# Patient Record
Sex: MALE | Race: BLACK OR AFRICAN AMERICAN | Employment: UNEMPLOYED | URBAN - METROPOLITAN AREA
[De-identification: names, ages, dates, MRNs, and addresses within clinical notes are randomized per-mention and may not be internally consistent; named-entity substitution may affect disease eponyms.]

---

## 2017-02-06 ENCOUNTER — OFFICE VISIT (OUTPATIENT)
Dept: FAMILY MEDICINE CLINIC | Age: 32
End: 2017-02-06

## 2017-02-06 VITALS
BODY MASS INDEX: 30.78 KG/M2 | RESPIRATION RATE: 16 BRPM | SYSTOLIC BLOOD PRESSURE: 150 MMHG | HEART RATE: 60 BPM | DIASTOLIC BLOOD PRESSURE: 100 MMHG | HEIGHT: 70 IN | OXYGEN SATURATION: 99 % | WEIGHT: 215 LBS | TEMPERATURE: 97.9 F

## 2017-02-06 DIAGNOSIS — Z86.69 HISTORY OF SEIZURE DISORDER: ICD-10-CM

## 2017-02-06 DIAGNOSIS — I10 ESSENTIAL HYPERTENSION: Primary | ICD-10-CM

## 2017-02-06 RX ORDER — LOSARTAN POTASSIUM 50 MG/1
TABLET ORAL
Qty: 30 TAB | Refills: 11 | Status: SHIPPED | OUTPATIENT
Start: 2017-02-06 | End: 2017-06-19 | Stop reason: ALTCHOICE

## 2017-02-06 NOTE — MR AVS SNAPSHOT
Visit Information Date & Time Provider Department Dept. Phone Encounter #  
 2/6/2017  8:20 AM Levi Chawla  [facility] [address] [phone] [id number] Follow-up Instructions Return in about 3 months (around 5/6/2017) for recheck on bp if doing well, goal is less than 140/90. Upcoming Health Maintenance Date Due DTaP/Tdap/Td series (2 - Td) 1/10/2015 Allergies as of 2/6/2017  Review Complete On: 2/6/2017 By: Levi Chawla NP Severity Noted Reaction Type Reactions Amlodipine  06/07/2016    Swelling Current Immunizations  Never Reviewed Name Date Influenza Vaccine 9/16/2014 Tdap 1/10/2005 Not reviewed this visit You Were Diagnosed With   
  
 Codes Comments Essential hypertension    -  Primary ICD-10-CM: I10 
ICD-9-CM: 401.9 History of seizure disorder     ICD-10-CM: Z86.69 
ICD-9-CM: V12.49 Vitals BP Pulse Temp Resp Height(growth percentile) Weight(growth percentile) (!) 150/100 60 97.9 °F (36.6 °C) (Oral) 16 5' 9.5\" (1.765 m) 215 lb (97.5 kg) SpO2 BMI Smoking Status 99% 31.29 kg/m2 Never Smoker Vitals History BMI and BSA Data Body Mass Index Body Surface Area  
 31.29 kg/m 2 2.19 m 2 Preferred Pharmacy Pharmacy Name Phone [id number] [phone] Your Updated Medication List  
  
   
This list is accurate as of: 2/6/17  8:43 AM.  Always use your most recent med list.  
  
  
  
  
 levETIRAcetam 1,000 mg tablet Take 1 Tab by mouth two (2) times a day. losartan 50 mg tablet Commonly known as:  COZAAR  
TAKE 1 TAB BY MOUTH DAILY. Prescriptions Sent to Pharmacy Refills  
 losartan (COZAAR) 50 mg tablet 11 Sig: TAKE 1 TAB BY MOUTH DAILY. Class: Normal  
 Pharmacy: [address] Ph #: [phone] We Performed the Following HEMOGLOBIN A1C WITH EAG [85095 CPT(R)] LIPID PANEL [83762 CPT(R)] METABOLIC PANEL, COMPREHENSIVE [29843 CPT(R)] Follow-up Instructions Return in about 3 months (around 5/6/2017) for recheck on bp if doing well, goal is less than 140/90. Introducing [facility]! Dear Ismael Lynch: Thank you for requesting a etouches account. Our records indicate that you have previously registered for a etouches account but its currently inactive. Please call our etouches support line at [phone]. Additional Information If you have questions, please visit the Frequently Asked Questions section of the etouches website at [email or web address] Remember, etouches is NOT to be used for urgent needs. For medical emergencies, dial 911. Now available from your iPhone and Android! Please provide this summary of care documentation to your next provider. Your primary care clinician is listed as Fouzia Worrell. If you have any questions after today's visit, please call [phone].

## 2017-02-06 NOTE — LETTER
2/7/2017 8:24 AM 
 
Mr. Angeline Park [address] [id number] Dear Angeline Robison: 
 
Please find your most recent results below. Resulted Orders METABOLIC PANEL, COMPREHENSIVE Result Value Ref Range Glucose 86 65 - 99 mg/dL BUN 10 6 - 20 mg/dL Creatinine 0.90 0.76 - 1.27 mg/dL GFR est non- >59 mL/min/1.73 GFR est  >59 mL/min/1.73  
 BUN/Creatinine ratio 11 8 - 19 Sodium 139 134 - 144 mmol/L Potassium 5.1 3.5 - 5.2 mmol/L Chloride 100 96 - 106 mmol/L  
 CO2 22 18 - 29 mmol/L Calcium 9.2 8.7 - 10.2 mg/dL Protein, total 7.7 6.0 - 8.5 g/dL Albumin 4.5 3.5 - 5.5 g/dL GLOBULIN, TOTAL 3.2 1.5 - 4.5 g/dL A-G Ratio 1.4 1.1 - 2.5 Bilirubin, total <0.2 0.0 - 1.2 mg/dL Alk. phosphatase 97 39 - 117 IU/L  
 AST (SGOT) 20 0 - 40 IU/L  
 ALT (SGPT) 14 0 - 44 IU/L Narrative Performed at:  [address]  [id number] : Mukul Monge MD, Phone:  [phone] LIPID PANEL Result Value Ref Range Cholesterol, total 141 100 - 199 mg/dL Triglyceride 170 (H) 0 - 149 mg/dL HDL Cholesterol 40 >39 mg/dL VLDL, calculated 34 5 - 40 mg/dL LDL, calculated 67 0 - 99 mg/dL Narrative Performed at:  [address]  [id number] : Mukul Monge MD, Phone:  [phone] HEMOGLOBIN A1C WITH EAG Result Value Ref Range Hemoglobin A1c 5.6 4.8 - 5.6 % Comment:  
            Pre-diabetes: 5.7 - 6.4 Diabetes: >6.4 Glycemic control for adults with diabetes: <7.0 Estimated average glucose 114 mg/dL Narrative Performed at:  [address]  [id number] : Mukul Monge MD, Phone:  [phone] CVD REPORT Result Value Ref Range INTERPRETATION Note Comment:  
   Supplement report is available. Narrative Performed at:  [address] [place] [address]  [id number] : Jaimie Greco PhD, Phone:  [phone] RECOMMENDATIONS: 
 
GREAT NEWS!! All of your recent lab tests are normal or at goal. I would continue everything the same and schedule your next office visit in 6 months to continue to monitor the BP and your medication that you take for the BP. Please call me if you have any questions: [phone] Sincerely, Fouzia Worrell NP

## 2017-02-06 NOTE — PROGRESS NOTES
HISTORY OF PRESENT ILLNESS  Hawa Duarte is a 33 y.o. male. HPI  Chief Complaint   Patient presents with    Blood Pressure Check     Follow up. Hawa Duarte is a 33 y.o. male with a PHMx of htn and seizure disorder. He is here for bp recheck. His  bp medication is cozaar 50mg daily which he reports he is taking but he has been out which is why he is up today. Has been out for 3 weeks. His weight is up a few lbs from his last ov. [facility] he continues to work on eating healthy and exercising. [facility] he plans on buying a bp cuff for ath ome monitoring. [facility] he last had a seizure on 9/15th and has not yet seen his neuro since but plans to make an appt soon. [facility] he has the kepra 1000mg 2 x day. He has plenty of medication left at home to hold him over until he sees neuro    Current Outpatient Prescriptions   Medication Sig Dispense Refill    losartan (COZAAR) 50 mg tablet TAKE 1 TAB BY MOUTH DAILY. 30 Tab 11    levETIRAcetam 1,000 mg tablet Take 1 Tab by mouth two (2) times a day. 60 Tab 5     Allergies   Allergen Reactions    Amlodipine Swelling     Past Medical History   Diagnosis Date    Essential hypertension 4/1/2016    Hypertension     Pneumonia Tania 10, 2014    Seizures [facility])      History reviewed. No pertinent past surgical history. Family History   Problem Relation Age of Onset    Hypertension Mother     Heart Disease Father     Hypertension Father     Seizures Daughter      Social History   Substance Use Topics    Smoking status: Never Smoker    Smokeless tobacco: Never Used    Alcohol use No       Review of Systems   Constitutional: Negative for chills, diaphoresis, fever, malaise/fatigue and weight loss. HENT: Negative for congestion, ear discharge, ear pain, hearing loss, nosebleeds, sore throat and tinnitus. Eyes: Negative for blurred vision, photophobia, pain, discharge and redness.    Respiratory: Negative for cough, hemoptysis, sputum production, shortness of breath, wheezing and stridor. Cardiovascular: Negative for chest pain, palpitations, orthopnea and leg swelling. Gastrointestinal: Negative for abdominal pain, diarrhea, nausea and vomiting. Neurological: Positive for seizures. Negative for weakness and headaches. All other systems reviewed and are negative. Physical Exam   Constitutional: He appears well-developed and well-nourished. No distress. HENT:   Head: Normocephalic. Eyes: EOM are normal. Pupils are equal, round, and reactive to light. Neck: Normal range of motion. Neck supple. Cardiovascular: Normal rate, regular rhythm, normal heart sounds and intact distal pulses. Pulmonary/Chest: Effort normal and breath sounds normal. No respiratory distress. He has no wheezes. He has no rales. He exhibits no tenderness. Skin: Skin is warm and dry. Psychiatric: He has a normal mood and affect. His behavior is normal. Judgment and thought content normal.   At baseline   Nursing note and vitals reviewed. Results for orders placed or performed in visit on 04/01/16   CREATINE KINASE (CK), MB/TOTAL   Result Value Ref Range    Creatine Kinase,Total 245 (H) 24 - 204 U/L    Creatine Kinase (CK), MB <1.0 0.0 - 33.2 ng/mL   METABOLIC PANEL, COMPREHENSIVE   Result Value Ref Range    Glucose 77 65 - 99 mg/dL    BUN 12 6 - 20 mg/dL    Creatinine 1.01 0.76 - 1.27 mg/dL    GFR est non-AA 98 >59 mL/min/1.73    GFR est  >59 mL/min/1.73    BUN/Creatinine ratio 12 8 - 19    Sodium 143 134 - 144 mmol/L    Potassium 4.6 3.5 - 5.2 mmol/L    Chloride 102 97 - 108 mmol/L    CO2 23 18 - 29 mmol/L    Calcium 9.6 8.7 - 10.2 mg/dL    Protein, total 8.0 6.0 - 8.5 g/dL    Albumin 4.8 3.5 - 5.5 g/dL    GLOBULIN, TOTAL 3.2 1.5 - 4.5 g/dL    A-G Ratio 1.5 1.1 - 2.5    Bilirubin, total 0.3 0.0 - 1.2 mg/dL    Alk.  phosphatase 97 39 - 117 IU/L    AST (SGOT) 18 0 - 40 IU/L    ALT (SGPT) 18 0 - 44 IU/L   HEMOGLOBIN A1C   Result Value Ref Range    Hemoglobin A1c 5.5 4.8 - 5.6 % ASSESSMENT and PLAN    ICD-10-CM ICD-9-CM    1. Essential hypertension I05 340.7 METABOLIC PANEL, COMPREHENSIVE      LIPID PANEL      HEMOGLOBIN A1C WITH EAG      losartan (COZAAR) 50 mg tablet   2. History of seizure disorder Z80.75 V12.49      Homar was seen today for blood pressure check. Diagnoses and all orders for this visit:    Essential hypertension  -     METABOLIC PANEL, COMPREHENSIVE  -     LIPID PANEL  -     HEMOGLOBIN A1C WITH EAG  -     Restart losartan (COZAAR) 50 mg tablet; TAKE 1 TAB BY MOUTH DAILY. Reviewed with patient and educated regarding proper use of medication, side effects, potential adverse effects and when to follow up. Cont with exercise and diet control. rx was given for at home bp monitoring cuff  Recheck in 3 months if less th an 140/90 at home. Patient verbalizes understanding of plan of care. History of seizure disorder  Reminded him to make neuro fu appt which he states he will do promptly. Follow-up Disposition:  Return in about 3 months (around 5/6/2017) for recheck on bp if doing well, goal is less than 140/90.

## 2017-02-06 NOTE — PROGRESS NOTES
Pt presents to office today for a follow up on BP. Pt states that he has been out of losartan 50 mg for about 3 weeks now. Chief Complaint   Patient presents with    Blood Pressure Check     Follow up. 1. Have you been to the ER, urgent care clinic since your last visit? Hospitalized since your last visit? No    2. Have you seen or consulted any other health care providers outside of the [address] [place] [address] since your last visit? Include any pap smears or colon screening.  No

## 2017-02-07 LAB
ALBUMIN SERPL-MCNC: 4.5 G/DL (ref 3.5–5.5)
ALBUMIN/GLOB SERPL: 1.4 {RATIO} (ref 1.1–2.5)
ALP SERPL-CCNC: 97 IU/L (ref 39–117)
ALT SERPL-CCNC: 14 IU/L (ref 0–44)
AST SERPL-CCNC: 20 IU/L (ref 0–40)
BILIRUB SERPL-MCNC: <0.2 MG/DL (ref 0–1.2)
BUN SERPL-MCNC: 10 MG/DL (ref 6–20)
BUN/CREAT SERPL: 11 (ref 8–19)
CALCIUM SERPL-MCNC: 9.2 MG/DL (ref 8.7–10.2)
CHLORIDE SERPL-SCNC: 100 MMOL/L (ref 96–106)
CHOLEST SERPL-MCNC: 141 MG/DL (ref 100–199)
CO2 SERPL-SCNC: 22 MMOL/L (ref 18–29)
CREAT SERPL-MCNC: 0.9 MG/DL (ref 0.76–1.27)
EST. AVERAGE GLUCOSE BLD GHB EST-MCNC: 114 MG/DL
GLOBULIN SER CALC-MCNC: 3.2 G/DL (ref 1.5–4.5)
GLUCOSE SERPL-MCNC: 86 MG/DL (ref 65–99)
HBA1C MFR BLD: 5.6 % (ref 4.8–5.6)
HDLC SERPL-MCNC: 40 MG/DL
INTERPRETATION, 910389: NORMAL
LDLC SERPL CALC-MCNC: 67 MG/DL (ref 0–99)
POTASSIUM SERPL-SCNC: 5.1 MMOL/L (ref 3.5–5.2)
PROT SERPL-MCNC: 7.7 G/DL (ref 6–8.5)
SODIUM SERPL-SCNC: 139 MMOL/L (ref 134–144)
TRIGL SERPL-MCNC: 170 MG/DL (ref 0–149)
VLDLC SERPL CALC-MCNC: 34 MG/DL (ref 5–40)

## 2017-02-07 NOTE — PROGRESS NOTES
GREAT NEWS!! All of your recent lab tests are normal or at goal. I would continue everything the same and schedule your next office visit in 6 months to continue to monitor the BP and your medication that you take for the BP.

## 2017-03-26 ENCOUNTER — HOSPITAL ENCOUNTER (EMERGENCY)
Age: 32
Discharge: HOME | End: 2017-03-26
Attending: EMERGENCY MEDICINE
Payer: MEDICAID

## 2017-03-26 VITALS
TEMPERATURE: 97.8 F | BODY MASS INDEX: 30.36 KG/M2 | HEART RATE: 73 BPM | DIASTOLIC BLOOD PRESSURE: 77 MMHG | HEIGHT: 69 IN | SYSTOLIC BLOOD PRESSURE: 132 MMHG | RESPIRATION RATE: 18 BRPM | OXYGEN SATURATION: 100 % | WEIGHT: 205 LBS

## 2017-03-26 DIAGNOSIS — B34.9 VIRAL ILLNESS: Primary | ICD-10-CM

## 2017-03-26 DIAGNOSIS — K52.9 GASTROENTERITIS, ACUTE: ICD-10-CM

## 2017-03-26 LAB
ALBUMIN SERPL BCP-MCNC: 4 G/DL (ref 3.4–5)
ALBUMIN/GLOB SERPL: 0.9 {RATIO} (ref 0.8–1.7)
ALP SERPL-CCNC: 85 U/L (ref 45–117)
ALT SERPL-CCNC: 33 U/L (ref 16–61)
ANION GAP BLD CALC-SCNC: 12 MMOL/L (ref 3–18)
AST SERPL W P-5'-P-CCNC: 34 U/L (ref 15–37)
BASOPHILS # BLD AUTO: 0 K/UL (ref 0–0.06)
BASOPHILS # BLD: 0 % (ref 0–2)
BILIRUB SERPL-MCNC: 0.8 MG/DL (ref 0.2–1)
BUN SERPL-MCNC: 11 MG/DL (ref 7–18)
BUN/CREAT SERPL: 10 (ref 12–20)
CALCIUM SERPL-MCNC: 8.9 MG/DL (ref 8.5–10.1)
CHLORIDE SERPL-SCNC: 102 MMOL/L (ref 100–108)
CO2 SERPL-SCNC: 27 MMOL/L (ref 21–32)
CREAT SERPL-MCNC: 1.1 MG/DL (ref 0.6–1.3)
DIFFERENTIAL METHOD BLD: ABNORMAL
EOSINOPHIL # BLD: 0.1 K/UL (ref 0–0.4)
EOSINOPHIL NFR BLD: 1 % (ref 0–5)
ERYTHROCYTE [DISTWIDTH] IN BLOOD BY AUTOMATED COUNT: 13.4 % (ref 11.6–14.5)
GLOBULIN SER CALC-MCNC: 4.4 G/DL (ref 2–4)
GLUCOSE SERPL-MCNC: 92 MG/DL (ref 74–99)
HCT VFR BLD AUTO: 41.4 % (ref 36–48)
HGB BLD-MCNC: 14.2 G/DL (ref 13–16)
LIPASE SERPL-CCNC: 58 U/L (ref 73–393)
LYMPHOCYTES # BLD AUTO: 12 % (ref 21–52)
LYMPHOCYTES # BLD: 1.5 K/UL (ref 0.9–3.6)
MCH RBC QN AUTO: 29.8 PG (ref 24–34)
MCHC RBC AUTO-ENTMCNC: 34.3 G/DL (ref 31–37)
MCV RBC AUTO: 87 FL (ref 74–97)
MONOCYTES # BLD: 1.2 K/UL (ref 0.05–1.2)
MONOCYTES NFR BLD AUTO: 9 % (ref 3–10)
NEUTS SEG # BLD: 10.1 K/UL (ref 1.8–8)
NEUTS SEG NFR BLD AUTO: 78 % (ref 40–73)
PLATELET # BLD AUTO: 276 K/UL (ref 135–420)
PMV BLD AUTO: 10.7 FL (ref 9.2–11.8)
POTASSIUM SERPL-SCNC: 4.6 MMOL/L (ref 3.5–5.5)
PROT SERPL-MCNC: 8.4 G/DL (ref 6.4–8.2)
RBC # BLD AUTO: 4.76 M/UL (ref 4.7–5.5)
SODIUM SERPL-SCNC: 141 MMOL/L (ref 136–145)
WBC # BLD AUTO: 12.9 K/UL (ref 4.6–13.2)

## 2017-03-26 PROCEDURE — 96374 THER/PROPH/DIAG INJ IV PUSH: CPT

## 2017-03-26 PROCEDURE — 99284 EMERGENCY DEPT VISIT MOD MDM: CPT

## 2017-03-26 PROCEDURE — 80053 COMPREHEN METABOLIC PANEL: CPT | Performed by: PHYSICIAN ASSISTANT

## 2017-03-26 PROCEDURE — 85025 COMPLETE CBC W/AUTO DIFF WBC: CPT | Performed by: PHYSICIAN ASSISTANT

## 2017-03-26 PROCEDURE — 83690 ASSAY OF LIPASE: CPT | Performed by: PHYSICIAN ASSISTANT

## 2017-03-26 PROCEDURE — 74011250636 HC RX REV CODE- 250/636: Performed by: PHYSICIAN ASSISTANT

## 2017-03-26 RX ORDER — ONDANSETRON 2 MG/ML
4 INJECTION INTRAMUSCULAR; INTRAVENOUS
Status: COMPLETED | OUTPATIENT
Start: 2017-03-26 | End: 2017-03-26

## 2017-03-26 RX ADMIN — ONDANSETRON 4 MG: 2 INJECTION INTRAMUSCULAR; INTRAVENOUS at 15:00

## 2017-03-26 NOTE — ED PROVIDER NOTES
HPI Comments: 2:35 PM  Radha Green is a 33 y.o. male presenting to the ED via EMS C/O gradually worsening headache onset today. Not thunderclap. Not worst HA of life. Associated sxs include back pain, fever, and several episodes of vomiting onset 2 days ago. Pt hasn't eaten today. Pt was evaluated at [facility] this AM for cough and vomiting and was dx with a virus. He was Rx Robitussin and a nausea medication that he cannot remember the name of, but he has not yet filled them because he had no transportation. PMHx includes HTN for which he takes 50 mg Losartan. Last dose was over 1 week ago because he ran out. Pt denies diarrhea, abdominal pain, urinary sx, and any other symptoms or complaints at this time. Patient is a 33 y.o. male presenting with vomiting. The history is provided by the patient. No  was used. Vomiting    This is a new problem. Patient reports a subjective fever - was not measured. Associated symptoms include a fever, headaches, cough and headaches. Pertinent negatives include no abdominal pain and no diarrhea. The patient is not pregnant. Past Medical History:   Diagnosis Date    Essential hypertension 4/1/2016    Hypertension     Pneumonia Tania 10, 2014    Seizures [facility])        History reviewed. No pertinent surgical history. Family History:   Problem Relation Age of Onset    Hypertension Mother     Heart Disease Father     Hypertension Father     Seizures Daughter        Social History     Social History    Marital status: SINGLE     Spouse name: N/A    Number of children: N/A    Years of education: N/A     Occupational History    Not on file.      Social History Main Topics    Smoking status: Never Smoker    Smokeless tobacco: Never Used    Alcohol use No    Drug use: No    Sexual activity: Yes     Partners: Female     Birth control/ protection: Pill     Other Topics Concern    Not on file     Social History Narrative         ALLERGIES: Amlodipine    Review of Systems   Constitutional: Positive for fever. Respiratory: Positive for cough. Gastrointestinal: Positive for nausea and vomiting. Negative for abdominal pain and diarrhea. Genitourinary: Negative. Musculoskeletal: Positive for back pain. Neurological: Positive for headaches. All other systems reviewed and are negative. Vitals:    03/26/17 1444 03/26/17 1711   BP: (!) 138/91 132/77   Pulse: 75 73   Resp: 18 18   Temp: 97.8 °F (36.6 °C)    SpO2: 95% 100%   Weight: 93 kg (205 lb)    Height: 5' 9\" (1.753 m)             Physical Exam   Constitutional: He is oriented to person, place, and time. He appears well-developed and well-nourished. No distress. Lying on stretcher, NAD, non toxic    HENT:   Head: Normocephalic and atraumatic. Right Ear: Tympanic membrane, external ear and ear canal normal. No mastoid tenderness. Tympanic membrane is not perforated, not erythematous, not retracted and not bulging. No hemotympanum. Left Ear: Tympanic membrane, external ear and ear canal normal. No mastoid tenderness. Tympanic membrane is not perforated, not erythematous, not retracted and not bulging. No hemotympanum. Nose: No mucosal edema or rhinorrhea. Right sinus exhibits maxillary sinus tenderness and frontal sinus tenderness. Left sinus exhibits maxillary sinus tenderness and frontal sinus tenderness. Mouth/Throat: Uvula is midline, oropharynx is clear and moist and mucous membranes are normal. Mucous membranes are not dry. No trismus in the jaw. No uvula swelling. No oropharyngeal exudate, posterior oropharyngeal edema, posterior oropharyngeal erythema or tonsillar abscesses. Eyes: EOM are normal. Pupils are equal, round, and reactive to light. Neck: Normal range of motion. Neck supple. No spinous process tenderness and no muscular tenderness present. No rigidity. Normal range of motion present. No Brudzinski's sign and no Kernig's sign noted.    No meningismus   No lymphadenopathy    Cardiovascular: Normal rate, regular rhythm, normal heart sounds and intact distal pulses. No murmur heard. Pulmonary/Chest: Effort normal and breath sounds normal. No respiratory distress. He has no wheezes. He has no rales. No wheezing, good air movement    Abdominal: Soft. Bowel sounds are normal. He exhibits no distension. There is no tenderness. There is no rebound and no guarding. abd soft non tender    Musculoskeletal: Normal range of motion. Diffuse tenderness with light palpation to entire back      Lymphadenopathy:     He has no cervical adenopathy. Neurological: He is alert and oriented to person, place, and time. He has normal strength. He displays no atrophy and no tremor. No cranial nerve deficit or sensory deficit. He exhibits normal muscle tone. Coordination and gait normal. GCS eye subscore is 4. GCS verbal subscore is 5. GCS motor subscore is 6. No neuro deficit   N/V intact distally   Strength 5/5 and equal in all extremities   No slurred speech   No facial droop    Skin: Skin is warm and dry. No rash noted. No erythema. No pallor. Psychiatric: He has a normal mood and affect. Judgment normal.   Nursing note and vitals reviewed. RESULTS:    No orders to display        Labs Reviewed   CBC WITH AUTOMATED DIFF - Abnormal; Notable for the following:        Result Value    NEUTROPHILS 78 (*)     LYMPHOCYTES 12 (*)     ABS. NEUTROPHILS 10.1 (*)     All other components within normal limits   METABOLIC PANEL, COMPREHENSIVE - Abnormal; Notable for the following:     BUN/Creatinine ratio 10 (*)     Protein, total 8.4 (*)     Globulin 4.4 (*)     All other components within normal limits   LIPASE - Abnormal; Notable for the following:     Lipase 58 (*)     All other components within normal limits       No results found for this or any previous visit (from the past 12 hour(s)).     MDM  Number of Diagnoses or Management Options  Gastroenteritis, acute:   Viral illness:   Diagnosis management comments: Gastroenteritis, URI, sinusitis, medication non compliance        Amount and/or Complexity of Data Reviewed  Clinical lab tests: ordered and reviewed      ED Course     MEDICATIONS GIVEN:  Medications   ondansetron (ZOFRAN) injection 4 mg (4 mg IntraVENous Given 3/26/17 1500)       Procedures    PROGRESS NOTE:  2:34 PM  Initial assessment performed. Recorded by Surjit Caceres, ED Scribe, as dictated by Sonya De La Rosa PA-C.    4:05 PM   Pt sleeping comfortably. States he feels much better. Informed of results. Recommended he get his prescriptions filled and take them as previously advised by [facility]. Discharge Note:  4:06 PM   Homar Thorne's results have been reviewed with him and/or his family. He has been counseled regarding his diagnosis, treatment, and plan. He verbally conveys understanding and agreement of the signs, symptoms, diagnosis, treatment and prognosis and additionally agrees to follow up as discussed. He also agrees with the care-plan and conveys that all of his questions have been answered. I have also provided discharge instructions for him that include: educational information regarding the diagnosis and treatment, and a list of reasons why He would want to return to the ED prior to his follow-up appointment, should his condition change. Proper ED utilization discussed with the patient. CLINICAL IMPRESSION    1. Viral illness    2.  Gastroenteritis, acute        After visit plan:  D/c home    Discharge Medication List as of 3/26/2017  4:07 PM          Follow-up Information     Follow up With Details Comments [address] NP Call in 2 days For follow up with your PCP [address]   [address] [facility], [address] [place] [address] [address] [place] [address] [address]    [facility] EMERGENCY DE[facility]  As needed, If symptoms worsen [address] [id number] [phone] This note is prepared by Rich Agarwal, acting as Scribe for Sammy Castro PA-C. Sammy Castro PA-C: The scribe's documentation has been prepared under my direction and personally reviewed by me in its entirety. I confirm that the note above accurately reflects all work, treatment, procedures, and medical decision making performed by me.

## 2017-03-26 NOTE — DISCHARGE INSTRUCTIONS
Gastroenteritis: Care Instructions  Your Care Instructions  Gastroenteritis is an illness that may cause nausea, vomiting, and diarrhea. It is sometimes called \"stomach flu. \" It can be caused by bacteria or a virus. You will probably begin to feel better in 1 to 2 days. In the meantime, get plenty of rest and make sure you do not become dehydrated. Dehydration occurs when your body loses too much fluid. Follow-up care is a key part of your treatment and safety. Be sure to make and go to all appointments, and call your doctor if you are having problems. Its also a good idea to know your test results and keep a list of the medicines you take. How can you care for yourself at home? · If your doctor prescribed antibiotics, take them as directed. Do not stop taking them just because you feel better. You need to take the full course of antibiotics. · Drink plenty of fluids to prevent dehydration, enough so that your urine is light yellow or clear like water. Choose water and other caffeine-free clear liquids until you feel better. If you have kidney, heart, or liver disease and have to limit fluids, talk with your doctor before you increase your fluid intake. · Drink fluids slowly, in frequent, small amounts, because drinking too much too fast can cause vomiting. · Begin eating mild foods, such as dry toast, yogurt, applesauce, bananas, and rice. Avoid spicy, hot, or high-fat foods, and do not drink alcohol or caffeine for a day or two. Do not drink milk or eat ice cream until you are feeling better. How to prevent gastroenteritis  · Keep hot foods hot and cold foods cold. · Do not eat meats, dressings, salads, or other foods that have been kept at room temperature for more than 2 hours. · Use a thermometer to check your refrigerator. It should be between 34°F and 40°F.  · Defrost meats in the refrigerator or microwave, not on the kitchen counter. · Keep your hands and your kitchen clean.  Wash your hands, cutting boards, and countertops with hot soapy water frequently. · Cook meat until it is well done. · Do not eat raw eggs or uncooked sauces made with raw eggs. · Do not take chances. If food looks or tastes spoiled, throw it out. When should you call for help? Call 911 anytime you think you may need emergency care. For example, call if:  · You vomit blood or what looks like coffee grounds. · You passed out (lost consciousness). · You pass maroon or very bloody stools. Call your doctor now or seek immediate medical care if:  · You have severe belly pain. · You have signs of needing more fluids. You have sunken eyes, a dry mouth, and pass only a little dark urine. · You feel like you are going to faint. · You have increased belly pain that does not go away in 1 to 2 days. · You have new or increased nausea, or you are vomiting. · You have a new or higher fever. · Your stools are black and tarlike or have streaks of blood. Watch closely for changes in your health, and be sure to contact your doctor if:  · You are dizzy or lightheaded. · You urinate less than usual, or your urine is dark yellow or brown. · You do not feel better with each day that goes by. Where can you learn more? Go to http://samy-georges.info/. Enter N142 in the search box to learn more about \"Gastroenteritis: Care Instructions. \"  Current as of: May 24, 2016  Content Version: 11.1  © 9019-8080 [employer]. Care instructions adapted under license by [employer] (which disclaims liability or warranty for this information). If you have questions about a medical condition or this instruction, always ask your healthcare professional. [address] any warranty or liability for your use of this information. Viral Infections: Care Instructions  Your Care Instructions  You don't feel well, but it's not clear what's causing it. You may have a viral infection.  Viruses cause many illnesses, such as the common cold, influenza, fever, rashes, and the diarrhea, nausea, and vomiting that are often called \"stomach flu. \" You may wonder if antibiotic medicines could make you feel better. But antibiotics only treat infections caused by bacteria. They don't work on viruses. The good news is that viral infections usually aren't serious. Most will go away in a few days without medical treatment. In the meantime, there are a few things you can do to make yourself more comfortable. Follow-up care is a key part of your treatment and safety. Be sure to make and go to all appointments, and call your doctor if you are having problems. It's also a good idea to know your test results and keep a list of the medicines you take. How can you care for yourself at home? · Get plenty of rest if you feel tired. · Take an over-the-counter pain medicine if needed, such as acetaminophen (Tylenol), ibuprofen (Advil, Motrin), or naproxen (Aleve). Read and follow all instructions on the label. · Be careful when taking over-the-counter cold or flu medicines and Tylenol at the same time. Many of these medicines have acetaminophen, which is Tylenol. Read the labels to make sure that you are not taking more than the recommended dose. Too much acetaminophen (Tylenol) can be harmful. · Drink plenty of fluids, enough so that your urine is light yellow or clear like water. If you have kidney, heart, or liver disease and have to limit fluids, talk with your doctor before you increase the amount of fluids you drink. · Stay home from work, school, and other public places while you have a fever. When should you call for help? Call 911 anytime you think you may need emergency care. For example, call if:  · You have severe trouble breathing. · You passed out (lost consciousness). Call your doctor now or seek immediate medical care if:  · You seem to be getting much sicker. · You have a new or higher fever.   · You have blood in your stools. · You have new belly pain, or your pain gets worse. · You have a new rash. Watch closely for changes in your health, and be sure to contact your doctor if:  · You start to get better and then get worse. · You do not get better as expected. Where can you learn more? Go to http://samy-georges.info/. Enter V416 in the search box to learn more about \"Viral Infections: Care Instructions. \"  Current as of: May 24, 2016  Content Version: 11.1  © 8411-6229 [employer]. Care instructions adapted under license by [employer] (which disclaims liability or warranty for this information). If you have questions about a medical condition or this instruction, always ask your healthcare professional. [address] any warranty or liability for your use of this information.

## 2017-03-26 NOTE — ED TRIAGE NOTES
Pt reports being sick x 4 days, seen at [facility] yesterday and diagnosed w/ virus. Pt reports he has been vomiting multiple times today. Pt w/ hx of HTN and has not taken meds in one week. Pt reporting 10/10 to head and back (generalized). Per EMS pt stated that the IV placed helped his pain and on arrival to ED pt states, \"Can't ya'll just put something in this IV to stop the pain? \" Pt alert and oriented, VSS, on arrival.    Sepsis Screening completed    (  )Patient meets SIRS criteria. ( X )Patient does not meet SIRS criteria.       SIRS Criteria is achieved when two or more of the following are present   Temperature < 96.8°F (36°C) or > 100.9°F (38.3°C)   Heart Rate > 90 beats per minute   Respiratory Rate > 20 breaths per minute   WBC count > 12,000 or <4,000 or > 10% bands

## 2017-03-27 ENCOUNTER — PATIENT OUTREACH (OUTPATIENT)
Dept: FAMILY MEDICINE CLINIC | Age: 32
End: 2017-03-27

## 2017-03-27 NOTE — PROGRESS NOTES
Per [facility], [facility] report, patient went to [facility] ED 3/26 for c/o vomiting. Dx- viral illness and acute gastroenteritis. Given Zofran IV in hospital.   NN called patient today, unable to leave him a message. Will continue to try and reach- will also send letter.

## 2017-03-27 NOTE — LETTER
3/27/2017 11:44 AM 
 
Mr. Guerita Park [phone] Dear Mr. Kirk Mark, 
 
I am a Nurse Navigator working with your physician's office. Part of my job is to follow up with patients who have been in the emergency department or hospital to see how they are feeling, answer any questions they may have about their visit and also make sure they have a follow-up appointment to see their primary care doctor. I saw that you had gone to [facility] ED. I can also provide resources to patients that have other needs. Please call me if you need assistance with affording food, medications, or if you need transportation to physician appointments. Also, I have enclosed information about \"My Chart,\" which allows you to access your medical record from home. If you would be interested in this feature I can activate it for you. I can be reached Monday thru Friday at the telephone number listed above. Thank you for allowing us to participate in your care!  
 
 
 
Sincerely, 
 
 
Manju Damon RN

## 2017-03-27 NOTE — LETTER
March 27, 2017 Marcy Park [phone] Dear Evette Perez: Thank you for requesting access to [employer]. Please follow the instructions below to securely access and download your online medical record. [employer] allows you to send messages to your doctor, view your test results, renew your prescriptions, schedule appointments, and more. How Do I Sign Up? 1. In your internet browser, go to [email or web address] 2. Click on the First Time User? Click Here link in the Sign In box. You will see the New Member Sign Up page. 3. Enter your [employer] Access Code exactly as it appears below. You will not need to use this code after youve completed the sign-up process. If you do not sign up before the expiration date, you must request a new code. [employer] Access Code: [email or web address] Expires: 6/24/2017  2:55 PM  
 
4. Enter the last four digits of your Social Security Number (xxxx) and Date of Birth (mm/dd/yyyy) as indicated and click Submit. You will be taken to the next sign-up page. 5. Create a [employer] ID. This will be your [employer] login ID and cannot be changed, so think of one that is secure and easy to remember. 6. Create a [employer] password. You can change your password at any time. 7. Enter your Password Reset Question and Answer. This can be used at a later time if you forget your password. 8. Enter your e-mail address. You will receive e-mail notification when new information is available in [address]. 9. Click Sign Up. You can now view and download portions of your medical record. 10. Click the Download Summary menu link to download a portable copy of your medical information. Additional Information If you have questions, please visit the Frequently Asked Questions section of the [employer] website at [email or web address] Remember, [employer] is NOT to be used for urgent needs. For medical emergencies, dial 911. Now available from your iPhone and Android! Sincerely, [employer]

## 2017-04-12 ENCOUNTER — OFFICE VISIT (OUTPATIENT)
Dept: FAMILY MEDICINE CLINIC | Age: 32
End: 2017-04-12

## 2017-04-12 VITALS
BODY MASS INDEX: 32.29 KG/M2 | RESPIRATION RATE: 18 BRPM | DIASTOLIC BLOOD PRESSURE: 92 MMHG | OXYGEN SATURATION: 99 % | SYSTOLIC BLOOD PRESSURE: 138 MMHG | TEMPERATURE: 96.8 F | HEART RATE: 58 BPM | WEIGHT: 218 LBS | HEIGHT: 69 IN

## 2017-04-12 DIAGNOSIS — J40 BRONCHITIS: ICD-10-CM

## 2017-04-12 DIAGNOSIS — G40.909 SEIZURE DISORDER (HCC): ICD-10-CM

## 2017-04-12 DIAGNOSIS — I10 ESSENTIAL HYPERTENSION: Primary | ICD-10-CM

## 2017-04-12 RX ORDER — CODEINE PHOSPHATE AND GUAIFENESIN 10; 100 MG/5ML; MG/5ML
SOLUTION ORAL
Refills: 0 | COMMUNITY
Start: 2017-04-07 | End: 2017-05-17

## 2017-04-12 NOTE — MR AVS SNAPSHOT
Visit Information Date & Time Provider Department Dept. Phone Encounter #  
 4/12/2017  9:30 AM [facility]evon [facility]el [facility] [phone] [id number] Follow-up Instructions Return in about 4 months (around 8/12/2017) for blood pressure. Upcoming Health Maintenance Date Due DTaP/Tdap/Td series (2 - Td) 1/10/2015 Allergies as of 4/12/2017  Review Complete On: 4/12/2017 By: Kevon Nagel NP Severity Noted Reaction Type Reactions Amlodipine  06/07/2016    Swelling Current Immunizations  Never Reviewed Name Date Influenza Vaccine 9/16/2014 Tdap 1/10/2005 Not reviewed this visit You Were Diagnosed With   
  
 Codes Comments Essential hypertension    -  Primary ICD-10-CM: I10 
ICD-9-CM: 401.9 Bronchitis     ICD-10-CM: J40 ICD-9-CM: 123 Seizure disorder ([facility] [address]     ICD-10-CM: G40.909 ICD-9-CM: 345.90 Vitals BP Pulse Temp Resp Height(growth percentile) Weight(growth percentile) (!) 138/92 (BP 1 Location: Left arm, BP Patient Position: Sitting) (!) 58 96.8 °F (36 °C) (Oral) 18 5' 9\" (1.753 m) 218 lb (98.9 kg) SpO2 BMI Smoking Status 99% 32.19 kg/m2 Never Smoker Vitals History BMI and BSA Data Body Mass Index Body Surface Area  
 32.19 kg/m 2 2.19 m 2 Preferred Pharmacy Pharmacy Name Phone [id number] [phone] Your Updated Medication List  
  
   
This list is accurate as of: 4/12/17  9:34 AM.  Always use your most recent med list.  
  
  
  
  
 guaiFENesin-codeine 100-10 mg/5 mL solution Commonly known as:  ROBITUSSIN AC  
TAKE 10ML BY MOUTH 4 TIMES A DAY AS NEEDED  
  
 levETIRAcetam 1,000 mg tablet Take 1 Tab by mouth two (2) times a day. losartan 50 mg tablet Commonly known as:  COZAAR  
TAKE 1 TAB BY MOUTH DAILY. Follow-up Instructions Return in about 4 months (around 8/12/2017) for blood pressure. Patient Instructions Bronchitis: Care Instructions Your Care Instructions Bronchitis is inflammation of the bronchial tubes, which carry air to the lungs. The tubes swell and produce mucus, or phlegm. The mucus and inflamed bronchial tubes make you cough. You may have trouble breathing. Most cases of bronchitis are caused by viruses like those that cause colds. Antibiotics usually do not help and they may be harmful. Bronchitis usually develops rapidly and lasts about 2 to 3 weeks in otherwise healthy people. Follow-up care is a key part of your treatment and safety. Be sure to make and go to all appointments, and call your doctor if you are having problems. It's also a good idea to know your test results and keep a list of the medicines you take. How can you care for yourself at home? · Take all medicines exactly as prescribed. Call your doctor if you think you are having a problem with your medicine. · Get some extra rest. 
· Take an over-the-counter pain medicine, such as acetaminophen (Tylenol), ibuprofen (Advil, Motrin), or naproxen (Aleve) to reduce fever and relieve body aches. Read and follow all instructions on the label. · Do not take two or more pain medicines at the same time unless the doctor told you to. Many pain medicines have acetaminophen, which is Tylenol. Too much acetaminophen (Tylenol) can be harmful. · Take an over-the-counter cough medicine that contains dextromethorphan to help quiet a dry, hacking cough so that you can sleep. Avoid cough medicines that have more than one active ingredient. Read and follow all instructions on the label. · Breathe moist air from a humidifier, hot shower, or sink filled with hot water. The heat and moisture will thin mucus so you can cough it out. · Do not smoke. Smoking can make bronchitis worse.  If you need help quitting, talk to your doctor about stop-smoking programs and medicines. These can increase your chances of quitting for good. When should you call for help? Call 911 anytime you think you may need emergency care. For example, call if: 
· You have severe trouble breathing. Call your doctor now or seek immediate medical care if: 
· You have new or worse trouble breathing. · You cough up dark brown or bloody mucus (sputum). · You have a new or higher fever. · You have a new rash. Watch closely for changes in your health, and be sure to contact your doctor if: 
· You cough more deeply or more often, especially if you notice more mucus or a change in the color of your mucus. · You are not getting better as expected. Where can you learn more? Go to http://samy-georges.info/. Enter H333 in the search box to learn more about \"Bronchitis: Care Instructions. \" Current as of: May 23, 2016 Content Version: 11.2 © 0346-7748 [employer]. Care instructions adapted under license by [employer] (which disclaims liability or warranty for this information). If you have questions about a medical condition or this instruction, always ask your healthcare professional. [place] [address] any warranty or liability for your use of this information. Introducing [facility]! Vishal Crews introduces [employer] patient portal. Now you can access parts of your medical record, email your doctor's office, and request medication refills online. 1. In your internet browser, go to [email or web address] 2. Click on the First Time User? Click Here link in the Sign In box. You will see the New Member Sign Up page. 3. Enter your [employer] Access Code exactly as it appears below. You will not need to use this code after youve completed the sign-up process. If you do not sign up before the expiration date, you must request a new code. · [facility] Access Code: [email or web address] Expires: 6/24/2017  2:55 PM 
 
4. Enter the last four digits of your Social Security Number (xxxx) and Date of Birth (mm/dd/yyyy) as indicated and click Submit. You will be taken to the next sign-up page. 5. Create a [employer] ID. This will be your [employer] login ID and cannot be changed, so think of one that is secure and easy to remember. 6. Create a [employer] password. You can change your password at any time. 7. Enter your Password Reset Question and Answer. This can be used at a later time if you forget your password. 8. Enter your e-mail address. You will receive e-mail notification when new information is available in [address] 9. Click Sign Up. You can now view and download portions of your medical record. 10. Click the Download Summary menu link to download a portable copy of your medical information. If you have questions, please visit the Frequently Asked Questions section of the [employer] website. Remember, [employer] is NOT to be used for urgent needs. For medical emergencies, dial 911. Now available from your iPhone and Android! Please provide this summary of care documentation to your next provider. Your primary care clinician is listed as Liz Erickson. If you have any questions after today's visit, please call [phone].

## 2017-04-12 NOTE — PROGRESS NOTES
Vivienne Acevedo is a 33 y.o. male who was seen in clinic today (4/12/2017). Subjective:  Cardiovascular Review:  The patient has hypertension and obesity. Diet and Lifestyle: generally follows a low fat low cholesterol diet, generally follows a low sodium diet, exercises regularly, nonsmoker  Home BP Monitoring: is borderline controlled at home, ranging 140's/100's. Pertinent ROS: not taking medications regularly as instructed, no TIA's, no chest pain on exertion, no dyspnea on exertion, no swelling of ankles. Acute Bronchitis  Patient presents for follow up of productive cough. Symptoms began 3 weeks ago and are gradually improving since that time. Patient [facility] at [facility] in [place]. Treated with Robitussin AC. Past history is significant for occasional episodes of bronchitis. Patient denies smoking. Patient has a h/o seizure disorder. Last seizure 10/2016. Seen by neurology, Dr. Phyllis Corona routinely. Taking Keppra BID. Prior to Admission medications    Medication Sig Start Date End Date Taking? Authorizing Provider   guaiFENesin-codeine (ROBITUSSIN AC) 100-10 mg/5 mL solution TAKE 10ML BY MOUTH 4 TIMES A DAY AS NEEDED 4/7/17  Yes Historical Provider   losartan (COZAAR) 50 mg tablet TAKE 1 TAB BY MOUTH DAILY. 2/6/17  Yes Bridgette Kincaid NP   levETIRAcetam 1,000 mg tablet Take 1 Tab by mouth two (2) times a day. 9/14/16  Yes Rosibel Mack MD          Allergies   Allergen Reactions    Amlodipine Swelling           ROS  See HPI    Objective:   Physical Exam   Constitutional: He is oriented to person, place, and time. He appears well-developed and well-nourished. Neck: Normal range of motion. Neck supple. No JVD present. Carotid bruit is not present. No thyromegaly present. Cardiovascular: Normal rate, regular rhythm and intact distal pulses. Exam reveals no gallop and no friction rub. No murmur heard.   Pulmonary/Chest: Effort normal and breath sounds normal. No respiratory distress. Lymphadenopathy:     He has no cervical adenopathy. Neurological: He is alert and oriented to person, place, and time. Psychiatric: He has a normal mood and affect. His behavior is normal.   Nursing note and vitals reviewed. Visit Vitals    BP (!) 138/92 (BP 1 Location: Left arm, BP Patient Position: Sitting)    Pulse (!) 58    Temp 96.8 °F (36 °C) (Oral)    Resp 18    Ht 5' 9\" (1.753 m)    Wt 218 lb (98.9 kg)    SpO2 99%    BMI 32.19 kg/m2       Assessment & Plan:  Marguerite Perla was seen today for hypertension and cough. Diagnoses and all orders for this visit:    Essential hypertension  Borderline control. Discussed importance of medication compliance. Bronchitis  Resolving. Increase fluids and rest. Reviewed OTC products that patient could take. Seizure disorder ([facility] [address]  Stable, no changes to current therapy      I have discussed the diagnosis with the patient and the intended plan as seen in the above orders. The patient has received an after-visit summary along with patient information handout. I have discussed medication side effects and warnings with the patient as well. Follow-up Disposition:  Return in about 4 months (around 8/12/2017) for blood pressure.         Luis Thornton NP

## 2017-04-12 NOTE — PROGRESS NOTES
Chief Complaint   Patient presents with    Hypertension     states b/p has been elevated lately, was out of losartan for about 9 days    Cough     had bad cough with some blood but went to [facility], given Miguel. with codeine ,states cough is much better       Reviewed Record in preparation for visit and have obtained necessary documentation. Identified pt with two pt identifiers (Name @ )    Health Maintenance Due   Topic    DTaP/Tdap/Td series (2 - Td)         1. Have you been to the ER, urgent care clinic since your last visit? Hospitalized since your last visit? See todays encounter    2. Have you seen or consulted any other health care providers outside of the [address] [place] [address] since your last visit? Include any pap smears or colon screening.  no

## 2017-04-12 NOTE — PATIENT INSTRUCTIONS
Bronchitis: Care Instructions  Your Care Instructions    Bronchitis is inflammation of the bronchial tubes, which carry air to the lungs. The tubes swell and produce mucus, or phlegm. The mucus and inflamed bronchial tubes make you cough. You may have trouble breathing. Most cases of bronchitis are caused by viruses like those that cause colds. Antibiotics usually do not help and they may be harmful. Bronchitis usually develops rapidly and lasts about 2 to 3 weeks in otherwise healthy people. Follow-up care is a key part of your treatment and safety. Be sure to make and go to all appointments, and call your doctor if you are having problems. It's also a good idea to know your test results and keep a list of the medicines you take. How can you care for yourself at home? · Take all medicines exactly as prescribed. Call your doctor if you think you are having a problem with your medicine. · Get some extra rest.  · Take an over-the-counter pain medicine, such as acetaminophen (Tylenol), ibuprofen (Advil, Motrin), or naproxen (Aleve) to reduce fever and relieve body aches. Read and follow all instructions on the label. · Do not take two or more pain medicines at the same time unless the doctor told you to. Many pain medicines have acetaminophen, which is Tylenol. Too much acetaminophen (Tylenol) can be harmful. · Take an over-the-counter cough medicine that contains dextromethorphan to help quiet a dry, hacking cough so that you can sleep. Avoid cough medicines that have more than one active ingredient. Read and follow all instructions on the label. · Breathe moist air from a humidifier, hot shower, or sink filled with hot water. The heat and moisture will thin mucus so you can cough it out. · Do not smoke. Smoking can make bronchitis worse. If you need help quitting, talk to your doctor about stop-smoking programs and medicines. These can increase your chances of quitting for good.   When should you call for help? Call 911 anytime you think you may need emergency care. For example, call if:  · You have severe trouble breathing. Call your doctor now or seek immediate medical care if:  · You have new or worse trouble breathing. · You cough up dark brown or bloody mucus (sputum). · You have a new or higher fever. · You have a new rash. Watch closely for changes in your health, and be sure to contact your doctor if:  · You cough more deeply or more often, especially if you notice more mucus or a change in the color of your mucus. · You are not getting better as expected. Where can you learn more? Go to http://samy-georges.info/. Enter H333 in the search box to learn more about \"Bronchitis: Care Instructions. \"  Current as of: May 23, 2016  Content Version: 11.2  © 9720-6324 [employer]. Care instructions adapted under license by [employer] (which disclaims liability or warranty for this information). If you have questions about a medical condition or this instruction, always ask your healthcare professional. [address] any warranty or liability for your use of this information.

## 2017-05-03 ENCOUNTER — HOSPITAL ENCOUNTER (EMERGENCY)
Age: 32
Discharge: HOME | End: 2017-05-03
Attending: EMERGENCY MEDICINE
Payer: MEDICAID

## 2017-05-03 VITALS
HEIGHT: 70 IN | SYSTOLIC BLOOD PRESSURE: 114 MMHG | BODY MASS INDEX: 31.5 KG/M2 | DIASTOLIC BLOOD PRESSURE: 93 MMHG | WEIGHT: 220 LBS | TEMPERATURE: 98 F | HEART RATE: 85 BPM | RESPIRATION RATE: 20 BRPM | OXYGEN SATURATION: 100 %

## 2017-05-03 DIAGNOSIS — S00.512A ABRASION OF TONGUE, INITIAL ENCOUNTER: ICD-10-CM

## 2017-05-03 DIAGNOSIS — R56.9 SEIZURE (HCC): Primary | ICD-10-CM

## 2017-05-03 PROCEDURE — 36415 COLL VENOUS BLD VENIPUNCTURE: CPT | Performed by: EMERGENCY MEDICINE

## 2017-05-03 PROCEDURE — 80177 DRUG SCRN QUAN LEVETIRACETAM: CPT | Performed by: EMERGENCY MEDICINE

## 2017-05-03 PROCEDURE — 74011250636 HC RX REV CODE- 250/636: Performed by: EMERGENCY MEDICINE

## 2017-05-03 PROCEDURE — 96374 THER/PROPH/DIAG INJ IV PUSH: CPT

## 2017-05-03 PROCEDURE — 96375 TX/PRO/DX INJ NEW DRUG ADDON: CPT

## 2017-05-03 PROCEDURE — 99285 EMERGENCY DEPT VISIT HI MDM: CPT

## 2017-05-03 RX ORDER — ONDANSETRON 2 MG/ML
4 INJECTION INTRAMUSCULAR; INTRAVENOUS
Status: COMPLETED | OUTPATIENT
Start: 2017-05-03 | End: 2017-05-03

## 2017-05-03 RX ORDER — LORAZEPAM 2 MG/ML
1 INJECTION INTRAMUSCULAR ONCE
Status: COMPLETED | OUTPATIENT
Start: 2017-05-03 | End: 2017-05-03

## 2017-05-03 RX ADMIN — ONDANSETRON 4 MG: 2 INJECTION INTRAMUSCULAR; INTRAVENOUS at 03:29

## 2017-05-03 RX ADMIN — LORAZEPAM 1 MG: 2 INJECTION INTRAMUSCULAR; INTRAVENOUS at 03:29

## 2017-05-03 NOTE — ED PROVIDER NOTES
HPI Comments: 33 y.o. male with past medical history significant for Seizures, HTN, Pneumonia who presents from home with chief complaint of seizure. Pt EMS, pt c/o seizure shortly pta of which lasted for \"30 seconds\". Seizure was not witnessed. Pt states last seizure was in November. He reports taking his Keppra regularly. Pt also c/o nausea, abdominal pain and back pain of unknown chronicity. Pt denies chest pain, headache, fever, cough, congestion, vomiting, diarrhea, constipation or change in urination. There are no other acute medical concerns at this time. Old chart review: Pt evaluated in ED 3/26/17 for viral illness and gastroenteritis. Pt discharged home to take regular medications as prescribed and to f/u with PCP or ED for worsening symptoms. PCP: Devan Díaz NP  Neurology: Anjali Braxton MD    Note written by Katia Gifford, as dictated by Marco Escoto MD 3:49 AM    The history is provided by the patient. No  was used. Past Medical History:   Diagnosis Date    Essential hypertension 4/1/2016    Hypertension     Pneumonia Tania 10, 2014    Seizures [facility])        No past surgical history on file. Family History:   Problem Relation Age of Onset    Hypertension Mother     Heart Disease Father     Hypertension Father     Seizures Daughter        Social History     Social History    Marital status: SINGLE     Spouse name: N/A    Number of children: N/A    Years of education: N/A     Occupational History    Not on file. Social History Main Topics    Smoking status: Never Smoker    Smokeless tobacco: Never Used    Alcohol use No    Drug use: No    Sexual activity: Yes     Partners: Female     Birth control/ protection: Pill     Other Topics Concern    Not on file     Social History Narrative         ALLERGIES: Amlodipine    Review of Systems   Constitutional: Negative for fever. HENT: Negative for congestion and sore throat. Eyes: Negative for photophobia. Respiratory: Negative for cough and shortness of breath. Cardiovascular: Negative for chest pain and leg swelling. Gastrointestinal: Positive for abdominal pain and nausea. Negative for constipation, diarrhea and vomiting. Genitourinary: Negative for difficulty urinating, dysuria and hematuria. Musculoskeletal: Positive for back pain. Negative for neck pain. Skin: Negative for rash. Neurological: Positive for seizures. Negative for dizziness, weakness, numbness and headaches. All other systems reviewed and are negative. Vitals:    05/03/17 0324   Pulse: (!) 110   Resp: 22   SpO2: 100%   Height: 5' 9.5\" (1.765 m)            Physical Exam   Nursing note and vitals reviewed. CONSTITUTIONAL: Well-appearing; well-nourished; in no apparent distress  HEAD: Normocephalic; abrasions lower lip and tongue  EYES: PERRL; EOM intact; conjunctiva and sclera are clear bilaterally. ENT: No rhinorrhea; normal pharynx with no tonsillar hypertrophy; mucous membranes pink/moist, no erythema, no exudate. NECK: Supple; non-tender; no cervical lymphadenopathy  CARD: Normal S1, S2; no murmurs, rubs, or gallops. Regular rate and rhythm. RESP: Normal respiratory effort; breath sounds clear and equal bilaterally; no wheezes, rhonchi, or rales. ABD: Normal bowel sounds; non-distended; non-tender; no palpable organomegaly, no masses, no bruits. Back Exam: Normal inspection; no vertebral point tenderness, no CVA tenderness. Normal range of motion. EXT: Normal ROM in all four extremities; non-tender to palpation; no swelling or deformity; distal pulses are normal, no edema. SKIN: Warm; dry; no rash.   NEURO:Alert and oriented x 3, coherent, LEWIS-XII grossly intact, sensory and motor are non-focal.      MDM  Number of Diagnoses or Management Options  Abrasion of tongue, initial encounter:   Seizure [facility]):   Diagnosis management comments: Assessment: 1year-old male with history of seizure disorder, who presents to the ED after having a generalized tonic-clonic seizure. The patient is postictal, but follows commands appropriately. Plan: education and reassurance/ lab/ observation with serial reassessment/ Monitor and Reevaluate. Amount and/or Complexity of Data Reviewed  Clinical lab tests: ordered and reviewed  Tests in the radiology section of CPT®: ordered and reviewed  Tests in the medicine section of CPT®: ordered and reviewed  Discussion of test results with the performing providers: yes  Decide to obtain previous medical records or to obtain history from someone other than the patient: yes  Obtain history from someone other than the patient: yes  Review and summarize past medical records: yes  Discuss the patient with other providers: yes  Independent visualization of images, tracings, or specimens: yes    Risk of Complications, Morbidity, and/or Mortality  Presenting problems: moderate  Diagnostic procedures: moderate  Management options: moderate      ED Course       Procedures     Progress Note:   Pt has been reexamined by Swathi Fofana MD. Pt is feeling much better. Symptoms have improved. All available results have been reviewed with pt and any available family. Pt understands sx, dx, and tx in ED. Care plan has been outlined and questions have been answered. Pt is ready to go home. Will send home on seizure disorder/ abrasion of the tongue instruction. Outpatient referral with PCP/ neurology as needed. Written by Swathi Fofana MD,10:57 PM    .   .

## 2017-05-03 NOTE — ED TRIAGE NOTES
Pt arrives to ED by EMS alert and oriented x 3. Pt had reported seizure for approx 30 seconds (unwitnessed). BG in route 112. Pt received 4 mg Zofran in route. No seizure noted in route. VS stable.

## 2017-05-03 NOTE — ED NOTES
Pt resting comfortably with eyes closed @ this time. Respirations even and unlabored. Easily awakens to voice and light touch. No distress noted. Call light in reach. Will continue to monitor. Pt is alert and oriented x 4.

## 2017-05-03 NOTE — DISCHARGE INSTRUCTIONS
We hope that we have addressed all of your medical concerns. The examination and treatment you received in the Emergency Department were for an emergent problem and were not intended as complete care. It is important that you follow up with your healthcare provider(s) for ongoing care. If your symptoms worsen or do not improve as expected, and you are unable to reach your usual health care provider(s), you should return to the Emergency Department. Today's healthcare is undergoing tremendous change, and patient satisfaction surveys are one of the many tools to assess the quality of medical care. You may receive a survey from the [employer] regarding your experience in the Emergency Department. I hope that your experience has been completely positive, particularly the medical care that I provided. As such, please participate in the survey; anything less than excellent does not meet my expectations or intentions. [facility] and [employer] participate in nationally recognized quality of care measures. If your blood pressure is greater than 120/80, as reported below, we urge that you seek medical care to address the potential of high blood pressure, commonly known as hypertension. Hypertension can be hereditary or can be caused by certain medical conditions, pain, stress, or \"white coat syndrome. \"       Please make an appointment with your health care provider(s) for follow up of your Emergency Department visit. VITALS:   Patient Vitals for the past 8 hrs:   Temp Pulse Resp BP SpO2   05/03/17 0446 98.5 °F (36.9 °C) 91 20 140/71 99 %   05/03/17 0405 - - - - 99 %   05/03/17 0340 - - - - 99 %   05/03/17 0324 - (!) 110 22 - 100 %          Thank you for allowing us to provide you with medical care today. We realize that you have many choices for your emergency care needs.   Please choose us in the future for any continued health care needs.      MD MIESHA Serra [address]: [phone]            No results found for this or any previous visit (from the past 24 hour(s)). No results found. Seizure: Care Instructions  Your Care Instructions    Seizures are caused by abnormal patterns of electrical signals in the brain. They are different for each person. Seizures can affect movement, speech, vision, or awareness. Some people have only slight shaking of a hand and do not pass out. Other people may pass out and have violent shaking of the whole body. Some people appear to stare into space. They are awake, but they can't respond normally. Later, they may not remember what happened. You may need tests to identify the type and cause of the seizures. A seizure may occur only once, or you may have them more than one time. Taking medicines as directed and following up with your doctor may help keep you from having more seizures. The doctor has checked you carefully, but problems can develop later. If you notice any problems or new symptoms, get medical treatment right away. Follow-up care is a key part of your treatment and safety. Be sure to make and go to all appointments, and call your doctor if you are having problems. It's also a good idea to know your test results and keep a list of the medicines you take. How can you care for yourself at home? · Be safe with medicines. Take your medicines exactly as prescribed. Call your doctor if you think you are having a problem with your medicine. · Do not do any activity that could be dangerous to you or others until your doctor says it is safe to do so. For example, do not drive a car, operate machinery, swim, or climb ladders. · Be sure that anyone treating you for any health problem knows that you have had a seizure and what medicines you are taking for it.   · Identify and avoid things that may make you more likely to have a seizure. These may include lack of sleep, alcohol or drug use, stress, or not eating. · Make sure you go to your follow-up appointment. When should you call for help? Call 911 anytime you think you may need emergency care. For example, call if:  · You have another seizure. · You have more than one seizure in 24 hours. · You have new symptoms, such as trouble walking, speaking, or thinking clearly. Call your doctor now or seek immediate medical care if:  · You are not acting normally. Watch closely for changes in your health, and be sure to contact your doctor if you have any problems. Where can you learn more? Go to http://samy-georges.info/. Enter X725 in the search box to learn more about \"Seizure: Care Instructions. \"  Current as of: October 14, 2016  Content Version: 11.2  © 4280-1507 [employer]. Care instructions adapted under license by [employer] (which disclaims liability or warranty for this information). If you have questions about a medical condition or this instruction, always ask your healthcare professional. [place] [address] any warranty or liability for your use of this information. Scrapes (Abrasions) in Children: Care Instructions  Your Care Instructions  Scrapes (abrasions) are wounds where the skin has been rubbed or torn off. Most scrapes do not go deep into the skin, but some may remove several layers of skin. Scrapes usually don't bleed much, but they may ooze pinkish fluid. Scrapes on the head or face may appear worse than they are. They may bleed a lot because of the good blood supply to this area. Most scrapes heal well and may not need a bandage. They usually heal within 3 to 7 days. A large, deep scrape may take 1 to 2 weeks or longer to heal. A scab may form on some scrapes. Follow-up care is a key part of your child's treatment and safety.  Be sure to make and go to all appointments, and call your doctor if your child is having problems. It's also a good idea to know your child's test results and keep a list of the medicines your child takes. How can you care for your child at home? · If your doctor told you how to care for your child's wound, follow your doctor's instructions. If you did not get instructions, follow this general advice:  ¨ Wash the scrape with clean water 2 times a day. Don't use hydrogen peroxide or alcohol, which can slow healing. ¨ You may cover the scrape with a thin layer of petroleum jelly, such as Vaseline, and a nonstick bandage. ¨ Apply more petroleum jelly and replace the bandage as needed. · Prop up the injured area on a pillow anytime your child sits or lies down during the next 3 days. Try to keep it above the level of your child's heart. This will help reduce swelling. · Be safe with medicines. Give pain medicines exactly as directed. ¨ If the doctor gave your child a prescription medicine for pain, give it as prescribed. ¨ If your child is not taking a prescription pain medicine, ask your doctor if your child can take an over-the-counter medicine. When should you call for help? Call your doctor now or seek immediate medical care if:  · Your child has signs of infection, such as:  ¨ Increased pain, swelling, warmth, or redness around the scrape. ¨ Red streaks leading from the scrape. ¨ Pus draining from the scrape. ¨ A fever. · The scrape starts to bleed, and blood soaks through the bandage. Oozing small amounts of blood is normal.  Watch closely for changes in your child's health, and be sure to contact your doctor if the scrape is not getting better each day. Where can you learn more? Go to http://samy-georges.info/. Enter L258 in the search box to learn more about \"Scrapes (Abrasions) in Children: Care Instructions. \"  Current as of: May 27, 2016  Content Version: 11.2  © 7413-2385 [employer].  Care instructions adapted under license by [employer] (which disclaims liability or warranty for this information). If you have questions about a medical condition or this instruction, always ask your healthcare professional. [address] any warranty or liability for your use of this information.

## 2017-05-03 NOTE — ED NOTES
Discharge instructions given to pt. All questions answered and pt verbalized understanding. V/S stable @ time of discharge. Pt ambulatory out of unit. Pt has steady gait. Pt to be taken home by cab. No obvious signs of distress.

## 2017-05-03 NOTE — ED NOTES
[employer] transportation arranged for pt to home address. Confirmation number: [id number]  [facility] states pt will be picked up from ED waiting room no later than 0915 on 5/3/2017.

## 2017-05-04 LAB — LEVETIRACETAM SERPL-MCNC: 1.1 UG/ML (ref 10–40)

## 2017-05-05 ENCOUNTER — PATIENT OUTREACH (OUTPATIENT)
Dept: FAMILY MEDICINE CLINIC | Age: 32
End: 2017-05-05

## 2017-05-05 NOTE — PROGRESS NOTES
NNTOC-ED    Patient listed on discharge [facility]) report dated 5/3/17. Patient discharged from [facility] for seizure. Contacted patient to perform post ED/UC discharge assessment. Verified  and address with patient as identifiers. Provided introduction to self, and explanation of the NN role. Patient states, \"I'm doing okay. My tongue is still sore\". Performed medication reconciliation with patient, and patient verbalizes understanding of administration of home medications. Reviewed discharge instructions with patient. Patient verbalizes understand of discharge instructions and follow-up care. Patient reports that he has been unsuccessful with scheduling an appointment with Dr. Phyllis Corona. This writer contacted the office for the patient (with patient on the phone) and was able to get the patient scheduled for an appointment with one of the NPs within the practice, as Dr. Rolf Dubose schedule is full until July. Patient has been scheduled to follow-up with [facility] on 17@ 1:30 PM.  This writer emphasized the importance of keeping all appointments. Patient verbalized understanding. Future Appointments  Date Time Provider Denae Lugo   2017 2:00 PM Claudetta Cord, NP [facility]   Reviewed red flags with patient, and patient verbalizes understanding. No other clinical/social/functional needs noted. Patient given an opportunity to ask questions. Contact information provided to the patient for future reference or further questions. Red Flags:  Call 911 anytime you think you may need emergency care. For example, call if:  · You have another seizure. · You have more than one seizure in 24 hours. · You have new symptoms, such as trouble walking, speaking, or thinking clearly. Call your doctor now or seek immediate medical care if:  · You are not acting normally.

## 2017-05-10 ENCOUNTER — TELEPHONE (OUTPATIENT)
Dept: NEUROLOGY | Age: 32
End: 2017-05-10

## 2017-05-10 RX ORDER — LEVETIRACETAM 1000 MG/1
1000 TABLET ORAL 2 TIMES DAILY
Qty: 60 TAB | Refills: 0 | Status: SHIPPED | OUTPATIENT
Start: 2017-05-10 | End: 2017-05-31

## 2017-05-10 NOTE — TELEPHONE ENCOUNTER
----- Message from Magdi Castro sent at 5/10/2017 11:21 AM EDT -----  Regarding: Dr. Violette Browning([facility]) requested refill on pt's Rx(\"Levetiracetam\") called to [phone].

## 2017-05-17 ENCOUNTER — OFFICE VISIT (OUTPATIENT)
Dept: NEUROLOGY | Age: 32
End: 2017-05-17

## 2017-05-17 VITALS
WEIGHT: 220 LBS | BODY MASS INDEX: 32.58 KG/M2 | DIASTOLIC BLOOD PRESSURE: 70 MMHG | SYSTOLIC BLOOD PRESSURE: 128 MMHG | HEIGHT: 69 IN | RESPIRATION RATE: 20 BRPM

## 2017-05-17 DIAGNOSIS — G40.919 BREAKTHROUGH SEIZURE (HCC): ICD-10-CM

## 2017-05-17 DIAGNOSIS — R56.9 SEIZURE (HCC): Primary | ICD-10-CM

## 2017-05-17 DIAGNOSIS — R40.20 LOC (LOSS OF CONSCIOUSNESS) (HCC): Primary | ICD-10-CM

## 2017-05-17 DIAGNOSIS — R56.9 SEIZURE (HCC): ICD-10-CM

## 2017-05-17 RX ORDER — LEVETIRACETAM 500 MG/1
TABLET ORAL
Qty: 180 TAB | Refills: 5 | Status: SHIPPED | OUTPATIENT
Start: 2017-05-17 | End: 2018-08-16 | Stop reason: SINTOL

## 2017-05-17 NOTE — MR AVS SNAPSHOT
Visit Information Date & Time Provider Department Dept. Phone Encounter #  
 5/17/2017  2:00 PM Cynthia Willingham NP Neurology [facility] [phone] [id number] Follow-up Instructions Return after testing. Upcoming Health Maintenance Date Due DTaP/Tdap/Td series (2 - Td) 1/10/2015 INFLUENZA AGE 9 TO ADULT 8/1/2017 Allergies as of 5/17/2017  Review Complete On: 5/3/2017 By: Romana Arroyo Severity Noted Reaction Type Reactions Amlodipine  06/07/2016    Swelling Current Immunizations  Never Reviewed Name Date Influenza Vaccine 9/16/2014 Tdap 1/10/2005 Not reviewed this visit You Were Diagnosed With   
  
 Codes Comments Seizure [facility]    -  Primary ICD-10-CM: R56.9 ICD-9-CM: 780.39 Vitals BP Resp Height(growth percentile) Weight(growth percentile) BMI Smoking Status 128/70 20 5' 9\" (1.753 m) 220 lb (99.8 kg) 32.49 kg/m2 Never Smoker BMI and BSA Data Body Mass Index Body Surface Area  
 32.49 kg/m 2 2.2 m 2 Preferred Pharmacy [facility]acy Name Phone [id number] [phone] Your Updated Medication List  
  
   
This list is accurate as of: 5/17/17  2:24 PM.  Always use your most recent med list.  
  
  
  
  
 * levETIRAcetam 1,000 mg tablet Take 1 Tab by mouth two (2) times a day. * levETIRAcetam 500 mg tablet Commonly known as:  KEPPRA Take 3 tablets by mouth in the AM and 3 tablets by mouth in the PM  
  
 losartan 50 mg tablet Commonly known as:  COZAAR  
TAKE 1 TAB BY MOUTH DAILY. * Notice: This list has 2 medication(s) that are the same as other medications prescribed for you. Read the directions carefully, and ask your doctor or other care provider to review them with you. Prescriptions Sent to Pharmacy  Refills  
 levETIRAcetam (KEPPRA) 500 mg tablet 5  
 Sig: Take 3 tablets by mouth in the AM and 3 tablets by mouth in the PM  
 Class: Normal  
 Pharmacy: [address] Ph #: [phone] Follow-up Instructions Return after testing. To-Do List   
 05/17/2017 Imaging:  DUPLEX CAROTID BILATERAL AMB NEURO   
  
 05/17/2017 Neurology:  EEG AMB NEURO Patient Instructions PRESCRIPTION REFILL POLICY [facility] Statement to Patients April 1, 2014 In an effort to ensure the large volume of patient prescription refills is processed in the most efficient and expeditious manner, we are asking our patients to assist us by calling your Pharmacy for all prescription refills, this will include also your  Mail Order Pharmacy. The pharmacy will contact our office electronically to continue the refill process. Please do not wait until the last minute to call your pharmacy. We need at least 48 hours (2days) to fill prescriptions. We also encourage you to call your pharmacy before going to  your prescription to make sure it is ready. With regard to controlled substance prescription refill requests (narcotic refills) that need to be picked up at our office, we ask your cooperation by providing us with at least 72 hours (3days) notice that you will need a refill. We will not refill narcotic prescription refill requests after 4:00pm on any weekday, Monday through Thursday, or after 2:00pm on Fridays, or on the weekends. We encourage everyone to explore another way of getting your prescription refill request processed using [employer], our patient web portal through our electronic medical record system. [employer] is an efficient and effective way to communicate your medication request directly to the office and  downloadable as an joceline on your smart phone .  [employer] also features a review functionality that allows you to view your medication list as well as leave messages for your physician. Are you ready to get connected? If so please review the attatched instructions or speak to any of our staff to get you set up right away! Thank you so much for your cooperation. Should you have any questions please contact our Practice Administrator. The Physicians and Staff,  [facility] Souleymane Hernandez [address] What is a living will? A living will is a legal form you use to write down the kind of care you want at the end of your life. It is used by the health professionals who will treat you if you aren't able to decide for yourself. If you put your wishes in writing, your loved ones and others will know what kind of care you want. They won't need to guess. This can ease your mind and be helpful to others. A living will is not the same as an estate or property will. An estate will explains what you want to happen with your money and property after you die. Is a living will a legal document? A living will is a legal document. Each state has its own laws about living jimenez. If you move to another state, make sure that your living will is legal in the state where you now live. Or you might use a universal form that has been approved by many states. This kind of form can sometimes be completed and stored online. Your electronic copy will then be available wherever you have a connection to the Internet. In most cases, doctors will respect your wishes even if you have a form from a different state. · You don't need an  to complete a living will. But legal advice can be helpful if your state's laws are unclear, your health history is complicated, or your family can't agree on what should be in your living will. · You can change your living will at any time. Some people find that their wishes about end-of-life care change as their health changes.  
· In addition to making a living will, think about completing a medical power of  form. This form lets you name the person you want to make end-of-life treatment decisions for you (your \"health care agent\") if you're not able to. Many hospitals and nursing homes will give you the forms you need to complete a living will and a medical power of . · Your living will is used only if you can't make or communicate decisions for yourself anymore. If you become able to make decisions again, you can accept or refuse any treatment, no matter what you wrote in your living will. · Your state may offer an online registry. This is a place where you can store your living will online so the doctors and nurses who need to treat you can find it right away. What should you think about when creating a living will? Talk about your end-of-life wishes with your family members and your doctor. Let them know what you want. That way the people making decisions for you won't be surprised by your choices. Think about these questions as you make your living will: · Do you know enough about life support methods that might be used? If not, talk to your doctor so you know what might be done if you can't breathe on your own, your heart stops, or you're unable to swallow. · What things would you still want to be able to do after you receive life-support methods? Would you want to be able to walk? To speak? To eat on your own? To live without the help of machines? · If you have a choice, where do you want to be cared for? In your home? At a hospital or nursing home? · Do you want certain Advent practices performed if you become very ill? · If you have a choice at the end of your life, where would you prefer to die? At home? In a hospital or nursing home? Somewhere else? · Would you prefer to be buried or cremated? · Do you want your organs to be donated after you die? What should you do with your living will?  
· Make sure that your family members and your health care agent have copies of your living will. · Give your doctor a copy of your living will to keep in your medical record. If you have more than one doctor, make sure that each one has a copy. · You may want to put a copy of your living will where it can be easily found. Where can you learn more? Go to http://samy-georges.info/. Enter Q987 in the search box to learn more about \"Learning About Living Marcelo. \" Current as of: February 24, 2016 Content Version: 11.2 © 1248-4081 MagicEvent. Care instructions adapted under license by 2Checkout (which disclaims liability or warranty for this information). If you have questions about a medical condition or this instruction, always ask your healthcare professional. [address] any warranty or liability for your use of this information. Advance Directives: Care Instructions Your Care Instructions An advance directive is a legal way to state your wishes at the end of your life. It tells your family and your doctor what to do if you can no longer say what you want. There are two main types of advance directives. You can change them any time that your wishes change. · A living will tells your family and your doctor your wishes about life support and other treatment. · A durable power of  for health care lets you name a person to make treatment decisions for you when you can't speak for yourself. This person is called a health care agent. If you do not have an advance directive, decisions about your medical care may be made by a doctor or a  who doesn't know you. It may help to think of an advance directive as a gift to the people who care for you. If you have one, they won't have to make tough decisions by themselves. Follow-up care is a key part of your treatment and safety.  Be sure to make and go to all appointments, and call your doctor if you are having problems. It's also a good idea to know your test results and keep a list of the medicines you take. How can you care for yourself at home? · Discuss your wishes with your loved ones and your doctor. This way, there are no surprises. · Many states have a unique form. Or you might use a universal form that has been approved by many states. This kind of form can sometimes be completed and stored online. Your electronic copy will then be available wherever you have a connection to the Internet. In most cases, doctors will respect your wishes even if you have a form from a different state. · You don't need a  to do an advance directive. But you may want to get legal advice. · Think about these questions when you prepare an advance directive: ¨ Who do you want to make decisions about your medical care if you are not able to? Many people choose a family member or close friend. ¨ Do you know enough about life support methods that might be used? If not, talk to your doctor so you understand. ¨ What are you most afraid of that might happen? You might be afraid of having pain, losing your independence, or being kept alive by machines. ¨ Where would you prefer to die? Choices include your home, a hospital, or a nursing home. ¨ Would you like to have information about hospice care to support you and your family? ¨ Do you want to donate organs when you die? ¨ Do you want certain Zoroastrianism practices performed before you die? If so, put your wishes in the advance directive. · Read your advance directive every year, and make changes as needed. When should you call for help? Be sure to contact your doctor if you have any questions. Where can you learn more? Go to http://samy-georges.info/. Enter R264 in the search box to learn more about \"Advance Directives: Care Instructions. \" Current as of: November 17, 2016 Content Version: 11.2 © 7511-0249 Healthwise, Incorporated. Care instructions adapted under license by [employer] Mad Video (which disclaims liability or warranty for this information). If you have questions about a medical condition or this instruction, always ask your healthcare professional. [address] any warranty or liability for your use of this information. Introducing [facility] [place] [facility]! Jac Bro introduces [employer] patient portal. Now you can access parts of your medical record, email your doctor's office, and request medication refills online. 1. In your internet browser, go to [email or web address] 2. Click on the First Time User? Click Here link in the Sign In box. You will see the New Member Sign Up page. 3. Enter your [employer] Access Code exactly as it appears below. You will not need to use this code after youve completed the sign-up process. If you do not sign up before the expiration date, you must request a new code. · [employer] Access Code: [email or web address] Expires: 6/24/2017  2:55 PM 
 
4. Enter the last four digits of your Social Security Number (xxxx) and Date of Birth (mm/dd/yyyy) as indicated and click Submit. You will be taken to the next sign-up page. 5. Create a [employer]wl [employer] ID. This will be your [employer] login ID and cannot be changed, so think of one that is secure and easy to remember. 6. Create a [employer] password. You can change your password at any time. 7. Enter your Password Reset Question and Answer. This can be used at a later time if you forget your password. 8. Enter your e-mail address. You will receive e-mail notification when new information is available in [address] 9. Click Sign Up. You can now view and download portions of your medical record. 10. Click the Download Summary menu link to download a portable copy of your medical information.  
 
If you have questions, please visit the Frequently Asked Questions section of the [employer]. Remember, YellowHammert is NOT to be used for urgent needs. For medical emergencies, dial 911. Now available from your iPhone and Android! Please provide this summary of care documentation to your next provider. Your primary care clinician is listed as Franki Wright. If you have any questions after today's visit, please call [phone].

## 2017-05-17 NOTE — PATIENT INSTRUCTIONS
10 [facility]   Statement to Patients  April 1, 2014      In an effort to ensure the large volume of patient prescription refills is processed in the most efficient and expeditious manner, we are asking our patients to assist us by calling your Pharmacy for all prescription refills, this will include also your  Mail Order Pharmacy. The pharmacy will contact our office electronically to continue the refill process. Please do not wait until the last minute to call your pharmacy. We need at least 48 hours (2days) to fill prescriptions. We also encourage you to call your pharmacy before going to  your prescription to make sure it is ready. With regard to controlled substance prescription refill requests (narcotic refills) that need to be picked up at our office, we ask your cooperation by providing us with at least 72 hours (3days) notice that you will need a refill. We will not refill narcotic prescription refill requests after 4:00pm on any weekday, Monday through Thursday, or after 2:00pm on Fridays, or on the weekends. We encourage everyone to explore another way of getting your prescription refill request processed using [employer], our patient web portal through our electronic medical record system. [employer] is an efficient and effective way to communicate your medication request directly to the office and  downloadable as an joceline on your smart phone . [employer] also features a review functionality that allows you to view your medication list as well as leave messages for your physician. Are you ready to get connected? If so please review the attatched instructions or speak to any of our staff to get you set up right away! Thank you so much for your cooperation. Should you have any questions please contact our Practice Administrator. The Physicians and Staff,  [facility] [address]  What is a living will?   A living will is a legal form you use to write down the kind of care you want at the end of your life. It is used by the health professionals who will treat you if you aren't able to decide for yourself. If you put your wishes in writing, your loved ones and others will know what kind of care you want. They won't need to guess. This can ease your mind and be helpful to others. A living will is not the same as an estate or property will. An estate will explains what you want to happen with your money and property after you die. Is a living will a legal document? A living will is a legal document. Each state has its own laws about living jimenez. If you move to another state, make sure that your living will is legal in the state where you now live. Or you might use a universal form that has been approved by many states. This kind of form can sometimes be completed and stored online. Your electronic copy will then be available wherever you have a connection to the Internet. In most cases, doctors will respect your wishes even if you have a form from a different state. · You don't need an  to complete a living will. But legal advice can be helpful if your state's laws are unclear, your health history is complicated, or your family can't agree on what should be in your living will. · You can change your living will at any time. Some people find that their wishes about end-of-life care change as their health changes. · In addition to making a living will, think about completing a medical power of  form. This form lets you name the person you want to make end-of-life treatment decisions for you (your \"health care agent\") if you're not able to. Many hospitals and nursing homes will give you the forms you need to complete a living will and a medical power of . · Your living will is used only if you can't make or communicate decisions for yourself anymore.  If you become able to make decisions again, you can accept or refuse any treatment, no matter what you wrote in your living will. · Your state may offer an online registry. This is a place where you can store your living will online so the doctors and nurses who need to treat you can find it right away. What should you think about when creating a living will? Talk about your end-of-life wishes with your family members and your doctor. Let them know what you want. That way the people making decisions for you won't be surprised by your choices. Think about these questions as you make your living will:  · Do you know enough about life support methods that might be used? If not, talk to your doctor so you know what might be done if you can't breathe on your own, your heart stops, or you're unable to swallow. · What things would you still want to be able to do after you receive life-support methods? Would you want to be able to walk? To speak? To eat on your own? To live without the help of machines? · If you have a choice, where do you want to be cared for? In your home? At a hospital or nursing home? · Do you want certain Judaism practices performed if you become very ill? · If you have a choice at the end of your life, where would you prefer to die? At home? In a hospital or nursing home? Somewhere else? · Would you prefer to be buried or cremated? · Do you want your organs to be donated after you die? What should you do with your living will? · Make sure that your family members and your health care agent have copies of your living will. · Give your doctor a copy of your living will to keep in your medical record. If you have more than one doctor, make sure that each one has a copy. · You may want to put a copy of your living will where it can be easily found. Where can you learn more? Go to http://samy-georges.info/. Enter X249 in the search box to learn more about \"Learning About Living Perelmo. \"  Current as of: February 24, 2016  Content Version: 11.2  © 4678-2291 Lumi Mobile. Care instructions adapted under license by International Telematics (which disclaims liability or warranty for this information). If you have questions about a medical condition or this instruction, always ask your healthcare professional. [facility] any warranty or liability for your use of this information. Advance Directives: Care Instructions  Your Care Instructions  An advance directive is a legal way to state your wishes at the end of your life. It tells your family and your doctor what to do if you can no longer say what you want. There are two main types of advance directives. You can change them any time that your wishes change. · A living will tells your family and your doctor your wishes about life support and other treatment. · A durable power of  for health care lets you name a person to make treatment decisions for you when you can't speak for yourself. This person is called a health care agent. If you do not have an advance directive, decisions about your medical care may be made by a doctor or a  who doesn't know you. It may help to think of an advance directive as a gift to the people who care for you. If you have one, they won't have to make tough decisions by themselves. Follow-up care is a key part of your treatment and safety. Be sure to make and go to all appointments, and call your doctor if you are having problems. It's also a good idea to know your test results and keep a list of the medicines you take. How can you care for yourself at home? · Discuss your wishes with your loved ones and your doctor. This way, there are no surprises. · Many states have a unique form. Or you might use a universal form that has been approved by many states. This kind of form can sometimes be completed and stored online.  Your electronic copy will then be available wherever you have a connection to the Internet. In most cases, doctors will respect your wishes even if you have a form from a different state. · You don't need a  to do an advance directive. But you may want to get legal advice. · Think about these questions when you prepare an advance directive:  ¨ Who do you want to make decisions about your medical care if you are not able to? Many people choose a family member or close friend. ¨ Do you know enough about life support methods that might be used? If not, talk to your doctor so you understand. ¨ What are you most afraid of that might happen? You might be afraid of having pain, losing your independence, or being kept alive by machines. ¨ Where would you prefer to die? Choices include your home, a hospital, or a nursing home. ¨ Would you like to have information about hospice care to support you and your family? ¨ Do you want to donate organs when you die? ¨ Do you want certain Hinduism practices performed before you die? If so, put your wishes in the advance directive. · Read your advance directive every year, and make changes as needed. When should you call for help? Be sure to contact your doctor if you have any questions. Where can you learn more? Go to http://samy-georges.info/. Enter R264 in the search box to learn more about \"Advance Directives: Care Instructions. \"  Current as of: November 17, 2016  Content Version: 11.2  © 6730-3492 Spredfashion. Care instructions adapted under license by NeedFeed (which disclaims liability or warranty for this information). If you have questions about a medical condition or this instruction, always ask your healthcare professional. [address] any warranty or liability for your use of this information.

## 2017-05-17 NOTE — PROGRESS NOTES
uLis Cuba is a 33 y.o. male who presents with the following  Chief Complaint   Patient presents with    Seizure     5/3/15 last known       HPI Patient comes in for a follow up for seizures. Been taking Keppra 1000 mg BID and states he has been complaint but his level drawn in the ER was 1.1. He states on 5/3/2017 he had a breakthrough seizure. His x girlfriend witness. She said he was on the floor convulsing, jerking, bit his tongue. Lasted a few minutes then he went to the ER. He states his last seizure before this was June 2016. Recently he has been not sleeping well as his x girlfriend has been up all night and keeping him up so he has not had great sleep really for a while. Has been up and down and on the couch and such. Again he states compliance with his medications, no alcohol or drugs, and not sick or stressed. Nothing else has changed. He was very out of it for about 20 minutes then slept for a while after that. Allergies   Allergen Reactions    Amlodipine Swelling       Current Outpatient Prescriptions   Medication Sig    levETIRAcetam (KEPPRA) 500 mg tablet Take 3 tablets by mouth in the AM and 3 tablets by mouth in the PM    levETIRAcetam 1,000 mg tablet Take 1 Tab by mouth two (2) times a day.  losartan (COZAAR) 50 mg tablet TAKE 1 TAB BY MOUTH DAILY. No current facility-administered medications for this visit. History   Smoking Status    Never Smoker   Smokeless Tobacco    Never Used       Past Medical History:   Diagnosis Date    Essential hypertension 4/1/2016    Hypertension     Pneumonia Tania 10, 2014    Seizures [facility])        No past surgical history on file.     Family History   Problem Relation Age of Onset    Hypertension Mother     Heart Disease Father     Hypertension Father     Seizures Daughter        Social History     Social History    Marital status: SINGLE     Spouse name: N/A    Number of children: N/A    Years of education: N/A     Social History Main Topics    Smoking status: Never Smoker    Smokeless tobacco: Never Used    Alcohol use No    Drug use: No    Sexual activity: Yes     Partners: Female     Birth control/ protection: Pill     Other Topics Concern    Not on file     Social History Narrative       Review of Systems   HENT: Negative for hearing loss and tinnitus. Eyes: Negative for blurred vision, double vision and photophobia. Respiratory: Negative for shortness of breath and wheezing. Cardiovascular: Negative for chest pain and palpitations. Gastrointestinal: Negative for nausea and vomiting. Neurological: Positive for seizures and loss of consciousness. Negative for dizziness, tingling, tremors, weakness and headaches. Remainder of comprehensive review is negative. Physical Exam :    Visit Vitals    /70    Resp 20    Ht 5' 9\" (1.753 m)    Wt 99.8 kg (220 lb)    BMI 32.49 kg/m2       General: Well defined, nourished, and groomed individual in no acute distress.    Neck: Supple, nontender, no bruits, no pain with resistance to active range of motion.    Heart: Regular rate and rhythm, no murmurs, rub, or gallop. Normal S1S2. Lungs: Clear to auscultation bilaterally with equal chest expansion, no cough, no wheeze  Musculoskeletal: Extremities revealed no edema and had full range of motion of joints.    Psych: Good mood and bright affect    NEUROLOGICAL EXAMINATION:    Mental Status: Alert and oriented to person, place, and time    Cranial Nerves:    II, III, IV, VI: Visual acuity grossly intact. Visual fields are normal.    Pupils are equal, round, and reactive to light and accommodation.    Extra-ocular movements are full and fluid. Fundoscopic exam was benign, no ptosis or nystagmus.    V-XII: Hearing is grossly intact. Facial features are symmetric, with normal sensation and strength. The palate rises symmetrically and the tongue protrudes midline. Sternocleidomastoids 5/5.      Motor Examination: Normal tone, bulk, and strength, 5/5 muscle strength throughout. Coordination: Finger to nose was normal. No resting or intention tremor    Gait and Station: Steady while walking. Normal arm swing. No pronator drift. No muscle wasting or fasiculations noted. Reflexes: DTRs 2+ throughout. Neurosensory Exam:  Stocking glove sensory loss to temperature, vibration, and pinprick to the thighs. Results for orders placed or performed during the hospital encounter of 05/03/17   LEVETIRACETAM (KEPPRA)   Result Value Ref Range    Levetiracetam (Keppra) 1.1 (L) 10.0 - 40.0 ug/mL       Orders Placed This Encounter    DUPLEX CAROTID BILATERAL AMB NEURO ([id number])     Standing Status:   Future     Standing Expiration Date:   5/17/2018     Order Specific Question:   Reason for Exam:     Answer:   seizures, loc    EEG AMB NEURO ([id number])     Standing Status:   Future     Standing Expiration Date:   11/17/2017     Order Specific Question:   Reason for Exam:     Answer:   seizures    levETIRAcetam (KEPPRA) 500 mg tablet     Sig: Take 3 tablets by mouth in the AM and 3 tablets by mouth in the PM     Dispense:  180 Tab     Refill:  5       1. Seizure [facility])        Follow-up Disposition:  Return after testing. Breakthrough seizure. On Keppra we will increase to 1500 mg BID. Level was very low. Will recheck as with higher dose in the future. States complaince. Sleep deprivation may be the key here. Needs better sleep and routine. He will get an EEG and doppler evaluating for epileptiform and blood flow to the brain. He will call with any seizures. Understands what drops the threshold. No driving 6 months.              This note will not be viewable in Tipprt

## 2017-05-22 NOTE — PROCEDURES
Carotid Doppler:     Date:  05/17/17    Requesting Physician:  Yoan Kimble NP     Indication:  Syncope. B-mode imaging reveals no significant plaque throughout the carotid systems bilaterally. Doppler spectral analysis reveals no elevated velocities. Vertebral artery flow antegrade bilaterally. Interpretation:   Normal study.

## 2017-05-26 ENCOUNTER — OFFICE VISIT (OUTPATIENT)
Dept: NEUROLOGY | Age: 32
End: 2017-05-26

## 2017-05-26 DIAGNOSIS — R56.9 SEIZURE (HCC): ICD-10-CM

## 2017-05-31 ENCOUNTER — HOSPITAL ENCOUNTER (EMERGENCY)
Age: 32
Discharge: HOME | End: 2017-05-31
Attending: EMERGENCY MEDICINE
Payer: MEDICAID

## 2017-05-31 ENCOUNTER — APPOINTMENT (OUTPATIENT)
Dept: GENERAL RADIOLOGY | Age: 32
End: 2017-05-31
Attending: PHYSICIAN ASSISTANT
Payer: MEDICAID

## 2017-05-31 ENCOUNTER — APPOINTMENT (OUTPATIENT)
Dept: CT IMAGING | Age: 32
End: 2017-05-31
Attending: PHYSICIAN ASSISTANT
Payer: MEDICAID

## 2017-05-31 ENCOUNTER — OFFICE VISIT (OUTPATIENT)
Dept: NEUROLOGY | Age: 32
End: 2017-05-31

## 2017-05-31 VITALS
SYSTOLIC BLOOD PRESSURE: 136 MMHG | HEART RATE: 64 BPM | OXYGEN SATURATION: 98 % | WEIGHT: 230.4 LBS | HEIGHT: 69 IN | TEMPERATURE: 98.5 F | BODY MASS INDEX: 34.13 KG/M2 | DIASTOLIC BLOOD PRESSURE: 87 MMHG | RESPIRATION RATE: 16 BRPM

## 2017-05-31 VITALS
WEIGHT: 230 LBS | DIASTOLIC BLOOD PRESSURE: 78 MMHG | HEIGHT: 69 IN | BODY MASS INDEX: 34.07 KG/M2 | SYSTOLIC BLOOD PRESSURE: 140 MMHG | RESPIRATION RATE: 20 BRPM

## 2017-05-31 DIAGNOSIS — R07.9 CHEST PAIN, UNSPECIFIED TYPE: Primary | ICD-10-CM

## 2017-05-31 DIAGNOSIS — R51.9 ACUTE NONINTRACTABLE HEADACHE, UNSPECIFIED HEADACHE TYPE: ICD-10-CM

## 2017-05-31 DIAGNOSIS — R56.9 SEIZURE (HCC): Primary | ICD-10-CM

## 2017-05-31 LAB
ALBUMIN SERPL BCP-MCNC: 3.8 G/DL (ref 3.5–5)
ALBUMIN/GLOB SERPL: 0.9 {RATIO} (ref 1.1–2.2)
ALP SERPL-CCNC: 89 U/L (ref 45–117)
ALT SERPL-CCNC: 30 U/L (ref 12–78)
ANION GAP BLD CALC-SCNC: 7 MMOL/L (ref 5–15)
APPEARANCE UR: ABNORMAL
AST SERPL W P-5'-P-CCNC: 20 U/L (ref 15–37)
ATRIAL RATE: 65 BPM
BASOPHILS # BLD AUTO: 0 K/UL (ref 0–0.1)
BASOPHILS # BLD: 0 % (ref 0–1)
BILIRUB SERPL-MCNC: 0.4 MG/DL (ref 0.2–1)
BILIRUB UR QL: NEGATIVE
BUN SERPL-MCNC: 9 MG/DL (ref 6–20)
BUN/CREAT SERPL: 8 (ref 12–20)
CALCIUM SERPL-MCNC: 8.8 MG/DL (ref 8.5–10.1)
CALCULATED P AXIS, ECG09: 55 DEGREES
CALCULATED R AXIS, ECG10: 22 DEGREES
CALCULATED T AXIS, ECG11: 6 DEGREES
CHLORIDE SERPL-SCNC: 102 MMOL/L (ref 97–108)
CK MB CFR SERPL CALC: 0.2 % (ref 0–2.5)
CK MB SERPL-MCNC: 1 NG/ML (ref 5–25)
CK SERPL-CCNC: 421 U/L (ref 39–308)
CO2 SERPL-SCNC: 28 MMOL/L (ref 21–32)
COLOR UR: ABNORMAL
CREAT SERPL-MCNC: 1.09 MG/DL (ref 0.7–1.3)
DIAGNOSIS, 93000: NORMAL
EOSINOPHIL # BLD: 0.1 K/UL (ref 0–0.4)
EOSINOPHIL NFR BLD: 1 % (ref 0–7)
ERYTHROCYTE [DISTWIDTH] IN BLOOD BY AUTOMATED COUNT: 13 % (ref 11.5–14.5)
GLOBULIN SER CALC-MCNC: 4.2 G/DL (ref 2–4)
GLUCOSE SERPL-MCNC: 94 MG/DL (ref 65–100)
GLUCOSE UR STRIP.AUTO-MCNC: NEGATIVE MG/DL
HCT VFR BLD AUTO: 40.8 % (ref 36.6–50.3)
HGB BLD-MCNC: 13.9 G/DL (ref 12.1–17)
HGB UR QL STRIP: NEGATIVE
KETONES UR QL STRIP.AUTO: NEGATIVE MG/DL
LEUKOCYTE ESTERASE UR QL STRIP.AUTO: NEGATIVE
LYMPHOCYTES # BLD AUTO: 21 % (ref 12–49)
LYMPHOCYTES # BLD: 2.1 K/UL (ref 0.8–3.5)
MCH RBC QN AUTO: 29.1 PG (ref 26–34)
MCHC RBC AUTO-ENTMCNC: 34.1 G/DL (ref 30–36.5)
MCV RBC AUTO: 85.5 FL (ref 80–99)
MONOCYTES # BLD: 0.5 K/UL (ref 0–1)
MONOCYTES NFR BLD AUTO: 5 % (ref 5–13)
NEUTS SEG # BLD: 7.3 K/UL (ref 1.8–8)
NEUTS SEG NFR BLD AUTO: 73 % (ref 32–75)
NITRITE UR QL STRIP.AUTO: NEGATIVE
P-R INTERVAL, ECG05: 158 MS
PH UR STRIP: 6.5 [PH] (ref 5–8)
PLATELET # BLD AUTO: 264 K/UL (ref 150–400)
POTASSIUM SERPL-SCNC: 3.5 MMOL/L (ref 3.5–5.1)
PROT SERPL-MCNC: 8 G/DL (ref 6.4–8.2)
PROT UR STRIP-MCNC: NEGATIVE MG/DL
Q-T INTERVAL, ECG07: 380 MS
QRS DURATION, ECG06: 92 MS
QTC CALCULATION (BEZET), ECG08: 395 MS
RBC # BLD AUTO: 4.77 M/UL (ref 4.1–5.7)
SODIUM SERPL-SCNC: 137 MMOL/L (ref 136–145)
SP GR UR REFRACTOMETRY: 1.02 (ref 1–1.03)
TROPONIN I SERPL-MCNC: <0.04 NG/ML
UROBILINOGEN UR QL STRIP.AUTO: 0.2 EU/DL (ref 0.2–1)
VENTRICULAR RATE, ECG03: 65 BPM
WBC # BLD AUTO: 10 K/UL (ref 4.1–11.1)

## 2017-05-31 PROCEDURE — 84484 ASSAY OF TROPONIN QUANT: CPT | Performed by: PHYSICIAN ASSISTANT

## 2017-05-31 PROCEDURE — 99283 EMERGENCY DEPT VISIT LOW MDM: CPT

## 2017-05-31 PROCEDURE — 70450 CT HEAD/BRAIN W/O DYE: CPT

## 2017-05-31 PROCEDURE — 74011250637 HC RX REV CODE- 250/637: Performed by: PHYSICIAN ASSISTANT

## 2017-05-31 PROCEDURE — 74011000250 HC RX REV CODE- 250: Performed by: PHYSICIAN ASSISTANT

## 2017-05-31 PROCEDURE — 96361 HYDRATE IV INFUSION ADD-ON: CPT

## 2017-05-31 PROCEDURE — 71020 XR CHEST PA LAT: CPT

## 2017-05-31 PROCEDURE — 36415 COLL VENOUS BLD VENIPUNCTURE: CPT | Performed by: PHYSICIAN ASSISTANT

## 2017-05-31 PROCEDURE — 82553 CREATINE MB FRACTION: CPT | Performed by: PHYSICIAN ASSISTANT

## 2017-05-31 PROCEDURE — 96375 TX/PRO/DX INJ NEW DRUG ADDON: CPT

## 2017-05-31 PROCEDURE — 93005 ELECTROCARDIOGRAM TRACING: CPT

## 2017-05-31 PROCEDURE — 74011250636 HC RX REV CODE- 250/636: Performed by: PHYSICIAN ASSISTANT

## 2017-05-31 PROCEDURE — 96374 THER/PROPH/DIAG INJ IV PUSH: CPT

## 2017-05-31 PROCEDURE — 82550 ASSAY OF CK (CPK): CPT | Performed by: PHYSICIAN ASSISTANT

## 2017-05-31 PROCEDURE — 80053 COMPREHEN METABOLIC PANEL: CPT | Performed by: PHYSICIAN ASSISTANT

## 2017-05-31 PROCEDURE — 81003 URINALYSIS AUTO W/O SCOPE: CPT | Performed by: PHYSICIAN ASSISTANT

## 2017-05-31 PROCEDURE — 85025 COMPLETE CBC W/AUTO DIFF WBC: CPT | Performed by: PHYSICIAN ASSISTANT

## 2017-05-31 RX ORDER — CLONIDINE HYDROCHLORIDE 0.1 MG/1
0.2 TABLET ORAL
Status: DISCONTINUED | OUTPATIENT
Start: 2017-05-31 | End: 2017-05-31 | Stop reason: HOSPADM

## 2017-05-31 RX ORDER — KETOROLAC TROMETHAMINE 30 MG/ML
30 INJECTION, SOLUTION INTRAMUSCULAR; INTRAVENOUS
Status: COMPLETED | OUTPATIENT
Start: 2017-05-31 | End: 2017-05-31

## 2017-05-31 RX ORDER — PROCHLORPERAZINE EDISYLATE 5 MG/ML
10 INJECTION INTRAMUSCULAR; INTRAVENOUS
Status: DISCONTINUED | OUTPATIENT
Start: 2017-05-31 | End: 2017-05-31 | Stop reason: SDUPTHER

## 2017-05-31 RX ORDER — DIPHENHYDRAMINE HYDROCHLORIDE 50 MG/ML
25 INJECTION, SOLUTION INTRAMUSCULAR; INTRAVENOUS
Status: COMPLETED | OUTPATIENT
Start: 2017-05-31 | End: 2017-05-31

## 2017-05-31 RX ORDER — SODIUM CHLORIDE 9 MG/ML
1000 INJECTION, SOLUTION INTRAVENOUS ONCE
Status: COMPLETED | OUTPATIENT
Start: 2017-05-31 | End: 2017-05-31

## 2017-05-31 RX ORDER — CALC/MAG/B COMPLEX/D3/HERB 61
15 TABLET ORAL DAILY
Qty: 10 CAP | Refills: 0 | Status: SHIPPED | OUTPATIENT
Start: 2017-05-31 | End: 2017-05-31

## 2017-05-31 RX ADMIN — KETOROLAC TROMETHAMINE 30 MG: 30 INJECTION, SOLUTION INTRAMUSCULAR at 01:52

## 2017-05-31 RX ADMIN — SODIUM CHLORIDE 1000 ML/HR: 900 INJECTION, SOLUTION INTRAVENOUS at 01:52

## 2017-05-31 RX ADMIN — DIPHENHYDRAMINE HYDROCHLORIDE 25 MG: 50 INJECTION, SOLUTION INTRAMUSCULAR; INTRAVENOUS at 01:52

## 2017-05-31 RX ADMIN — SODIUM CHLORIDE 10 MG: 9 INJECTION INTRAMUSCULAR; INTRAVENOUS; SUBCUTANEOUS at 01:52

## 2017-05-31 NOTE — MR AVS SNAPSHOT
Visit Information Date & Time Provider Department Dept. Phone Encounter #  
 5/31/2017  2:00 PM Pratik Saini NP Neurology [facility] [phone] [id number] Follow-up Instructions Return in about 3 months (around 8/31/2017). Upcoming Health Maintenance Date Due DTaP/Tdap/Td series (2 - Td) 1/10/2015 INFLUENZA AGE 9 TO ADULT 8/1/2017 Allergies as of 5/31/2017  Review Complete On: 5/31/2017 By: Masood Arriola MD  
  
 Severity Noted Reaction Type Reactions Amlodipine  06/07/2016    Swelling Current Immunizations  Never Reviewed Name Date Influenza Vaccine 9/16/2014 Tdap 1/10/2005 Not reviewed this visit You Were Diagnosed With   
  
 Codes Comments [facility]    -  Primary ICD-10-CM: R56.9 ICD-9-CM: 780.39 Vitals BP Resp Height(growth percentile) Weight(growth percentile) BMI Smoking Status 140/78 20 5' 9\" (1.753 m) 230 lb (104.3 kg) 33.97 kg/m2 Never Smoker BMI and BSA Data Body Mass Index Body Surface Area  
 33.97 kg/m 2 2.25 m 2 Preferred Pharmacy Pharmacy Name Phone [id number] [phone] Your Updated Medication List  
  
   
This list is accurate as of: 5/31/17  2:24 PM.  Always use your most recent med list.  
  
  
  
  
 levETIRAcetam 500 mg tablet Commonly known as:  KEPPRA Take 3 tablets by mouth in the AM and 3 tablets by mouth in the PM  
  
 losartan 50 mg tablet Commonly known as:  COZAAR  
TAKE 1 TAB BY MOUTH DAILY. We Performed the Following LEVETIRACETAM (KEPPRA) [id number] CPT(R)] Follow-up Instructions Return in about 3 months (around 8/31/2017). Patient Instructions PRESCRIPTION REFILL POLICY [facility] Statement to Patients April 1, 2014 In an effort to ensure the large volume of patient prescription refills is processed in the most efficient and expeditious manner, we are asking our patients to assist us by calling your Pharmacy for all prescription refills, this will include also your  Mail Order Pharmacy. The pharmacy will contact our office electronically to continue the refill process. Please do not wait until the last minute to call your pharmacy. We need at least 48 hours (2days) to fill prescriptions. We also encourage you to call your pharmacy before going to  your prescription to make sure it is ready. With regard to controlled substance prescription refill requests (narcotic refills) that need to be picked up at our office, we ask your cooperation by providing us with at least 72 hours (3days) notice that you will need a refill. We will not refill narcotic prescription refill requests after 4:00pm on any weekday, Monday through Thursday, or after 2:00pm on Fridays, or on the weekends. We encourage everyone to explore another way of getting your prescription refill request processed using Hansoft, our patient web portal through our electronic medical record system. Hansoft is an efficient and effective way to communicate your medication request directly to the office and  downloadable as an joceline on your smart phone . [employer]oft also features a review functionality that allows you to view your medication list as well as leave messages for your physician. Are you ready to get connected? If so please review the attatched instructions or speak to any of our staff to get you set up right away! Thank you so much for your cooperation. Should you have any questions please contact our Practice Administrator. The Physicians and Staff,  [facility] Soule[address]ane Hernandez [address] What is a living will? A living will is a legal form you use to write down the kind of care you want at the end of your life.  It is used by the health professionals who will treat you if you aren't able to decide for yourself. If you put your wishes in writing, your loved ones and others will know what kind of care you want. They won't need to guess. This can ease your mind and be helpful to others. A living will is not the same as an estate or property will. An estate will explains what you want to happen with your money and property after you die. Is a living will a legal document? A living will is a legal document. Each state has its own laws about living jimenez. If you move to another state, make sure that your living will is legal in the state where you now live. Or you might use a universal form that has been approved by many states. This kind of form can sometimes be completed and stored online. Your electronic copy will then be available wherever you have a connection to the Internet. In most cases, doctors will respect your wishes even if you have a form from a different state. · You don't need an  to complete a living will. But legal advice can be helpful if your state's laws are unclear, your health history is complicated, or your family can't agree on what should be in your living will. · You can change your living will at any time. Some people find that their wishes about end-of-life care change as their health changes. · In addition to making a living will, think about completing a medical power of  form. This form lets you name the person you want to make end-of-life treatment decisions for you (your \"health care agent\") if you're not able to. Many hospitals and nursing homes will give you the forms you need to complete a living will and a medical power of . · Your living will is used only if you can't make or communicate decisions for yourself anymore. If you become able to make decisions again, you can accept or refuse any treatment, no matter what you wrote in your living will. · Your state may offer an online registry. This is a place where you can store your living will online so the doctors and nurses who need to treat you can find it right away. What should you think about when creating a living will? Talk about your end-of-life wishes with your family members and your doctor. Let them know what you want. That way the people making decisions for you won't be surprised by your choices. Think about these questions as you make your living will: · Do you know enough about life support methods that might be used? If not, talk to your doctor so you know what might be done if you can't breathe on your own, your heart stops, or you're unable to swallow. · What things would you still want to be able to do after you receive life-support methods? Would you want to be able to walk? To speak? To eat on your own? To live without the help of machines? · If you have a choice, where do you want to be cared for? In your home? At a hospital or nursing home? · Do you want certain Religion practices performed if you become very ill? · If you have a choice at the end of your life, where would you prefer to die? At home? In a hospital or nursing home? Somewhere else? · Would you prefer to be buried or cremated? · Do you want your organs to be donated after you die? What should you do with your living will? · Make sure that your family members and your health care agent have copies of your living will. · Give your doctor a copy of your living will to keep in your medical record. If you have more than one doctor, make sure that each one has a copy. · You may want to put a copy of your living will where it can be easily found. Where can you learn more? Go to http://samy-georges.info/. Enter D466 in the search box to learn more about \"Learning About Living Marcelo. \" Current as of: February 24, 2016 Content Version: 11.2 © 7858-1870 Healthwise, Well[employer]. Care instructions adapted under license by [employer] (which disclaims liability or warranty for this information). If you have questions about a medical condition or this instruction, always ask your healthcare professional. [address] any warranty or liability for your use of this information. Advance Directives: Care Instructions Your Care Instructions An advance directive is a legal way to state your wishes at the end of your life. It tells your family and your doctor what to do if you can no longer say what you want. There are two main types of advance directives. You can change them any time that your wishes change. · A living will tells your family and your doctor your wishes about life support and other treatment. · A durable power of  for health care lets you name a person to make treatment decisions for you when you can't speak for yourself. This person is called a health care agent. If you do not have an advance directive, decisions about your medical care may be made by a doctor or a  who doesn't know you. It may help to think of an advance directive as a gift to the people who care for you. If you have one, they won't have to make tough decisions by themselves. Follow-up care is a key part of your treatment and safety. Be sure to make and go to all appointments, and call your doctor if you are having problems. It's also a good idea to know your test results and keep a list of the medicines you take. How can you care for yourself at home? · Discuss your wishes with your loved ones and your doctor. This way, there are no surprises. · Many states have a unique form. Or you might use a universal form that has been approved by many states. This kind of form can sometimes be completed and stored online.  Your electronic copy will then be available wherever you have a connection to the Internet. In most cases, doctors will respect your wishes even if you have a form from a different state. · You don't need a  to do an advance directive. But you may want to get legal advice. · Think about these questions when you prepare an advance directive: ¨ Who do you want to make decisions about your medical care if you are not able to? Many people choose a family member or close friend. ¨ Do you know enough about life support methods that might be used? If not, talk to your doctor so you understand. ¨ What are you most afraid of that might happen? You might be afraid of having pain, losing your independence, or being kept alive by machines. ¨ Where would you prefer to die? Choices include your home, a hospital, or a nursing home. ¨ Would you like to have information about hospice care to support you and your family? ¨ Do you want to donate organs when you die? ¨ Do you want certain Christianity practices performed before you die? If so, put your wishes in the advance directive. · Read your advance directive every year, and make changes as needed. When should you call for help? Be sure to contact your doctor if you have any questions. Where can you learn more? Go to http://samy-georgse.info/. Enter R264 in the search box to learn more about \"Advance Directives: Care Instructions. \" Current as of: November 17, 2016 Content Version: 11.2 © 0374-1663 Healthwise, Incorporated. Care instructions adapted under license by [employer] (which disclaims liability or warranty for this information). If you have questions about a medical condition or this instruction, always ask your healthcare professional. [address] any warranty or liability for your use of this information. Introducing [facility]!    
 [facility] [employer] introduces Atonarp patient portal. Now you can access parts of your medical record, email your doctor's office, and request medication refills online. 1. In your internet browser, go to [email or web address] 2. Click on the First Time User? Click Here link in the Sign In box. You will see the New Member Sign Up page. 3. Enter your Pyreos Access Code exactly as it appears below. You will not need to use this code after youve completed the sign-up process. If you do not sign up before the expiration date, you must request a new code. · Pyreos Access Code: [email or web address] Expires: 6/24/2017  2:55 PM 
 
4. Enter the last four digits of your Social Security Number (xxxx) and Date of Birth (mm/dd/yyyy) as indicated and click Submit. You will be taken to the next sign-up page. 5. Create a Pyreos ID. This will be your Pyreos login ID and cannot be changed, so think of one that is secure and easy to remember. 6. Create a Pyreos password. You can change your password at any time. 7. Enter your Password Reset Question and Answer. This can be used at a later time if you forget your password. 8. Enter your e-mail address. You will receive e-mail notification when new information is available in [address] 9. Click Sign Up. You can now view and download portions of your medical record. 10. Click the Download Summary menu link to download a portable copy of your medical information. If you have questions, please visit the Frequently Asked Questions section of the Pyreos website. Remember, Pyreos is NOT to be used for urgent needs. For medical emergencies, dial 911. Now available from your iPhone and Android! Please provide this summary of care documentation to your next provider. Your primary care clinician is listed as Vernon Olsen. If you have any questions after today's visit, please call [phone].

## 2017-05-31 NOTE — ED NOTES
I have reviewed discharge instructions with the patient. The patient verbalized understanding.  Mark called for transport home; reference number [id number]

## 2017-05-31 NOTE — ED PROVIDER NOTES
HPI Comments: 27-year-old [facility] American male with medical history remarkable for seizure disorder hypertension presented by EMS with complaint of left-sided chest pain described as burning in nature radiating up the chest associated nausea without vomiting since about noon yesterday without any recognized provoking, alleviating or modifying factors. He reports associated headache described as diffuse, aching, constant in nature. No medications prior to arrival. Reports concern for blood pressure eleavtion. Similar headaches with elevated blood pressure in the past. Associated diaphoresis, extremity numbness, extremity weakness, visual changes, speech change, gait imbalance, lower extremity edema, vomiting, diarrhea, constipation or urinary complaints. Patient is a 33 y.o. male presenting with chest pain and headaches. The history is provided by the patient. Chest Pain (Angina)    Associated symptoms include headaches and nausea. Pertinent negatives include no abdominal pain, no cough, no fever, no numbness, no palpitations, no shortness of breath, no vomiting and no weakness. Headache    Associated symptoms include nausea. Pertinent negatives include no fever, no palpitations, no shortness of breath, no weakness and no vomiting. Past Medical History:   Diagnosis Date    Essential hypertension 4/1/2016    Hypertension     Pneumonia Tania 10, 2014    Seizures [facility])        No past surgical history on file. Family History:   Problem Relation Age of Onset    Hypertension Mother     Heart Disease Father     Hypertension Father     Seizures Daughter        Social History     Social History    Marital status: SINGLE     Spouse name: N/A    Number of children: N/A    Years of education: N/A     Occupational History    Not on file.      Social History Main Topics    Smoking status: Never Smoker    Smokeless tobacco: Never Used    Alcohol use No    Drug use: No    Sexual activity: Yes Partners: Female     Birth control/ protection: Pill     Other Topics Concern    Not on file     Social History Narrative         ALLERGIES: Amlodipine    Review of Systems   Constitutional: Negative. Negative for chills and fever. HENT: Negative for congestion, ear pain, rhinorrhea, sore throat and voice change. Eyes: Negative. Negative for photophobia, pain and itching. Respiratory: Negative for cough, chest tightness and shortness of breath. Cardiovascular: Positive for chest pain. Negative for palpitations. Gastrointestinal: Positive for nausea. Negative for abdominal distention, abdominal pain, constipation, diarrhea and vomiting. Genitourinary: Negative for difficulty urinating, dysuria, frequency and urgency. Musculoskeletal: Negative for joint swelling and neck stiffness. Neurological: Positive for headaches. Negative for weakness and numbness. Psychiatric/Behavioral: Negative for confusion and decreased concentration. All other systems reviewed and are negative. Vitals:    05/31/17 0052 05/31/17 0153   BP: (!) 166/114 (!) 146/96   Pulse: 60    Resp: 18    Temp: 98 °F (36.7 °C)    SpO2: 97%    Weight: 104.5 kg (230 lb 6.4 oz)    Height: 5' 9\" (1.753 m)             Physical Exam   Constitutional: He is oriented to person, place, and time. He appears well-developed and well-nourished. No distress. AAM well appearing in NAD   HENT:   Head: Normocephalic and atraumatic. Right Ear: External ear normal.   Left Ear: External ear normal.   Nose: Nose normal.   Mouth/Throat: Oropharynx is clear and moist. No oropharyngeal exudate. Eyes: Conjunctivae and EOM are normal. Pupils are equal, round, and reactive to light. Right eye exhibits no discharge. Left eye exhibits no discharge. Neck: Normal range of motion. Neck supple. Cardiovascular: Normal rate, regular rhythm and normal heart sounds. Pulmonary/Chest: Effort normal and breath sounds normal. He has no wheezes.  He has no rales.   Abdominal: Soft. Bowel sounds are normal. He exhibits no distension. There is no tenderness. There is no guarding. Musculoskeletal: Normal range of motion. Lymphadenopathy:     He has no cervical adenopathy. Neurological: He is alert and oriented to person, place, and time. No cranial nerve deficit. Skin: Skin is warm and dry. He is not diaphoretic. Psychiatric: He has a normal mood and affect. His behavior is normal.   Nursing note and vitals reviewed. MDM  Number of Diagnoses or Management Options  Acute nonintractable headache, unspecified headache type:   Chest pain, unspecified type:   Diagnosis management comments:   78[facility] with complaint of headache and chest pain since yesterday afternoon. Patient appears clinically well on exam with stable vitals except for elevated blood pressure. We'll assess for ACS versus electronic derangement versus intracranial hemorrhage amongst others. Plan  EKG, troponin, CK-MB, CBC, CMP, CT head, x-ray chest, IV fluid, analgesia and reassess. Luis Carrillo         Amount and/or Complexity of Data Reviewed  Clinical lab tests: ordered and reviewed  Tests in the radiology section of CPT®: ordered and reviewed  Discuss the patient with other providers: yes  Independent visualization of images, tracings, or specimens: yes      ED Course       Procedures      Progress note  EKG interpretation: (Preliminary)  Rhythm: normal sinus rhythm; and regular . Rate (approx.): 65; Axis: normal; P wave: normal; QRS interval: normal ; ST/T wave: normal; in  Leads Other findings: normal. FORTINO Carrillo    EKG seen and interpreted independently by ED attending. Luis Carrillo      Labs and imaging reviewed. The patient felt much better. Luis Carrillo    Patient's results have been reviewed with them.   Patient and/or family have verbally conveyed their understanding and agreement of the patient's signs, symptoms, diagnosis, treatment and prognosis and additionally agree to follow up as recommended or return to the Emergency Room should their condition change prior to follow-up. Discharge instructions have also been provided to the patient with some educational information regarding their diagnosis as well a list of reasons why they would want to return to the ER prior to their follow-up appointment should their condition change. Luis Hernandes    A/P  Headache/chest pain:Follow-up with regular doctor. Return for any new or worsening. Start taking Prevacid daily.  Luis Pratt

## 2017-05-31 NOTE — PROGRESS NOTES
Lisa Beebe is a 33 y.o. male who presents with the following  Chief Complaint   Patient presents with    Seizure       HPI Patient comes in for a follow up for EEG and doppler. He has been doing well since last visit. On Keppra 1500 mg BID and doing well with this. No seizure activity or auras. His last keppra level was 1.1. No other changes. In ER this morning for chest pain and dizziness and high blood pressure. Allergies   Allergen Reactions    Amlodipine Swelling       Current Outpatient Prescriptions   Medication Sig    levETIRAcetam (KEPPRA) 500 mg tablet Take 3 tablets by mouth in the AM and 3 tablets by mouth in the PM    losartan (COZAAR) 50 mg tablet TAKE 1 TAB BY MOUTH DAILY. No current facility-administered medications for this visit. History   Smoking Status    Never Smoker   Smokeless Tobacco    Never Used       Past Medical History:   Diagnosis Date    Essential hypertension 4/1/2016    Hypertension     Pneumonia Atnia 10, 2014    Seizures [facility])        No past surgical history on file. Family History   Problem Relation Age of Onset    Hypertension Mother     Heart Disease Father     Hypertension Father     Seizures Daughter        Social History     Social History    Marital status: SINGLE     Spouse name: N/A    Number of children: N/A    Years of education: N/A     Social History Main Topics    Smoking status: Never Smoker    Smokeless tobacco: Never Used    Alcohol use No    Drug use: No    Sexual activity: Yes     Partners: Female     Birth control/ protection: Pill     Other Topics Concern    Not on file     Social History Narrative       Review of Systems   HENT: Negative for hearing loss and tinnitus. Eyes: Negative for blurred vision, double vision and photophobia. Gastrointestinal: Negative for nausea and vomiting. Neurological: Negative for dizziness, tingling, tremors, seizures, loss of consciousness, weakness and headaches.        Remainder of comprehensive review is negative. Physical Exam :    Visit Vitals    /78    Resp 20    Ht 5' 9\" (1.753 m)    Wt 104.3 kg (230 lb)    BMI 33.97 kg/m2               Results for orders placed or performed during the hospital encounter of 05/31/17   CBC WITH AUTOMATED DIFF   Result Value Ref Range    WBC 10.0 4.1 - 11.1 K/uL    RBC 4.77 4.10 - 5.70 M/uL    HGB 13.9 12.1 - 17.0 g/dL    HCT 40.8 36.6 - 50.3 %    MCV 85.5 80.0 - 99.0 FL    MCH 29.1 26.0 - 34.0 PG    MCHC 34.1 30.0 - 36.5 g/dL    RDW 13.0 11.5 - 14.5 %    PLATELET 621 834 - 539 K/uL    NEUTROPHILS 73 32 - 75 %    LYMPHOCYTES 21 12 - 49 %    MONOCYTES 5 5 - 13 %    EOSINOPHILS 1 0 - 7 %    BASOPHILS 0 0 - 1 %    ABS. NEUTROPHILS 7.3 1.8 - 8.0 K/UL    ABS. LYMPHOCYTES 2.1 0.8 - 3.5 K/UL    ABS. MONOCYTES 0.5 0.0 - 1.0 K/UL    ABS. EOSINOPHILS 0.1 0.0 - 0.4 K/UL    ABS. BASOPHILS 0.0 0.0 - 0.1 K/UL   METABOLIC PANEL, COMPREHENSIVE   Result Value Ref Range    Sodium 137 136 - 145 mmol/L    Potassium 3.5 3.5 - 5.1 mmol/L    Chloride 102 97 - 108 mmol/L    CO2 28 21 - 32 mmol/L    Anion gap 7 5 - 15 mmol/L    Glucose 94 65 - 100 mg/dL    BUN 9 6 - 20 MG/DL    Creatinine 1.09 0.70 - 1.30 MG/DL    BUN/Creatinine ratio 8 (L) 12 - 20      GFR est AA >60 >60 ml/min/1.73m2    GFR est non-AA >60 >60 ml/min/1.73m2    Calcium 8.8 8.5 - 10.1 MG/DL    Bilirubin, total 0.4 0.2 - 1.0 MG/DL    ALT (SGPT) 30 12 - 78 U/L    AST (SGOT) 20 15 - 37 U/L    Alk.  phosphatase 89 45 - 117 U/L    Protein, total 8.0 6.4 - 8.2 g/dL    Albumin 3.8 3.5 - 5.0 g/dL    Globulin 4.2 (H) 2.0 - 4.0 g/dL    A-G Ratio 0.9 (L) 1.1 - 2.2     URINALYSIS W/ RFLX MICROSCOPIC   Result Value Ref Range    Color YELLOW/STRAW      Appearance CLOUDY (A) CLEAR      Specific gravity 1.025 1.003 - 1.030      pH (UA) 6.5 5.0 - 8.0      Protein NEGATIVE  NEG mg/dL    Glucose NEGATIVE  NEG mg/dL    Ketone NEGATIVE  NEG mg/dL    Bilirubin NEGATIVE  NEG      Blood NEGATIVE  NEG      Urobilinogen 0.2 0.2 - 1.0 EU/dL    Nitrites NEGATIVE  NEG      Leukocyte Esterase NEGATIVE  NEG     TROPONIN I   Result Value Ref Range    Troponin-I, Qt. <0.04 <0.05 ng/mL   CK W/ REFLX CKMB   Result Value Ref Range     (H) 39 - 308 U/L   CK-MB,QUANT. Result Value Ref Range    CK - MB 1.0 <3.6 NG/ML    CK-MB Index 0.2 0 - 2.5     EKG, 12 LEAD, INITIAL   Result Value Ref Range    Ventricular Rate 65 BPM    Atrial Rate 65 BPM    P-R Interval 158 ms    QRS Duration 92 ms    Q-T Interval 380 ms    QTC Calculation (Bezet) 395 ms    Calculated P Axis 55 degrees    Calculated R Axis 22 degrees    Calculated T Axis 6 degrees    Diagnosis       Normal sinus rhythm  When compared with ECG of 03-OCT-2015 13:35,  ST no longer depressed in Lateral leads  T wave inversion less evident in Inferior leads         Orders Placed This Encounter    LEVETIRACETAM (KEPPRA)       1. Seizure [facility])        Follow-up Disposition:  Return in about 3 months (around 8/31/2017). EEG and doppler normal. Continue Keppra 1500 mg BID. He understands the necessity of sleeping well and taking the medication as prescribed. We will also order a keppra level to evaluate his level further as 1.1 is extremely low. Call or come in with changes. No driving 3 months post seizure.          This note will not be viewable in [facility]

## 2017-05-31 NOTE — ED TRIAGE NOTES
TRIAGE: Patient arrives from home via EMS for chest pain and headache. Patient states that his chest pain began around 1200 on 5.30.17. He states his headache began around 1400 on 5.30.17. He states this is the worst headache he has had in approximately 4 years. He did not eat dinner. He has an appointment later today with his neurologist for his seizures.

## 2017-05-31 NOTE — Clinical Note
Follow-up with regular doctor. Return for any new or worsening. Start taking Prilosec daily.  April C [facility]

## 2017-05-31 NOTE — PROCEDURES
This was an elective routine EEG for seizure concerns. Routine scalp leads were applied according to the 10-20 International system. EKG monitor as well. The basic rhythm averaged 8 Hz and modulated well with eye opening and closure. No evidence of focal slowing or spontaneous electrocerebral discharges. EKG grossly sinus rhythm with the exception of isolated PVCs? As the record proceeded there was uniform dampening of background activity at times consistent with patient drowsiness. Photic stim produced an entrained response at different harmonics. Hyperventilation evoked no changes. Technician notes movement above the patient at one point but no other commentary to patient behavior mannerisms vocalizations etc.    Impression: This is a normal awake partial drowsy recorded EEG as stipulated. Clinical correlation is advised.   MIROSLAVA MARKS.

## 2017-05-31 NOTE — PATIENT INSTRUCTIONS
[facility]   Statement to Patients  April 1, 2014      In an effort to ensure the large volume of patient prescription refills is processed in the most efficient and expeditious manner, we are asking our patients to assist us by calling your Pharmacy for all prescription refills, this will include also your  Mail Order Pharmacy. The pharmacy will contact our office electronically to continue the refill process. Please do not wait until the last minute to call your pharmacy. We need at least 48 hours (2days) to fill prescriptions. We also encourage you to call your pharmacy before going to  your prescription to make sure it is ready. With regard to controlled substance prescription refill requests (narcotic refills) that need to be picked up at our office, we ask your cooperation by providing us with at least 72 hours (3days) notice that you will need a refill. We will not refill narcotic prescription refill requests after 4:00pm on any weekday, Monday through Thursday, or after 2:00pm on Fridays, or on the weekends. We encourage everyone to explore another way of getting your prescription refill request processed using [employer], our patient web portal through our electronic medical record system. [employer] is an efficient and effective way to communicate your medication request directly to the office and  downloadable as an joceline on your smart phone . [employer] also features a review functionality that allows you to view your medication list as well as leave messages for your physician. Are you ready to get connected? If so please review the attatched instructions or speak to any of our staff to get you set up right away! Thank you so much for your cooperation. Should you have any questions please contact our Practice Administrator. The Physicians and Staff,  [facility] [id number] [address]  What is a living will?   A living will is a legal form you use to write down the kind of care you want at the end of your life. It is used by the health professionals who will treat you if you aren't able to decide for yourself. If you put your wishes in writing, your loved ones and others will know what kind of care you want. They won't need to guess. This can ease your mind and be helpful to others. A living will is not the same as an estate or property will. An estate will explains what you want to happen with your money and property after you die. Is a living will a legal document? A living will is a legal document. Each state has its own laws about living jimenez. If you move to another state, make sure that your living will is legal in the state where you now live. Or you might use a universal form that has been approved by many states. This kind of form can sometimes be completed and stored online. Your electronic copy will then be available wherever you have a connection to the Internet. In most cases, doctors will respect your wishes even if you have a form from a different state. · You don't need an  to complete a living will. But legal advice can be helpful if your state's laws are unclear, your health history is complicated, or your family can't agree on what should be in your living will. · You can change your living will at any time. Some people find that their wishes about end-of-life care change as their health changes. · In addition to making a living will, think about completing a medical power of  form. This form lets you name the person you want to make end-of-life treatment decisions for you (your \"health care agent\") if you're not able to. Many hospitals and nursing homes will give you the forms you need to complete a living will and a medical power of . · Your living will is used only if you can't make or communicate decisions for yourself anymore.  If you become able to make decisions again, you can accept or refuse any treatment, no matter what you wrote in your living will. · Your state may offer an online registry. This is a place where you can store your living will online so the doctors and nurses who need to treat you can find it right away. What should you think about when creating a living will? Talk about your end-of-life wishes with your family members and your doctor. Let them know what you want. That way the people making decisions for you won't be surprised by your choices. Think about these questions as you make your living will:  · Do you know enough about life support methods that might be used? If not, talk to your doctor so you know what might be done if you can't breathe on your own, your heart stops, or you're unable to swallow. · What things would you still want to be able to do after you receive life-support methods? Would you want to be able to walk? To speak? To eat on your own? To live without the help of machines? · If you have a choice, where do you want to be cared for? In your home? At a hospital or nursing home? · Do you want certain Mormon practices performed if you become very ill? · If you have a choice at the end of your life, where would you prefer to die? At home? In a hospital or nursing home? Somewhere else? · Would you prefer to be buried or cremated? · Do you want your organs to be donated after you die? What should you do with your living will? · Make sure that your family members and your health care agent have copies of your living will. · Give your doctor a copy of your living will to keep in your medical record. If you have more than one doctor, make sure that each one has a copy. · You may want to put a copy of your living will where it can be easily found. Where can you learn more? Go to http://samy-georges.info/. Enter R693 in the search box to learn more about \"Learning About Living Perelmo. \"  Current as of: February 24, 2016  Content Version: 11.2  © 1330-2885 [employer]. Care instructions adapted under license by [employer] (which disclaims liability or warranty for this information). If you have questions about a medical condition or this instruction, always ask your healthcare professional. [facility] any warranty or liability for your use of this information. Advance Directives: Care Instructions  Your Care Instructions  An advance directive is a legal way to state your wishes at the end of your life. It tells your family and your doctor what to do if you can no longer say what you want. There are two main types of advance directives. You can change them any time that your wishes change. · A living will tells your family and your doctor your wishes about life support and other treatment. · A durable power of  for health care lets you name a person to make treatment decisions for you when you can't speak for yourself. This person is called a health care agent. If you do not have an advance directive, decisions about your medical care may be made by a doctor or a  who doesn't know you. It may help to think of an advance directive as a gift to the people who care for you. If you have one, they won't have to make tough decisions by themselves. Follow-up care is a key part of your treatment and safety. Be sure to make and go to all appointments, and call your doctor if you are having problems. It's also a good idea to know your test results and keep a list of the medicines you take. How can you care for yourself at home? · Discuss your wishes with your loved ones and your doctor. This way, there are no surprises. · Many states have a unique form. Or you might use a universal form that has been approved by many states. This kind of form can sometimes be completed and stored online.  Your electronic copy will then be available wherever you have a connection to the Internet. In most cases, doctors will respect your wishes even if you have a form from a different state. · You don't need a  to do an advance directive. But you may want to get legal advice. · Think about these questions when you prepare an advance directive:  ¨ Who do you want to make decisions about your medical care if you are not able to? Many people choose a family member or close friend. ¨ Do you know enough about life support methods that might be used? If not, talk to your doctor so you understand. ¨ What are you most afraid of that might happen? You might be afraid of having pain, losing your independence, or being kept alive by machines. ¨ Where would you prefer to die? Choices include your home, a hospital, or a nursing home. ¨ Would you like to have information about hospice care to support you and your family? ¨ Do you want to donate organs when you die? ¨ Do you want certain Jewish practices performed before you die? If so, put your wishes in the advance directive. · Read your advance directive every year, and make changes as needed. When should you call for help? Be sure to contact your doctor if you have any questions. Where can you learn more? Go to http://samy-georges.info/. Enter R264 in the search box to learn more about \"Advance Directives: Care Instructions. \"  Current as of: November 17, 2016  Content Version: 11.2  © 3184-6632 [employer]. Care instructions adapted under license by [employer] (which disclaims liability or warranty for this information). If you have questions about a medical condition or this instruction, always ask your healthcare professional. [address] any warranty or liability for your use of this information.

## 2017-05-31 NOTE — DISCHARGE INSTRUCTIONS
We hope that we have addressed all of your medical concerns. The examination and treatment you received in the Emergency Department were for an emergent problem and were not intended as complete care. It is important that you follow up with your healthcare provider(s) for ongoing care. If your symptoms worsen or do not improve as expected, and you are unable to reach your usual health care provider(s), you should return to the Emergency Department. Today's healthcare is undergoing tremendous change, and patient satisfaction surveys are one of the many tools to assess the quality of medical care. You may receive a survey from the [employer] regarding your experience in the Emergency Department. I hope that your experience has been completely positive, particularly the medical care that I provided. As such, please participate in the survey; anything less than excellent does not meet my expectations or intentions. [facility] and [address] [place] [address] participate in nationally recognized quality of care measures. If your blood pressure is greater than 120/80, as reported below, we urge that you seek medical care to address the potential of high blood pressure, commonly known as hypertension. Hypertension can be hereditary or can be caused by certain medical conditions, pain, stress, or \"white coat syndrome. \"       Please make an appointment with your health care provider(s) for follow up of your Emergency Department visit. VITALS:   Patient Vitals for the past 8 hrs:   Temp Pulse Resp BP SpO2   05/31/17 0153 - - - (!) 146/96 -   05/31/17 0052 98 °F (36.7 °C) 60 18 (!) 166/114 97 %          Thank you for allowing us to provide you with medical care today. We realize that you have many choices for your emergency care needs. Please choose us in the future for any continued health care needs. Regards,           April MARIXA Mariee [address] [place] [address] [address]   Office: [phone]            Recent Results (from the past 24 hour(s))   EKG, 12 LEAD, INITIAL    Collection Time: 05/31/17 12:48 AM   Result Value Ref Range    Ventricular Rate 65 BPM    Atrial Rate 65 BPM    P-R Interval 158 ms    QRS Duration 92 ms    Q-T Interval 380 ms    QTC Calculation (Bezet) 395 ms    Calculated P Axis 55 degrees    Calculated R Axis 22 degrees    Calculated T Axis 6 degrees    Diagnosis       Normal sinus rhythm  When compared with ECG of 03-OCT-2015 13:35,  ST no longer depressed in Lateral leads  T wave inversion less evident in Inferior leads     CBC WITH AUTOMATED DIFF    Collection Time: 05/31/17  1:25 AM   Result Value Ref Range    WBC 10.0 4.1 - 11.1 K/uL    RBC 4.77 4.10 - 5.70 M/uL    HGB 13.9 12.1 - 17.0 g/dL    HCT 40.8 36.6 - 50.3 %    MCV 85.5 80.0 - 99.0 FL    MCH 29.1 26.0 - 34.0 PG    MCHC 34.1 30.0 - 36.5 g/dL    RDW 13.0 11.5 - 14.5 %    PLATELET 191 802 - 112 K/uL    NEUTROPHILS 73 32 - 75 %    LYMPHOCYTES 21 12 - 49 %    MONOCYTES 5 5 - 13 %    EOSINOPHILS 1 0 - 7 %    BASOPHILS 0 0 - 1 %    ABS. NEUTROPHILS 7.3 1.8 - 8.0 K/UL    ABS. LYMPHOCYTES 2.1 0.8 - 3.5 K/UL    ABS. MONOCYTES 0.5 0.0 - 1.0 K/UL    ABS. EOSINOPHILS 0.1 0.0 - 0.4 K/UL    ABS. BASOPHILS 0.0 0.0 - 0.1 K/UL   METABOLIC PANEL, COMPREHENSIVE    Collection Time: 05/31/17  1:25 AM   Result Value Ref Range    Sodium 137 136 - 145 mmol/L    Potassium 3.5 3.5 - 5.1 mmol/L    Chloride 102 97 - 108 mmol/L    CO2 28 21 - 32 mmol/L    Anion gap 7 5 - 15 mmol/L    Glucose 94 65 - 100 mg/dL    BUN 9 6 - 20 MG/DL    Creatinine 1.09 0.70 - 1.30 MG/DL    BUN/Creatinine ratio 8 (L) 12 - 20      GFR est AA >60 >60 ml/min/1.73m2    GFR est non-AA >60 >60 ml/min/1.73m2    Calcium 8.8 8.5 - 10.1 MG/DL    Bilirubin, total 0.4 0.2 - 1.0 MG/DL    ALT (SGPT) 30 12 - 78 U/L    AST (SGOT) 20 15 - 37 U/L    Alk.  phosphatase 89 45 - 117 U/L    Protein, total 8.0 6.4 - 8.2 g/dL Albumin 3.8 3.5 - 5.0 g/dL    Globulin 4.2 (H) 2.0 - 4.0 g/dL    A-G Ratio 0.9 (L) 1.1 - 2.2     URINALYSIS W/ RFLX MICROSCOPIC    Collection Time: 05/31/17  1:25 AM   Result Value Ref Range    Color YELLOW/STRAW      Appearance CLOUDY (A) CLEAR      Specific gravity 1.025 1.003 - 1.030      pH (UA) 6.5 5.0 - 8.0      Protein NEGATIVE  NEG mg/dL    Glucose NEGATIVE  NEG mg/dL    Ketone NEGATIVE  NEG mg/dL    Bilirubin NEGATIVE  NEG      Blood NEGATIVE  NEG      Urobilinogen 0.2 0.2 - 1.0 EU/dL    Nitrites NEGATIVE  NEG      Leukocyte Esterase NEGATIVE  NEG     TROPONIN I    Collection Time: 05/31/17  1:25 AM   Result Value Ref Range    Troponin-I, Qt. <0.04 <0.05 ng/mL   CK W/ REFLX CKMB    Collection Time: 05/31/17  1:25 AM   Result Value Ref Range     (H) 39 - 308 U/L   CK-MB,QUANT. Collection Time: 05/31/17  1:25 AM   Result Value Ref Range    CK - MB 1.0 <3.6 NG/ML    CK-MB Index 0.2 0 - 2.5         Xr Chest Pa Lat    Result Date: 5/31/2017  INDICATION: Chest pain since yesterday at noon. COMPARISON: October 3, 2015 FINDINGS: PA and lateral views of the chest demonstrate a stable cardiomediastinal silhouette and clear lungs bilaterally. The visualized osseous structures are unremarkable. IMPRESSION: No acute process. Ct Head Wo Cont    Result Date: 5/31/2017  EXAM:  CT HEAD WO CONT INDICATION:   headache since yesterday afternoon COMPARISON: None. TECHNIQUE: Unenhanced CT of the head was performed using 5 mm images. Brain and bone windows were generated. CT dose reduction was achieved through use of a standardized protocol tailored for this examination and automatic exposure control for dose modulation. FINDINGS: The ventricles and sulci are normal in size, shape and configuration and midline. There is no significant white matter disease. There is no intracranial hemorrhage, extra-axial collection, mass, mass effect or midline shift. The basilar cisterns are open.  No acute infarct is identified. The bone windows demonstrate no abnormalities. The visualized portions of the paranasal sinuses and mastoid air cells are clear. IMPRESSION: No acute intracranial process. Chest Pain: Care Instructions  Your Care Instructions  There are many things that can cause chest pain. Some are not serious and will get better on their own in a few days. But some kinds of chest pain need more testing and treatment. Your doctor may have recommended a follow-up visit in the next 8 to 12 hours. If you are not getting better, you may need more tests or treatment. Even though your doctor has released you, you still need to watch for any problems. The doctor carefully checked you, but sometimes problems can develop later. If you have new symptoms or if your symptoms do not get better, get medical care right away. If you have worse or different chest pain or pressure that lasts more than 5 minutes or you passed out (lost consciousness), call 911 or seek other emergency help right away. A medical visit is only one step in your treatment. Even if you feel better, you still need to do what your doctor recommends, such as going to all suggested follow-up appointments and taking medicines exactly as directed. This will help you recover and help prevent future problems. How can you care for yourself at home? · Rest until you feel better. · Take your medicine exactly as prescribed. Call your doctor if you think you are having a problem with your medicine. · Do not drive after taking a prescription pain medicine. When should you call for help? Call 911 if:  · You passed out (lost consciousness). · You have severe difficulty breathing. · You have symptoms of a heart attack. These may include:  ¨ Chest pain or pressure, or a strange feeling in your chest.  ¨ Sweating. ¨ Shortness of breath. ¨ Nausea or vomiting.   ¨ Pain, pressure, or a strange feeling in your back, neck, jaw, or upper belly or in one or both shoulders or arms. ¨ Lightheadedness or sudden weakness. ¨ A fast or irregular heartbeat. After you call 911, the  may tell you to chew 1 adult-strength or 2 to 4 low-dose aspirin. Wait for an ambulance. Do not try to drive yourself. Call your doctor today if:  · You have any trouble breathing. · Your chest pain gets worse. · You are dizzy or lightheaded, or you feel like you may faint. · You are not getting better as expected. · You are having new or different chest pain. Where can you learn more? Go to http://samyCafeMomgeorges.info/. Enter A120 in the search box to learn more about \"Chest Pain: Care Instructions. \"  Current as of: May 27, 2016  Content Version: 11.2  © 6902-6156 [email or web address]. Care instructions adapted under license by [employer] (which disclaims liability or warranty for this information). If you have questions about a medical condition or this instruction, always ask your healthcare professional. [place] [address] any warranty or liability for your use of this information. Headache: Care Instructions  Your Care Instructions    Headaches have many possible causes. Most headaches aren't a sign of a more serious problem, and they will get better on their own. Home treatment may help you feel better faster. The doctor has checked you carefully, but problems can develop later. If you notice any problems or new symptoms, get medical treatment right away. Follow-up care is a key part of your treatment and safety. Be sure to make and go to all appointments, and call your doctor if you are having problems. It's also a good idea to know your test results and keep a list of the medicines you take. How can you care for yourself at home? · Do not drive if you have taken a prescription pain medicine. · Rest in a quiet, dark room until your headache is gone. Close your eyes and try to relax or go to sleep.  Don't watch TV or read. · Put a cold, moist cloth or cold pack on the painful area for 10 to 20 minutes at a time. Put a thin cloth between the cold pack and your skin. · Use a warm, moist towel or a heating pad set on low to relax tight shoulder and neck muscles. · Have someone gently massage your neck and shoulders. · Take pain medicines exactly as directed. ¨ If the doctor gave you a prescription medicine for pain, take it as prescribed. ¨ If you are not taking a prescription pain medicine, ask your doctor if you can take an over-the-counter medicine. · Be careful not to take pain medicine more often than the instructions allow, because you may get worse or more frequent headaches when the medicine wears off. · Do not ignore new symptoms that occur with a headache, such as a fever, weakness or numbness, vision changes, or confusion. These may be signs of a more serious problem. To prevent headaches  · Keep a headache diary so you can figure out what triggers your headaches. Avoiding triggers may help you prevent headaches. Record when each headache began, how long it lasted, and what the pain was like (throbbing, aching, stabbing, or dull). Write down any other symptoms you had with the headache, such as nausea, flashing lights or dark spots, or sensitivity to bright light or loud noise. Note if the headache occurred near your period. List anything that might have triggered the headache, such as certain foods (chocolate, cheese, wine) or odors, smoke, bright light, stress, or lack of sleep. · Find healthy ways to deal with stress. Headaches are most common during or right after stressful times. Take time to relax before and after you do something that has caused a headache in the past.  · Try to keep your muscles relaxed by keeping good posture. Check your jaw, face, neck, and shoulder muscles for tension, and try relaxing them.  When sitting at a desk, change positions often, and stretch for 30 seconds each hour.  · Get plenty of sleep and exercise. · Eat regularly and well. Long periods without food can trigger a headache. · Treat yourself to a massage. Some people find that regular massages are very helpful in relieving tension. · Limit caffeine by not drinking too much coffee, tea, or soda. But don't quit caffeine suddenly, because that can also give you headaches. · Reduce eyestrain from computers by blinking frequently and looking away from the computer screen every so often. Make sure you have proper eyewear and that your monitor is set up properly, about an arm's length away. · Seek help if you have depression or anxiety. Your headaches may be linked to these conditions. Treatment can both prevent headaches and help with symptoms of anxiety or depression. When should you call for help? Call 911 anytime you think you may need emergency care. For example, call if:  · You have signs of a stroke. These may include:  ¨ Sudden numbness, paralysis, or weakness in your face, arm, or leg, especially on only one side of your body. ¨ Sudden vision changes. ¨ Sudden trouble speaking. ¨ Sudden confusion or trouble understanding simple statements. ¨ Sudden problems with walking or balance. ¨ A sudden, severe headache that is different from past headaches. Call your doctor now or seek immediate medical care if:  · You have a new or worse headache. · Your headache gets much worse. Where can you learn more? Go to http://samy-georges.info/. Enter M271 in the search box to learn more about \"Headache: Care Instructions. \"  Current as of: October 14, 2016  Content Version: 11.2  © 0938-1317 [employer]. Care instructions adapted under license by [employer] (which disclaims liability or warranty for this information).  If you have questions about a medical condition or this instruction, always ask your healthcare professional. Anamaria Zuñiga disclaims any warranty or liability for your use of this information.

## 2017-06-07 ENCOUNTER — PATIENT OUTREACH (OUTPATIENT)
Dept: FAMILY MEDICINE CLINIC | Age: 32
End: 2017-06-07

## 2017-06-07 NOTE — PROGRESS NOTES
Resolving current episode. Transitions of care complete. Per EMR review, there have been 1 other  ED visit within 30 days post discharge. Patient has attended  follow-up appointments as directed on 5/17 & 5/31 with neurologist.  There has been no outreach from patient to this writer.

## 2017-06-12 ENCOUNTER — TELEPHONE (OUTPATIENT)
Dept: FAMILY MEDICINE CLINIC | Age: 32
End: 2017-06-12

## 2017-06-12 NOTE — TELEPHONE ENCOUNTER
Patient is calling, patient initially wanted to make an appointment with SAUNDRA Miranda, advised patient of SAUNDRA Cuellar next available appointment (7/10/2017), patient states that this is to far out for him, patient states that he believes that he may have sprained his pinky, he states that it is swollen, he has put ice on it, but it still hurts and he would like to have it looked at, and diagnosed, patient states that he also went to the ED (did not name hospital) a couple of days ago and his BP was 160/100 and he was advised to speak with his PCP about this, and patient also is requesting that SAUNDRA Miranda sign paperwork for him for his insurance (hard to understand about what exact paperwork, but he did state it was about his [place] plan). Advised patient I did not have the authority to open spots before this date, patient is requesting that he be fit in to the NP schedule as he is very concerned about his health. Advised patient I would send back a message the nurse would be contacting him as soon as possible.      Best call back # for patient: [phone]

## 2017-06-19 ENCOUNTER — OFFICE VISIT (OUTPATIENT)
Dept: FAMILY MEDICINE CLINIC | Age: 32
End: 2017-06-19

## 2017-06-19 VITALS
WEIGHT: 221.9 LBS | RESPIRATION RATE: 16 BRPM | SYSTOLIC BLOOD PRESSURE: 120 MMHG | DIASTOLIC BLOOD PRESSURE: 90 MMHG | BODY MASS INDEX: 32.87 KG/M2 | TEMPERATURE: 98.1 F | HEART RATE: 66 BPM | HEIGHT: 69 IN | OXYGEN SATURATION: 99 %

## 2017-06-19 DIAGNOSIS — I10 ESSENTIAL HYPERTENSION: Primary | ICD-10-CM

## 2017-06-19 DIAGNOSIS — K21.9 GASTROESOPHAGEAL REFLUX DISEASE WITHOUT ESOPHAGITIS: ICD-10-CM

## 2017-06-19 DIAGNOSIS — R56.9 SEIZURE (HCC): ICD-10-CM

## 2017-06-19 DIAGNOSIS — S63.616A SPRAIN OF RIGHT LITTLE FINGER, UNSPECIFIED SITE OF FINGER, INITIAL ENCOUNTER: ICD-10-CM

## 2017-06-19 RX ORDER — FAMOTIDINE 20 MG/1
20 TABLET, FILM COATED ORAL 2 TIMES DAILY
Qty: 60 TAB | Refills: 5 | Status: SHIPPED | OUTPATIENT
Start: 2017-06-19

## 2017-06-19 RX ORDER — LOSARTAN POTASSIUM AND HYDROCHLOROTHIAZIDE 12.5; 5 MG/1; MG/1
1 TABLET ORAL DAILY
Qty: 30 TAB | Refills: 5 | Status: SHIPPED | OUTPATIENT
Start: 2017-06-19 | End: 2018-04-11 | Stop reason: SDUPTHER
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
## 2017-06-19 NOTE — MR AVS SNAPSHOT
Visit Information Date & Time Provider Department Dept. Phone Encounter #  
 6/19/2017 10:00 AM Dafne Hardin, [address] [phone] [id number] Follow-up Instructions Return for 1-2 weeks bp recheck . Your Appointments 8/30/2017  1:30 PM  
Follow Up with Caroline Larios NP [address] [facility] ([facility]) Appt Note: seizure [place] [address] [facility] [address] [facility] [address] [phone]  
  
   
 [place] [address] [address] [address] Upcoming Health Maintenance Date Due DTaP/Tdap/Td series (2 - Td) 1/10/2015 INFLUENZA AGE 9 TO ADULT 8/1/2017 Allergies as of 6/19/2017  Review Complete On: 6/19/2017 By: Dafne Hardin NP Severity Noted Reaction Type Reactions Amlodipine  06/07/2016    Swelling Current Immunizations  Never Reviewed Name Date Influenza Vaccine 9/16/2014 Tdap 1/10/2005 Not reviewed this visit You Were Diagnosed With   
  
 Codes Comments Sprain of right little finger, unspecified site of finger, initial encounter    -  Primary ICD-10-CM: B90.918Y 
ICD-9-CM: 842.10 Seizure ([address]     ICD-10-CM: R56.9 ICD-9-CM: 780.39 Essential hypertension     ICD-10-CM: I10 
ICD-9-CM: 401.9 Gastroesophageal reflux disease without esophagitis     ICD-10-CM: K21.9 ICD-9-CM: 530.81 Vitals BP Pulse Temp Resp Height(growth percentile) Weight(growth percentile) 120/90 66 98.1 °F (36.7 °C) (Oral) 16 5' 9\" (1.753 m) 221 lb 14.4 oz (100.7 kg) SpO2 BMI Smoking Status 99% 32.77 kg/m2 Never Smoker Vitals History BMI and BSA Data Body Mass Index Body Surface Area 32.77 kg/m 2 2.21 m 2 Preferred Pharmacy Pharmacy Name Phone [id number] [phone] Your Updated Medication List  
  
   
 This list is accurate as of: 6/19/17 11:17 AM.  Always use your most recent med list.  
  
  
  
  
 famotidine 20 mg tablet Commonly known as:  PEPCID Take 1 Tab by mouth two (2) times a day. levETIRAcetam 500 mg tablet Commonly known as:  KEPPRA Take 3 tablets by mouth in the AM and 3 tablets by mouth in the PM  
  
 losartan-hydroCHLOROthiazide 50-12.5 mg per tablet Commonly known as:  HYZAAR Take 1 Tab by mouth daily. Dose adjustment Prescriptions Sent to Pharmacy Refills  
 losartan-hydroCHLOROthiazide (HYZAAR) 50-12.5 mg per tablet 5 Sig: Take 1 Tab by mouth daily. Dose adjustment Class: Normal  
 Pharmacy: [address] [place] [address] Ph #: [phone] Route: Oral  
 famotidine (PEPCID) 20 mg tablet 5 Sig: Take 1 Tab by mouth two (2) times a day. Class: Normal  
 Pharmacy: [address] [place] [address] Ph #: [phone] Route: Oral  
  
Follow-up Instructions Return for 1-2 weeks bp recheck . To-Do List   
 06/19/2017 11:20 AM  
  Appointment with [facility] RAD XR RM 1 at [address] [place] [address] ([phone]) Introducing [facility] SERVICES! Valarie Sung introduces [employer] On[employer] patient portal. Now you can access parts of your medical record, email your doctor's office, and request medication refills online. 1. In your internet browser, go to [email or web address] Travell[employer] 2. Click on the First Time User? Click Here link in the Sign In box. You will see the New Member Sign Up page. 3. Enter your [employer] Access Code exactly as it appears below. You will not need to use this code after youve completed the sign-up process. If you do not sign up before the expiration date, you must request a new code. · [employer] Access Code: [email or web address] Expires: 6/24/2017  2:55 PM 
 
4.  Enter the last four digits of your Social Security Number (xxxx) and Date of Birth (mm/dd/yyyy) as indicated and click Submit. You will be taken to the next sign-up page. 5. Create a InVitae ID. This will be your InVitae login ID and cannot be changed, so think of one that is secure and easy to remember. 6. Create a InVitae password. You can change your password at any time. 7. Enter your Password Reset Question and Answer. This can be used at a later time if you forget your password. 8. Enter your e-mail address. You will receive e-mail notification when new information is available in [address] 9. Click Sign Up. You can now view and download portions of your medical record. 10. Click the Download Summary menu link to download a portable copy of your medical information. If you have questions, please visit the Frequently Asked Questions section of the InVitae website. Remember, InVitae is NOT to be used for urgent needs. For medical emergencies, dial 911. Now available from your iPhone and Android! Please provide this summary of care documentation to your next provider. Your primary care clinician is listed as Maxi Pang. If you have any questions after today's visit, please call [phone].
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
## 2017-07-17 ENCOUNTER — OFFICE VISIT (OUTPATIENT)
Dept: FAMILY MEDICINE CLINIC | Age: 32
End: 2017-07-17

## 2017-07-17 VITALS
HEIGHT: 69 IN | OXYGEN SATURATION: 96 % | SYSTOLIC BLOOD PRESSURE: 126 MMHG | HEART RATE: 64 BPM | TEMPERATURE: 98.6 F | WEIGHT: 221 LBS | DIASTOLIC BLOOD PRESSURE: 86 MMHG | RESPIRATION RATE: 16 BRPM | BODY MASS INDEX: 32.73 KG/M2

## 2017-07-17 DIAGNOSIS — I10 ESSENTIAL HYPERTENSION: Primary | ICD-10-CM

## 2017-07-17 NOTE — MR AVS SNAPSHOT
Visit Information Date & Time Provider Department Dept. Phone Encounter #  
 7/17/2017 11:20 AM Alok Ledbetter, [address] [facility] [phone] [id number] Follow-up Instructions Return in about 6 months (around 1/17/2018) for routine physical . Your Appointments 8/30/2017  1:30 PM  
Follow Up with Zee Guzman NP [address] [facility] ([facility]) Appt Note: seizure [place] [address] [facility] [address] [address] [phone]  
  
   
 [place] [address] [address] [address] Upcoming Health Maintenance Date Due DTaP/Tdap/Td series (2 - Td) 1/10/2015 INFLUENZA AGE 9 TO ADULT 8/1/2017 Allergies as of 7/17/2017  Review Complete On: 7/17/2017 By: Alok Ledbetter NP Severity Noted Reaction Type Reactions Amlodipine  06/07/2016    Swelling Current Immunizations  Never Reviewed Name Date Influenza Vaccine 9/16/2014 Tdap 1/10/2005 Not reviewed this visit You Were Diagnosed With   
  
 Codes Comments Essential hypertension    -  Primary ICD-10-CM: I10 
ICD-9-CM: 401.9 Vitals BP Pulse Temp Resp Height(growth percentile) Weight(growth percentile) 126/86 64 98.6 °F (37 °C) (Oral) 16 5' 9\" (1.753 m) 221 lb (100.2 kg) SpO2 BMI Smoking Status 96% 32.64 kg/m2 Never Smoker Vitals History BMI and BSA Data Body Mass Index Body Surface Area  
 32.64 kg/m 2 2.21 m 2 Preferred Pharmacy Pharmacy Name Phone [id number] [phone] Your Updated Medication List  
  
   
This list is accurate as of: 7/17/17 12:07 PM.  Always use your most recent med list.  
  
  
  
  
 famotidine 20 mg tablet Commonly known as:  PEPCID Take 1 Tab by mouth two (2) times a day. levETIRAcetam 500 mg tablet Commonly known as:  KEPPRA Take 3 tablets by mouth in the AM and 3 tablets by mouth in the PM  
  
 losartan-hydroCHLOROthiazide 50-12.5 mg per tablet Commonly known as:  HYZAAR Take 1 Tab by mouth daily. Dose adjustment We Performed the Following METABOLIC PANEL, BASIC [50428 CPT(R)] Follow-up Instructions Return in about 6 months (around 1/17/2018) for routine physical . Introducing hospitals & HEALTH SERVICES! [employer] introduces P[employer]Press patient portal. Now you can access parts of your medical record, email your doctor's office, and request medication refills online. 1. In your internet browser, go to [email or web address] [email or web address]/Movero Technology 2. Click on the First Time User? Click Here link in the Sign In box. You will see the New Member Sign Up page. 3. Enter your PlexPress Access Code exactly as it appears below. You will not need to use this code after youve completed the sign-up process. If you do not sign up before the expiration date, you must request a new code. · PlexPress Access Code: [email or web address] Expires: 10/15/2017 11:48 AM 
 
4. Enter the last four digits of your Social Security Number (xxxx) and Date of Birth (mm/dd/yyyy) as indicated and click Submit. You will be taken to the next sign-up page. 5. Create a PlexPress ID. This will be your PlexPress login ID and cannot be changed, so think of one that is secure and easy to remember. 6. Create a PlexPress password. You can change your password at any time. 7. Enter your Password Reset Question and Answer. This can be used at a later time if you forget your password. 8. Enter your e-mail address. You will receive e-mail notification when new information is available in [address] 9. Click Sign Up. You can now view and download portions of your medical record. 10. Click the Download Summary menu link to download a portable copy of your medical information.  
 
If you have questions, please visit the Frequently Asked Questions section of the [employer]. Remember, [email or web address] is NOT to be used for urgent needs. For medical emergencies, dial 911. Now available from your iPhone and Android! Please provide this summary of care documentation to your next provider. Your primary care clinician is listed as Renuka Torres. If you have any questions after today's visit, please call [phone].

## 2017-07-17 NOTE — PROGRESS NOTES
Pt presents to office today for a 2 week follow up. Chief Complaint   Patient presents with    Blood Pressure Check     2 week follow up. 1. Have you been to the ER, urgent care clinic since your last visit? Hospitalized since your last visit? No    2. Have you seen or consulted any other health care providers outside of the [address] [place] [address] since your last visit? Include any pap smears or colon screening.  No

## 2017-07-18 LAB
BUN SERPL-MCNC: 14 MG/DL (ref 6–20)
BUN/CREAT SERPL: 12 (ref 9–20)
CALCIUM SERPL-MCNC: 9.1 MG/DL (ref 8.7–10.2)
CHLORIDE SERPL-SCNC: 100 MMOL/L (ref 96–106)
CO2 SERPL-SCNC: 22 MMOL/L (ref 18–29)
CREAT SERPL-MCNC: 1.21 MG/DL (ref 0.76–1.27)
GLUCOSE SERPL-MCNC: 83 MG/DL (ref 65–99)
POTASSIUM SERPL-SCNC: 4.1 MMOL/L (ref 3.5–5.2)
SODIUM SERPL-SCNC: 140 MMOL/L (ref 134–144)

## 2017-07-18 NOTE — PROGRESS NOTES
Attempted to call pt today with his lab results, unsuccessful, left him a message to call office back.

## 2017-08-07 ENCOUNTER — HOSPITAL ENCOUNTER (EMERGENCY)
Age: 32
Discharge: HOME | End: 2017-08-07
Attending: EMERGENCY MEDICINE
Payer: MEDICAID

## 2017-08-07 VITALS
OXYGEN SATURATION: 100 % | DIASTOLIC BLOOD PRESSURE: 80 MMHG | RESPIRATION RATE: 22 BRPM | TEMPERATURE: 98.1 F | WEIGHT: 220 LBS | HEART RATE: 61 BPM | HEIGHT: 69 IN | SYSTOLIC BLOOD PRESSURE: 141 MMHG | BODY MASS INDEX: 32.58 KG/M2

## 2017-08-07 DIAGNOSIS — R53.1 GENERALIZED WEAKNESS: ICD-10-CM

## 2017-08-07 DIAGNOSIS — E87.6 HYPOKALEMIA: Primary | ICD-10-CM

## 2017-08-07 LAB
AMPHET UR QL SCN: NEGATIVE
ANION GAP BLD CALC-SCNC: 15 MMOL/L (ref 3–18)
APAP SERPL-MCNC: <2 UG/ML (ref 10–30)
APPEARANCE UR: CLEAR
BACTERIA URNS QL MICRO: NEGATIVE /HPF
BARBITURATES UR QL SCN: NEGATIVE
BASOPHILS # BLD AUTO: 0 K/UL (ref 0–0.06)
BASOPHILS # BLD: 0 % (ref 0–2)
BENZODIAZ UR QL: NEGATIVE
BILIRUB UR QL: NEGATIVE
BUN SERPL-MCNC: 11 MG/DL (ref 7–18)
BUN/CREAT SERPL: 9 (ref 12–20)
CALCIUM SERPL-MCNC: 8.8 MG/DL (ref 8.5–10.1)
CANNABINOIDS UR QL SCN: NEGATIVE
CHLORIDE SERPL-SCNC: 104 MMOL/L (ref 100–108)
CO2 SERPL-SCNC: 24 MMOL/L (ref 21–32)
COCAINE UR QL SCN: NEGATIVE
COLOR UR: YELLOW
CREAT SERPL-MCNC: 1.26 MG/DL (ref 0.6–1.3)
DIFFERENTIAL METHOD BLD: ABNORMAL
EOSINOPHIL # BLD: 0.1 K/UL (ref 0–0.4)
EOSINOPHIL NFR BLD: 1 % (ref 0–5)
EPITH CASTS URNS QL MICRO: NORMAL /LPF (ref 0–5)
ERYTHROCYTE [DISTWIDTH] IN BLOOD BY AUTOMATED COUNT: 12.9 % (ref 11.6–14.5)
GLUCOSE SERPL-MCNC: 116 MG/DL (ref 74–99)
GLUCOSE UR STRIP.AUTO-MCNC: NEGATIVE MG/DL
HCT VFR BLD AUTO: 41.4 % (ref 36–48)
HDSCOM,HDSCOM: NORMAL
HGB BLD-MCNC: 14.6 G/DL (ref 13–16)
HGB UR QL STRIP: NEGATIVE
KETONES UR QL STRIP.AUTO: ABNORMAL MG/DL
LEUKOCYTE ESTERASE UR QL STRIP.AUTO: NEGATIVE
LYMPHOCYTES # BLD AUTO: 19 % (ref 21–52)
LYMPHOCYTES # BLD: 2.1 K/UL (ref 0.9–3.6)
MAGNESIUM SERPL-MCNC: 2 MG/DL (ref 1.6–2.6)
MCH RBC QN AUTO: 29.6 PG (ref 24–34)
MCHC RBC AUTO-ENTMCNC: 35.3 G/DL (ref 31–37)
MCV RBC AUTO: 84 FL (ref 74–97)
METHADONE UR QL: NEGATIVE
MONOCYTES # BLD: 0.5 K/UL (ref 0.05–1.2)
MONOCYTES NFR BLD AUTO: 4 % (ref 3–10)
NEUTS SEG # BLD: 8.4 K/UL (ref 1.8–8)
NEUTS SEG NFR BLD AUTO: 76 % (ref 40–73)
NITRITE UR QL STRIP.AUTO: NEGATIVE
OPIATES UR QL: NEGATIVE
PCP UR QL: NEGATIVE
PH UR STRIP: 5.5 [PH] (ref 5–8)
PLATELET # BLD AUTO: 270 K/UL (ref 135–420)
PMV BLD AUTO: 10.3 FL (ref 9.2–11.8)
POTASSIUM SERPL-SCNC: 2.4 MMOL/L (ref 3.5–5.5)
PROT UR STRIP-MCNC: 30 MG/DL
RBC # BLD AUTO: 4.93 M/UL (ref 4.7–5.5)
RBC #/AREA URNS HPF: NORMAL /HPF (ref 0–5)
SALICYLATES SERPL-MCNC: <2.8 MG/DL (ref 2.8–20)
SODIUM SERPL-SCNC: 143 MMOL/L (ref 136–145)
SP GR UR REFRACTOMETRY: 1.02 (ref 1–1.03)
UROBILINOGEN UR QL STRIP.AUTO: 0.2 EU/DL (ref 0.2–1)
WBC # BLD AUTO: 11.2 K/UL (ref 4.6–13.2)
WBC URNS QL MICRO: NORMAL /HPF (ref 0–5)

## 2017-08-07 PROCEDURE — 81001 URINALYSIS AUTO W/SCOPE: CPT | Performed by: EMERGENCY MEDICINE

## 2017-08-07 PROCEDURE — 99285 EMERGENCY DEPT VISIT HI MDM: CPT

## 2017-08-07 PROCEDURE — 80307 DRUG TEST PRSMV CHEM ANLYZR: CPT | Performed by: EMERGENCY MEDICINE

## 2017-08-07 PROCEDURE — 85025 COMPLETE CBC W/AUTO DIFF WBC: CPT | Performed by: EMERGENCY MEDICINE

## 2017-08-07 PROCEDURE — 93005 ELECTROCARDIOGRAM TRACING: CPT

## 2017-08-07 PROCEDURE — 83735 ASSAY OF MAGNESIUM: CPT | Performed by: EMERGENCY MEDICINE

## 2017-08-07 PROCEDURE — 80048 BASIC METABOLIC PNL TOTAL CA: CPT | Performed by: EMERGENCY MEDICINE

## 2017-08-07 RX ORDER — POTASSIUM CHLORIDE 20 MEQ/1
20 TABLET, EXTENDED RELEASE ORAL 3 TIMES DAILY
Qty: 9 TAB | Refills: 0 | Status: SHIPPED | OUTPATIENT
Start: 2017-08-07 | End: 2017-08-07

## 2017-08-07 RX ORDER — SODIUM CHLORIDE 0.9 % (FLUSH) 0.9 %
5-10 SYRINGE (ML) INJECTION EVERY 8 HOURS
Status: DISCONTINUED | OUTPATIENT
Start: 2017-08-07 | End: 2017-08-07 | Stop reason: HOSPADM

## 2017-08-07 RX ORDER — POTASSIUM CHLORIDE 20 MEQ/1
20 TABLET, EXTENDED RELEASE ORAL 3 TIMES DAILY
Qty: 9 TAB | Refills: 0 | Status: SHIPPED | OUTPATIENT
Start: 2017-08-07 | End: 2017-08-10

## 2017-08-07 RX ORDER — SODIUM CHLORIDE 0.9 % (FLUSH) 0.9 %
5-10 SYRINGE (ML) INJECTION AS NEEDED
Status: DISCONTINUED | OUTPATIENT
Start: 2017-08-07 | End: 2017-08-07 | Stop reason: HOSPADM

## 2017-08-07 NOTE — ED PROVIDER NOTES
HPI Comments: 3:22 AM    Justice Huerta is a 33 y.o. male with pertinent PMHx of seizures, HTN and PNA presenting via EMS to the ED c/o dry cough and generalized myalgias x 2 days. Pt notes  An associated symptom of headache. Pt states that he has used Tylenol, with no relief. Pt denies any sick contacts with similar sxs. Pt notes a hx of similar sxs, but does not expound. Pt denies any recent use of narcotics, but notes that he took 2 Tylenol PM last night at ~1845 last night. Pt specifically denies any N/V/D, vision changes, abdominal pain, urinary sxs or seizures. PCP: Melodie Vaca NP  Social Hx: - tobacco use, - alcohol use, - illicit drug use    There are no other complaints, changes, or physical findings at this time. The history is provided by the patient. No  was used. Past Medical History:   Diagnosis Date    Essential hypertension 4/1/2016    Hypertension     Pneumonia Tania 10, 2014    Seizures [facility])        History reviewed. No pertinent surgical history. Family History:   Problem Relation Age of Onset    Hypertension Mother     Heart Disease Father     Hypertension Father     Seizures Daughter        Social History     Social History    Marital status: SINGLE     Spouse name: N/A    Number of children: N/A    Years of education: N/A     Occupational History    Not on file. Social History Main Topics    Smoking status: Never Smoker    Smokeless tobacco: Never Used    Alcohol use No    Drug use: No    Sexual activity: Yes     Partners: Female     Birth control/ protection: Pill     Other Topics Concern    Not on file     Social History Narrative         ALLERGIES: Amlodipine    Review of Systems   Eyes: Negative for visual disturbance. Respiratory: Positive for cough (two days). Gastrointestinal: Negative for abdominal pain, diarrhea, nausea and vomiting. Genitourinary: Negative. Musculoskeletal: Positive for myalgias (generalized). Neurological: Positive for headaches. Negative for seizures. All other systems reviewed and are negative. Vitals:    08/07/17 0317 08/07/17 0330 08/07/17 0345   BP: 139/88 (!) 143/94    Pulse: 80  71   Resp: 20 17 13   Temp: 98 °F (36.7 °C)     SpO2: 99% 99% 100%   Weight: 99.8 kg (220 lb)     Height: 5' 9\" (1.753 m)              Physical Exam   Constitutional:   Middle aged male. Inappropriately sleepy. Arouses to loud voice and light touch. HENT:   Head: Normocephalic. Right Ear: External ear normal.   Left Ear: External ear normal.   Nose: Nose normal.   Mouth/Throat: Oropharynx is clear and moist. Mucous membranes are dry. Eyes:   Pupils are 4 mm   Neck: Normal range of motion. Neck supple. No JVD present. No tracheal deviation present. No thyromegaly present. Cardiovascular: Normal rate, regular rhythm, normal heart sounds and intact distal pulses. Exam reveals no gallop and no friction rub. No murmur heard. Pulmonary/Chest: Effort normal and breath sounds normal. No respiratory distress. He has no wheezes. He has no rales. He exhibits no tenderness. Abdominal: Soft. Bowel sounds are normal. He exhibits no distension and no mass. There is no tenderness. No HSM   Musculoskeletal: Normal range of motion. He exhibits no edema or tenderness. Lymphadenopathy:     He has no cervical adenopathy. Neurological: He is alert. GCS eye subscore is 3. GCS verbal subscore is 4. GCS motor subscore is 6. Sleepy  Fine motor coordination is slightly diminished in FTN coordination. Reflexes are diminished but present. Patella and achilles, without clonus bilaterally. Skin: Skin is warm and dry. Moderate to severe eczema, covering most area of torso, RUE, and both lower extremities with some sparing of the left forearm and hand. No redness or notable injection sites. Psychiatric:   Too sleepy to assess. Often we would have to ask the same questions 2-3 times before he wound answer.    He normally answers yes or no and never volunteers information      Nursing note and vitals reviewed. RESULTS:    CARDIAC MONITOR NOTE:  Cardiac Rhythm: NSR  Rate: 80 bpm     PULSE OXIMETRY NOTE:  Pulse-ox is 99% on RA  Interpretation: NML       EKG interpretation: (Preliminary) at 0346  Rhythm: normal sinus rhythm; and regular . Rate (approx.): minimal voltage criteria for LVH; nml ST segments. Written by CARINE Whalen, as dictated by Criselda Mills MD.      No orders to display        Labs Reviewed   CBC WITH AUTOMATED DIFF - Abnormal; Notable for the following:        Result Value    NEUTROPHILS 76 (*)     LYMPHOCYTES 19 (*)     ABS.  NEUTROPHILS 8.4 (*)     All other components within normal limits   METABOLIC PANEL, BASIC - Abnormal; Notable for the following:     Potassium 2.4 (*)     Glucose 116 (*)     BUN/Creatinine ratio 9 (*)     All other components within normal limits   URINALYSIS W/ RFLX MICROSCOPIC - Abnormal; Notable for the following:     Protein 30 (*)     Ketone TRACE (*)     All other components within normal limits   ACETAMINOPHEN - Abnormal; Notable for the following:     Acetaminophen level <2 (*)     All other components within normal limits   SALICYLATE - Abnormal; Notable for the following:     SALICYLATE <1.3 (*)     All other components within normal limits   MAGNESIUM   DRUG SCREEN, URINE   URINE MICROSCOPIC ONLY       Recent Results (from the past 12 hour(s))   URINALYSIS W/ RFLX MICROSCOPIC    Collection Time: 08/07/17  3:45 AM   Result Value Ref Range    Color YELLOW      Appearance CLEAR      Specific gravity 1.018 1.005 - 1.030      pH (UA) 5.5 5.0 - 8.0      Protein 30 (A) NEG mg/dL    Glucose NEGATIVE  NEG mg/dL    Ketone TRACE (A) NEG mg/dL    Bilirubin NEGATIVE  NEG      Blood NEGATIVE  NEG      Urobilinogen 0.2 0.2 - 1.0 EU/dL    Nitrites NEGATIVE  NEG      Leukocyte Esterase NEGATIVE  NEG     DRUG SCREEN, URINE    Collection Time: 08/07/17  3:45 AM   Result Value Ref Range    BENZODIAZEPINE NEGATIVE  NEG      BARBITURATES NEGATIVE  NEG      THC (TH-CANNABINOL) NEGATIVE  NEG      OPIATES NEGATIVE  NEG      PCP(PHENCYCLIDINE) NEGATIVE  NEG      COCAINE NEGATIVE  NEG      AMPHETAMINES NEGATIVE  NEG      METHADONE NEGATIVE  NEG      HDSCOM (NOTE)    URINE MICROSCOPIC ONLY    Collection Time: 08/07/17  3:45 AM   Result Value Ref Range    WBC 0 to 1 0 - 5 /hpf    RBC 1 to 2 0 - 5 /hpf    Epithelial cells 0 to 1 0 - 5 /lpf    Bacteria NEGATIVE  NEG /hpf   EKG, 12 LEAD, INITIAL    Collection Time: 08/07/17  3:46 AM   Result Value Ref Range    Ventricular Rate 76 BPM    Atrial Rate 76 BPM    P-R Interval 152 ms    QRS Duration 104 ms    Q-T Interval 380 ms    QTC Calculation (Bezet) 427 ms    Calculated P Axis 50 degrees    Calculated R Axis 20 degrees    Calculated T Axis 5 degrees    Diagnosis       Normal sinus rhythm  Minimal voltage criteria for LVH, may be normal variant  Borderline ECG  No previous ECGs available     CBC WITH AUTOMATED DIFF    Collection Time: 08/07/17  4:00 AM   Result Value Ref Range    WBC 11.2 4.6 - 13.2 K/uL    RBC 4.93 4.70 - 5.50 M/uL    HGB 14.6 13.0 - 16.0 g/dL    HCT 41.4 36.0 - 48.0 %    MCV 84.0 74.0 - 97.0 FL    MCH 29.6 24.0 - 34.0 PG    MCHC 35.3 31.0 - 37.0 g/dL    RDW 12.9 11.6 - 14.5 %    PLATELET 005 091 - 265 K/uL    MPV 10.3 9.2 - 11.8 FL    NEUTROPHILS 76 (H) 40 - 73 %    LYMPHOCYTES 19 (L) 21 - 52 %    MONOCYTES 4 3 - 10 %    EOSINOPHILS 1 0 - 5 %    BASOPHILS 0 0 - 2 %    ABS. NEUTROPHILS 8.4 (H) 1.8 - 8.0 K/UL    ABS. LYMPHOCYTES 2.1 0.9 - 3.6 K/UL    ABS. MONOCYTES 0.5 0.05 - 1.2 K/UL    ABS. EOSINOPHILS 0.1 0.0 - 0.4 K/UL    ABS.  BASOPHILS 0.0 0.0 - 0.06 K/UL    DF AUTOMATED     METABOLIC PANEL, BASIC    Collection Time: 08/07/17  4:00 AM   Result Value Ref Range    Sodium 143 136 - 145 mmol/L    Potassium 2.4 (LL) 3.5 - 5.5 mmol/L    Chloride 104 100 - 108 mmol/L    CO2 24 21 - 32 mmol/L    Anion gap 15 3.0 - 18 mmol/L Glucose 116 (H) 74 - 99 mg/dL    BUN 11 7.0 - 18 MG/DL    Creatinine 1.26 0.6 - 1.3 MG/DL    BUN/Creatinine ratio 9 (L) 12 - 20      GFR est AA >60 >60 ml/min/1.73m2    GFR est non-AA >60 >60 ml/min/1.73m2    Calcium 8.8 8.5 - 10.1 MG/DL   MAGNESIUM    Collection Time: 08/07/17  4:00 AM   Result Value Ref Range    Magnesium 2.0 1.6 - 2.6 mg/dL   ACETAMINOPHEN    Collection Time: 08/07/17  4:00 AM   Result Value Ref Range    Acetaminophen level <2 (L) 10 - 30 ug/mL   SALICYLATE    Collection Time: 08/07/17  4:00 AM   Result Value Ref Range    SALICYLATE <7.5 (L) 2.8 - 20 MG/DL       MDM  Number of Diagnoses or Management Options  Generalized weakness:   Hypokalemia:   Diagnosis management comments: Pt denies drug use, but EMS reports lots of marijuana smoke in the home. He does not exhibit any toxidrome, but is clearly altered. Will check electrolytes and hydration status for comorbid features. Possible, but unlikely, head injury. Amount and/or Complexity of Data Reviewed  Clinical lab tests: ordered and reviewed  Tests in the medicine section of CPT®: ordered and reviewed (EKG)  Review and summarize past medical records: yes  Independent visualization of images, tracings, or specimens: yes (EKG)      ED Course     Medications   sodium chloride (NS) flush 5-10 mL (not administered)   sodium chloride (NS) flush 5-10 mL (not administered)        Procedures     PROGRESS NOTE:  3:22 AM  Initial assessment performed. Written by John Rojas ED Scribe, as dictated by Leslee La MD.    DISCHARGE NOTE:  4:55 AM  The patient is ready for discharge. The patient's signs, symptoms, diagnosis, and discharge instructions have been discussed and the patient and/or family has conveyed their understanding. The patient and/or family is to follow up as recommended or return to the ER should their symptoms worsen. Plan has been discussed and the patient and/or family is in agreement. Written by Federica Baez ED Scribe, as dictated by Mandie Rodriguez MD.     CLINICAL IMPRESSION:  1. Hypokalemia    2. Generalized weakness        PLAN:  1. D/C Home    2. Current Discharge Medication List      START taking these medications    Details   potassium chloride (K-DUR, KLOR-CON) 20 mEq tablet Take 1 Tab by mouth three (3) times daily for 3 days. Qty: 9 Tab, Refills: 0         CONTINUE these medications which have NOT CHANGED    Details   losartan-hydroCHLOROthiazide (HYZAAR) 50-12.5 mg per tablet Take 1 Tab by mouth daily. Dose adjustment  Qty: 30 Tab, Refills: 5      levETIRAcetam (KEPPRA) 500 mg tablet Take 3 tablets by mouth in the AM and 3 tablets by mouth in the PM  Qty: 180 Tab, Refills: 5    Associated Diagnoses: Seizure (HCC)      famotidine (PEPCID) 20 mg tablet Take 1 Tab by mouth two (2) times a day. Qty: 60 Tab, Refills: 5             3.   Follow-up Information     Follow up With Details Comments Caryn Duong NP Schedule an appointment as soon as possible for a visit for PCP follow up, as needed [address] [facility] [address] [place] [address]      THE [facility] EMERGENCY DEPT  As needed, If symptoms worsen [address] Michael Bran [phone]          SCRIBE ATTESTATION:  This note is prepared by Francisco J Duarte, acting as Scribe for Mandie Rodriguez MD.    PROVIDER ATTESTATION:  Mandie Rodriguez MD: The scribe's documentation has been prepared under my direction and personally reviewed by me in its entirety. I confirm that the note above accurately reflects all work, treatment, procedures, and medical decision making performed by me.

## 2017-08-07 NOTE — DISCHARGE INSTRUCTIONS
Weakness: Care Instructions  Your Care Instructions  Weakness is a lack of physical or muscle strength. You may feel that you need to make extra effort to move your arms, legs, or other muscles. Generalized weakness means that you feel weak in most areas of your body. Another type of weakness may affect just one muscle or group of muscles. You may feel weak and tired after you have done too much activity, such as taking an extra-long hike. This is not a serious problem. It often goes away on its own. Feeling weak can also be caused by medical conditions like thyroid problems, depression, or a virus. Sometimes the cause can be serious. Your doctor may want to do more tests to try to find the cause of the weakness. The doctor has checked you carefully, but problems can develop later. If you notice any problems or new symptoms, get medical treatment right away. Follow-up care is a key part of your treatment and safety. Be sure to make and go to all appointments, and call your doctor if you are having problems. It's also a good idea to know your test results and keep a list of the medicines you take. How can you care for yourself at home? · Rest when you feel tired. · Be safe with medicines. If your doctor prescribed medicine, take it exactly as prescribed. Call your doctor if you think you are having a problem with your medicine. You will get more details on the specific medicines your doctor prescribes. · Do not skip meals. Eating a balanced diet may increase your energy level. · Get some physical activity every day, but do not get too tired. When should you call for help? Call your doctor now or seek immediate medical care if:  · You have new or worse weakness. · You are dizzy or lightheaded, or you feel like you may faint. Watch closely for changes in your health, and be sure to contact your doctor if:  · You do not get better as expected. Where can you learn more?   Go to http://yaneth.info/. Enter [id number] in the search box to learn more about \"Weakness: Care Instructions. \"  Current as of: March 20, 2017  Content Version: 11.3  © 3406-0671 [employer]. Care instructions adapted under license by [employer] (which disclaims liability or warranty for this information). If you have questions about a medical condition or this instruction, always ask your healthcare professional. [address] any warranty or liability for your use of this information. Hypokalemia: Care Instructions  Your Care Instructions  Hypokalemia (say \"gi-tt-uuk-FIDENCIO-hayley-uh\") is a low level of potassium. The heart, muscles, kidneys, and nervous system all need potassium to work well. This problem has many different causes. Kidney problems, diet, and medicines like diuretics and laxatives can cause it. So can vomiting or diarrhea. In some cases, cancer is the cause. Your doctor may do tests to find the cause of your low potassium levels. You may need medicines to bring your potassium levels back to normal. You may also need regular blood tests to check your potassium. If you have very low potassium, you may need intravenous (IV) medicines. You also may need tests to check the electrical activity of your heart. Heart problems caused by low potassium levels can be very serious. Follow-up care is a key part of your treatment and safety. Be sure to make and go to all appointments, and call your doctor if you are having problems. It's also a good idea to know your test results and keep a list of the medicines you take. How can you care for yourself at home? · If your doctor recommends it, eat foods that have a lot of potassium. These include fresh fruits, juices, and vegetables. They also include nuts, beans, and milk. · Be safe with medicines. If your doctor prescribes medicines or potassium supplements, take them exactly as directed.  Call your doctor if you have any problems with your medicines. · Get your potassium levels tested as often as your doctor tells you. When should you call for help? Call 911 anytime you think you may need emergency care. For example, call if:  · You feel like your heart is missing beats. Heart problems caused by low potassium can cause death. · You passed out (lost consciousness). · You have a seizure. Call your doctor now or seek immediate medical care if:  · You feel weak or unusually tired. · You have severe arm or leg cramps. · You have tingling or numbness. · You feel sick to your stomach, or you vomit. · You have belly cramps. · You feel bloated or constipated. · You have to urinate a lot. · You feel very thirsty most of the time. · You are dizzy or lightheaded, or you feel like you may faint. · You feel depressed, or you lose touch with reality. Watch closely for changes in your health, and be sure to contact your doctor if:  · You do not get better as expected. Where can you learn more? Go to http://samy-georges.info/. Enter G358 in the search box to learn more about \"Hypokalemia: Care Instructions. \"  Current as of: July 28, 2016  Content Version: 11.3  © 2139-3903 [employer]. Care instructions adapted under license by [employer] (which disclaims liability or warranty for this information). If you have questions about a medical condition or this instruction, always ask your healthcare professional. [address] any warranty or liability for your use of this information.

## 2017-08-07 NOTE — ED NOTES
I have reviewed discharge instructions with the patient. The patient verbalized understanding. Discharge medications reviewed with patient and appropriate educational materials and side effects teaching were provided. Armband removed and shredded. He is leaving ambulatory and alert with a medicaid cab to take him home.

## 2017-08-07 NOTE — ED NOTES
Pt hourly rounding competed. Patient sleeping. VSS on RA. Safety   Pt (x) resting on stretcher with side rails up and call bell in reach. () in chair    () in parents arms. Toileting   Pt offered ()Bedpan     ()Assistance to Restroom     ()Urinal  Ongoing Updates  Updated on plan of care and status of test results.   Pain Management  Inquired as to comfort and offered comfort measures:    (x) warm blankets   (x) dimmed lights

## 2017-08-07 NOTE — ED NOTES
Pt hourly rounding competed. Pt sleeping, VSS on RA. Easily arousable. No complaints or requests. Safety   Pt (x) resting on stretcher with side rails up and call bell in reach. () in chair    () in parents arms. Toileting   Pt offered ()Bedpan     ()Assistance to Restroom     (x)Urinal  Ongoing Updates  Updated on plan of care and status of test results.   Pain Management  Inquired as to comfort and offered comfort measures:    (x) warm blankets   (x) dimmed lights

## 2017-08-08 ENCOUNTER — PATIENT OUTREACH (OUTPATIENT)
Dept: FAMILY MEDICINE CLINIC | Age: 32
End: 2017-08-08

## 2017-08-08 LAB
ATRIAL RATE: 76 BPM
CALCULATED P AXIS, ECG09: 50 DEGREES
CALCULATED R AXIS, ECG10: 20 DEGREES
CALCULATED T AXIS, ECG11: 5 DEGREES
DIAGNOSIS, 93000: NORMAL
P-R INTERVAL, ECG05: 152 MS
Q-T INTERVAL, ECG07: 380 MS
QRS DURATION, ECG06: 104 MS
QTC CALCULATION (BEZET), ECG08: 427 MS
VENTRICULAR RATE, ECG03: 76 BPM

## 2018-04-11 ENCOUNTER — OFFICE VISIT (OUTPATIENT)
Dept: FAMILY MEDICINE CLINIC | Age: 33
End: 2018-04-11

## 2018-04-11 VITALS
SYSTOLIC BLOOD PRESSURE: 145 MMHG | BODY MASS INDEX: 35.25 KG/M2 | HEART RATE: 72 BPM | RESPIRATION RATE: 18 BRPM | DIASTOLIC BLOOD PRESSURE: 99 MMHG | WEIGHT: 238 LBS | HEIGHT: 69 IN | TEMPERATURE: 97.5 F | OXYGEN SATURATION: 100 %

## 2018-04-11 DIAGNOSIS — I10 ESSENTIAL HYPERTENSION: Primary | ICD-10-CM

## 2018-04-11 DIAGNOSIS — R56.9 SEIZURE (HCC): ICD-10-CM

## 2018-04-11 DIAGNOSIS — Z76.89 ENCOUNTER TO ESTABLISH CARE WITH NEW DOCTOR: ICD-10-CM

## 2018-04-11 DIAGNOSIS — E66.01 CLASS 2 SEVERE OBESITY DUE TO EXCESS CALORIES WITH SERIOUS COMORBIDITY AND BODY MASS INDEX (BMI) OF 35.0 TO 35.9 IN ADULT (HCC): ICD-10-CM

## 2018-04-11 DIAGNOSIS — L30.8 OTHER ECZEMA: ICD-10-CM

## 2018-04-11 PROBLEM — L30.9 ECZEMA: Status: ACTIVE | Noted: 2018-04-11

## 2018-04-11 RX ORDER — LOSARTAN POTASSIUM AND HYDROCHLOROTHIAZIDE 12.5; 5 MG/1; MG/1
1 TABLET ORAL DAILY
Qty: 30 TAB | Refills: 5 | Status: SHIPPED | OUTPATIENT
Start: 2018-04-11 | End: 2018-10-25 | Stop reason: SDUPTHER

## 2018-04-11 NOTE — PROGRESS NOTES
[address] [facility] Note      Subjective:     Chief Complaint   Patient presents with    Hypertension     follow up   [facility].     patient in office to establish care with provider. Lindsay Dunn is a 27y.o. year old male who presents for evaluation of the following:    Establishment of Care:  Previous PCP: NP [facility] [place] [facility]:   Neurology- Dr. Ashwini Pinzon       PMH:   HTN:   No home monitoring  Tx: losartan-HCTZ- has not had in 6 months due to insurance expiration  No chest pain, shortness of breath, leg swelling, headache    Epilepsy:   Diagnosed in childhood, age 1  Managed by neurology  Last Seizure 12/5/17   Usual Seizure: Eye rolled to back of head shaking all over, onset side of body numb, lies back, tongue biting    GERD:   Tx: pepcid   No current sx    Obesity:   In adulthood, 6 years ago after grandmother passed and he felt depressed  Gained 18 lbs since 8/2017   Diet: unrestricted  Activity: None, plan to join a gym    Eczema:   Uses dove soap and cream- name unknown      Acute Concerns:  Asks new PCP to sing form to allow reduced fair for [facility]. Social:  Originally from [facility] [place]   Non smoker, No alcohol use., never drug use. Works- unemployed, on disability due to Seizure and cannot live alone. Lives with Ex girlfriend and her mother  Mood is \"great\"       Review of Systems   Pertinent positives and negative per HPI. All other systems  reviewed are negative for a Comprehensive ROS (10+). Past Medical History:   Diagnosis Date    Essential hypertension 4/1/2016    Hypertension     Pneumonia Tania 10, 2014    Seizures [facility])         Social History     Social History    Marital status: SINGLE     Spouse name: N/A    Number of children: N/A    Years of education: N/A     Occupational History    Not on file. Social History Main Topics    Smoking status: Never Smoker    Smokeless tobacco: Never Used    Alcohol use No    Drug use:  No  Sexual activity: Yes     Partners: Female     Birth control/ protection: Pill     Other Topics Concern    Not on file     Social History Narrative       Current Outpatient Prescriptions   Medication Sig    losartan-hydroCHLOROthiazide (HYZAAR) 50-12.5 mg per tablet Take 1 Tab by mouth daily. Dose adjustment    famotidine (PEPCID) 20 mg tablet Take 1 Tab by mouth two (2) times a day.  levETIRAcetam (KEPPRA) 500 mg tablet Take 3 tablets by mouth in the AM and 3 tablets by mouth in the PM     No current facility-administered medications for this visit. Objective:     Vitals:    04/11/18 1711 04/11/18 1714   BP: (!) 155/110 (!) 145/99   Pulse: 74 72   Resp: 18    Temp: 97.5 °F (36.4 °C)    TempSrc: Oral    SpO2: 100%    Weight: 238 lb (108 kg)    Height: 5' 9\" (1.753 m)        Physical Examination:  General: Alert, cooperative, no distress, appears stated age. obese  Eyes: Conjunctivae/corneas clear. PERRL, EOMs intact. Ears: Normal external ear canals both ears. Nose: Nares normal. Septum midline. Mouth/Throat: Lips, mucosa, and tongue normal. Teeth and gums normal.  Neck: Supple, symmetrical, trachea midline, no adenopathy. No thyroid enlargement/tenderness/nodules  Lungs: Clear to auscultation bilaterally. Normal inspiratory and expiratory ratio. Heart: Regular rate and rhythm, S1, S2 normal, no murmur, click, rub or gallop. Abdomen: Soft, non-tender. Bowel sounds normal. No masses or organomegaly. Extremities: Extremities normal, atraumatic, no cyanosis or edema. Pulses: 2+ and symmetric all extremities. Skin: Arms with hyperkeratotic/hyperpigmented plaque covering about 50% of forearm  Neurologic: CNII-XII intact. Strength 5/5 grossly. Sensation and reflexes normal throughout. No visits with results within 3 Month(s) from this visit.   Latest known visit with results is:    Admission on 08/07/2017, Discharged on 08/07/2017   Component Date Value Ref Range Status    WBC 08/07/2017 11.2  4.6 - 13.2 K/uL Final    RBC 08/07/2017 4.93  4.70 - 5.50 M/uL Final    HGB 08/07/2017 14.6  13.0 - 16.0 g/dL Final    HCT 08/07/2017 41.4  36.0 - 48.0 % Final    MCV 08/07/2017 84.0  74.0 - 97.0 FL Final    MCH 08/07/2017 29.6  24.0 - 34.0 PG Final    MCHC 08/07/2017 35.3  31.0 - 37.0 g/dL Final    RDW 08/07/2017 12.9  11.6 - 14.5 % Final    PLATELET 08/10/8076 537  135 - 420 K/uL Final    MPV 08/07/2017 10.3  9.2 - 11.8 FL Final    NEUTROPHILS 08/07/2017 76* 40 - 73 % Final    LYMPHOCYTES 08/07/2017 19* 21 - 52 % Final    MONOCYTES 08/07/2017 4  3 - 10 % Final    EOSINOPHILS 08/07/2017 1  0 - 5 % Final    BASOPHILS 08/07/2017 0  0 - 2 % Final    ABS. NEUTROPHILS 08/07/2017 8.4* 1.8 - 8.0 K/UL Final    ABS. LYMPHOCYTES 08/07/2017 2.1  0.9 - 3.6 K/UL Final    ABS. MONOCYTES 08/07/2017 0.5  0.05 - 1.2 K/UL Final    ABS. EOSINOPHILS 08/07/2017 0.1  0.0 - 0.4 K/UL Final    ABS. BASOPHILS 08/07/2017 0.0  0.0 - 0.06 K/UL Final    DF 08/07/2017 AUTOMATED    Final    Sodium 08/07/2017 143  136 - 145 mmol/L Final    Potassium 08/07/2017 2.4* 3.5 - 5.5 mmol/L Final    Comment: CALLED TO AND CORRECTLY REPEATED BY:  RANCHO EDWARD RN ER TO JRW AT 0421 08/07/17      Chloride 08/07/2017 104  100 - 108 mmol/L Final    CO2 08/07/2017 24  21 - 32 mmol/L Final    Anion gap 08/07/2017 15  3.0 - 18 mmol/L Final    Glucose 08/07/2017 116* 74 - 99 mg/dL Final    BUN 08/07/2017 11  7.0 - 18 MG/DL Final    Creatinine 08/07/2017 1.26  0.6 - 1.3 MG/DL Final    BUN/Creatinine ratio 08/07/2017 9* 12 - 20   Final    GFR est AA 08/07/2017 >60  >60 ml/min/1.73m2 Final    GFR est non-AA 08/07/2017 >60  >60 ml/min/1.73m2 Final    Comment: (NOTE)  Estimated GFR is calculated using the Modification of Diet in Renal   Disease (MDRD) Study equation, reported for both  Americans   (GFRAA) and non- Americans (GFRNA), and normalized to 1.73m2   body surface area.  The physician must decide which value applies to   the patient. The MDRD study equation should only be used in   individuals age 25 or older. It has not been validated for the   following: pregnant women, patients with serious comorbid conditions,   or on certain medications, or persons with extremes of body size,   muscle mass, or nutritional status.  Calcium 08/07/2017 8.8  8.5 - 10.1 MG/DL Final    Magnesium 08/07/2017 2.0  1.6 - 2.6 mg/dL Final    Color 08/07/2017 YELLOW    Final    Appearance 08/07/2017 CLEAR    Final    Specific gravity 08/07/2017 1.018  1.005 - 1.030   Final    pH (UA) 08/07/2017 5.5  5.0 - 8.0   Final    Protein 08/07/2017 30* NEG mg/dL Final    Glucose 08/07/2017 NEGATIVE   NEG mg/dL Final    Ketone 08/07/2017 TRACE* NEG mg/dL Final    Bilirubin 08/07/2017 NEGATIVE   NEG   Final    Blood 08/07/2017 NEGATIVE   NEG   Final    Urobilinogen 08/07/2017 0.2  0.2 - 1.0 EU/dL Final    Nitrites 08/07/2017 NEGATIVE   NEG   Final    Leukocyte Esterase 08/07/2017 NEGATIVE   NEG   Final    BENZODIAZEPINES 08/07/2017 NEGATIVE   NEG   Final    BARBITURATES 08/07/2017 NEGATIVE   NEG   Final    THC (TH-CANNABINOL) 08/07/2017 NEGATIVE   NEG   Final    OPIATES 08/07/2017 NEGATIVE   NEG   Final    PCP(PHENCYCLIDINE) 08/07/2017 NEGATIVE   NEG   Final    COCAINE 08/07/2017 NEGATIVE   NEG   Final    AMPHETAMINES 08/07/2017 NEGATIVE   NEG   Final    METHADONE 08/07/2017 NEGATIVE   NEG   Final    HDSCOM 08/07/2017 (NOTE)   Final    Comment: Specimen analysis was performed without chain of custody handling. These results should be used for medical purposes only and not for   legal or employment purposes. Unconfirmed screening results must not   be used for non-medical purposes.     The cut-off concentration for positive results are as follows:    AMPH     1000 ng/mL  EV      200 ng/mL  NORRIS      200 ng/mL  EMILY       300 ng/mL  METH      300 ng/mL  OPI       300 ng/mL  PCP        25 ng/mL  THC        50 ng/mL        Ventricular Rate 08/07/2017 76  BPM Final    Atrial Rate 08/07/2017 76  BPM Final    P-R Interval 08/07/2017 152  ms Final    QRS Duration 08/07/2017 104  ms Final    Q-T Interval 08/07/2017 380  ms Final    QTC Calculation (Bezet) 08/07/2017 427  ms Final    Calculated P Axis 08/07/2017 50  degrees Final    Calculated R Axis 08/07/2017 20  degrees Final    Calculated T Axis 08/07/2017 5  degrees Final    Diagnosis 08/07/2017    Final                    Value:Normal sinus rhythm  Minimal voltage criteria for LVH, may be normal variant  Borderline ECG  Confirmed by Juanita Shay MD, ugesh ([id number]) on 8/8/2017 9:34:33 AM      Acetaminophen level 08/07/2017 <2* 10 - 30 ug/mL Final    Salicylate level 53/09/1591 <2.8* 2.8 - 20 MG/DL Final    WBC 08/07/2017 0 to 1  0 - 5 /hpf Final    RBC 08/07/2017 1 to 2  0 - 5 /hpf Final    Epithelial cells 08/07/2017 0 to 1  0 - 5 /lpf Final    Bacteria 08/07/2017 NEGATIVE   NEG /hpf Final           Assessment/ Plan:   Diagnoses and all orders for this visit:    1. Essential hypertension  -     losartan-hydroCHLOROthiazide (HYZAAR) 50-12.5 mg per tablet; Take 1 Tab by mouth daily.  -     METABOLIC PANEL, COMPREHENSIVE  -     HEMOGLOBIN A1C WITH EAG  -     LIPID PANEL  -     CBC WITH AUTOMATED DIFF    2. Class 2 severe obesity due to excess calories with serious comorbidity and body mass index (BMI) of 35.0 to 35.9 in adult (HCC)    3. Other eczema  -     HEMOGLOBIN A1C WITH EAG    4. Seizure ([facility] [address]    5. Encounter to establish care with new doctor      Blood pressure uncontrolled off medications. Refilled losartan-HCTZ. Encouraged low-salt DASH diet. Labs to eval complications and endorgan function. Diet and safety modification encouraged for weight loss and chronic disease management . Asked patient to bring in cream to next visit. Continue current regimen. Seizure disorder managed by neurologist.  Follow-up with neurologist per routine.   Patient stated he will make an appointment for his neurologist soon. Patient states he has enough Keppra to last him 2 months and does not need a refill today. [facility] transit before completed to verify seizure disorder previously qualify patient for disability. Referenced [employer] transit form completed by previous PCP in 2016    Previous records in epic reviewed briefly. I have discussed the diagnosis with the patient and the intended plan as seen in the above orders. The patient has received an after-visit summary and questions were answered concerning future plans. I have discussed medication side effects and warnings with the patient as well. Follow-up Disposition:  Return in about 4 weeks (around 5/9/2018) for Follow Up.       Signed,    Dipika Barber MD  4/11/2018

## 2018-04-11 NOTE — PATIENT INSTRUCTIONS
DASH Diet: Care Instructions  Your Care Instructions    The DASH diet is an eating plan that can help lower your blood pressure. DASH stands for Dietary Approaches to Stop Hypertension. Hypertension is high blood pressure. The DASH diet focuses on eating foods that are high in calcium, potassium, and magnesium. These nutrients can lower blood pressure. The foods that are highest in these nutrients are fruits, vegetables, low-fat dairy products, nuts, seeds, and legumes. But taking calcium, potassium, and magnesium supplements instead of eating foods that are high in those nutrients does not have the same effect. The DASH diet also includes whole grains, fish, and poultry. The DASH diet is one of several lifestyle changes your doctor may recommend to lower your high blood pressure. Your doctor may also want you to decrease the amount of sodium in your diet. Lowering sodium while following the DASH diet can lower blood pressure even further than just the DASH diet alone. Follow-up care is a key part of your treatment and safety. Be sure to make and go to all appointments, and call your doctor if you are having problems. It's also a good idea to know your test results and keep a list of the medicines you take. How can you care for yourself at home? Following the DASH diet  · Eat 4 to 5 servings of fruit each day. A serving is 1 medium-sized piece of fruit, ½ cup chopped or canned fruit, 1/4 cup dried fruit, or 4 ounces (½ cup) of fruit juice. Choose fruit more often than fruit juice. · Eat 4 to 5 servings of vegetables each day. A serving is 1 cup of lettuce or raw leafy vegetables, ½ cup of chopped or cooked vegetables, or 4 ounces (½ cup) of vegetable juice. Choose vegetables more often than vegetable juice. · Get 2 to 3 servings of low-fat and fat-free dairy each day. A serving is 8 ounces of milk, 1 cup of yogurt, or 1 ½ ounces of cheese. · Eat 6 to 8 servings of grains each day.  A serving is 1 slice of bread, 1 ounce of dry cereal, or ½ cup of cooked rice, pasta, or cooked cereal. Try to choose whole-grain products as much as possible. · Limit lean meat, poultry, and fish to 2 servings each day. A serving is 3 ounces, about the size of a deck of cards. · Eat 4 to 5 servings of nuts, seeds, and legumes (cooked dried beans, lentils, and split peas) each week. A serving is 1/3 cup of nuts, 2 tablespoons of seeds, or ½ cup of cooked beans or peas. · Limit fats and oils to 2 to 3 servings each day. A serving is 1 teaspoon of vegetable oil or 2 tablespoons of salad dressing. · Limit sweets and added sugars to 5 servings or less a week. A serving is 1 tablespoon jelly or jam, ½ cup sorbet, or 1 cup of lemonade. · Eat less than 2,300 milligrams (mg) of sodium a day. If you limit your sodium to 1,500 mg a day, you can lower your blood pressure even more. Tips for success  · Start small. Do not try to make dramatic changes to your diet all at once. You might feel that you are missing out on your favorite foods and then be more likely to not follow the plan. Make small changes, and stick with them. Once those changes become habit, add a few more changes. · Try some of the following:  ¨ Make it a goal to eat a fruit or vegetable at every meal and at snacks. This will make it easy to get the recommended amount of fruits and vegetables each day. ¨ Try yogurt topped with fruit and nuts for a snack or healthy dessert. ¨ Add lettuce, tomato, cucumber, and onion to sandwiches. ¨ Combine a ready-made pizza crust with low-fat mozzarella cheese and lots of vegetable toppings. Try using tomatoes, squash, spinach, broccoli, carrots, cauliflower, and onions. ¨ Have a variety of cut-up vegetables with a low-fat dip as an appetizer instead of chips and dip. ¨ Sprinkle sunflower seeds or chopped almonds over salads. Or try adding chopped walnuts or almonds to cooked vegetables.   ¨ Try some vegetarian meals using beans and peas. Add garbanzo or kidney beans to salads. Make burritos and tacos with mashed mccall beans or black beans. Where can you learn more? Go to http://samy-georges.info/. Enter S824 in the search box to learn more about \"DASH Diet: Care Instructions. \"  Current as of: September 21, 2016  Content Version: 11.4  © 3308-3287 [employer]. Care instructions adapted under license by [employer] (which disclaims liability or warranty for this information). If you have questions about a medical condition or this instruction, always ask your healthcare professional. [address] any warranty or liability for your use of this information. High Blood Pressure: Care Instructions  Your Care Instructions    If your blood pressure is usually above 140/90, you have high blood pressure, or hypertension. That means the top number is 140 or higher or the bottom number is 90 or higher, or both. Despite what a lot of people think, high blood pressure usually doesn't cause headaches or make you feel dizzy or lightheaded. It usually has no symptoms. But it does increase your risk for heart attack, stroke, and kidney or eye damage. The higher your blood pressure, the more your risk increases. Your doctor will give you a goal for your blood pressure. Your goal will be based on your health and your age. An example of a goal is to keep your blood pressure below 140/90. Lifestyle changes, such as eating healthy and being active, are always important to help lower blood pressure. You might also take medicine to reach your blood pressure goal.  Follow-up care is a key part of your treatment and safety. Be sure to make and go to all appointments, and call your doctor if you are having problems. It's also a good idea to know your test results and keep a list of the medicines you take. How can you care for yourself at home?   Medical treatment  · If you stop taking your medicine, your blood pressure will go back up. You may take one or more types of medicine to lower your blood pressure. Be safe with medicines. Take your medicine exactly as prescribed. Call your doctor if you think you are having a problem with your medicine. · Talk to your doctor before you start taking aspirin every day. Aspirin can help certain people lower their risk of a heart attack or stroke. But taking aspirin isn't right for everyone, because it can cause serious bleeding. · See your doctor regularly. You may need to see the doctor more often at first or until your blood pressure comes down. · If you are taking blood pressure medicine, talk to your doctor before you take decongestants or anti-inflammatory medicine, such as ibuprofen. Some of these medicines can raise blood pressure. · Learn how to check your blood pressure at home. Lifestyle changes  · Stay at a healthy weight. This is especially important if you put on weight around the waist. Losing even 10 pounds can help you lower your blood pressure. · If your doctor recommends it, get more exercise. Walking is a good choice. Bit by bit, increase the amount you walk every day. Try for at least 30 minutes on most days of the week. You also may want to swim, bike, or do other activities. · Avoid or limit alcohol. Talk to your doctor about whether you can drink any alcohol. · Try to limit how much sodium you eat to less than 2,300 milligrams (mg) a day. Your doctor may ask you to try to eat less than 1,500 mg a day. · Eat plenty of fruits (such as bananas and oranges), vegetables, legumes, whole grains, and low-fat dairy products. · Lower the amount of saturated fat in your diet. Saturated fat is found in animal products such as milk, cheese, and meat. Limiting these foods may help you lose weight and also lower your risk for heart disease. · Do not smoke. Smoking increases your risk for heart attack and stroke.  If you need help quitting, talk to your doctor about stop-smoking programs and medicines. These can increase your chances of quitting for good. When should you call for help? Call 911 anytime you think you may need emergency care. This may mean having symptoms that suggest that your blood pressure is causing a serious heart or blood vessel problem. Your blood pressure may be over 180/110. ? For example, call 911 if:  ? · You have symptoms of a heart attack. These may include:  ¨ Chest pain or pressure, or a strange feeling in the chest.  ¨ Sweating. ¨ Shortness of breath. ¨ Nausea or vomiting. ¨ Pain, pressure, or a strange feeling in the back, neck, jaw, or upper belly or in one or both shoulders or arms. ¨ Lightheadedness or sudden weakness. ¨ A fast or irregular heartbeat. ? · You have symptoms of a stroke. These may include:  ¨ Sudden numbness, tingling, weakness, or loss of movement in your face, arm, or leg, especially on only one side of your body. ¨ Sudden vision changes. ¨ Sudden trouble speaking. ¨ Sudden confusion or trouble understanding simple statements. ¨ Sudden problems with walking or balance. ¨ A sudden, severe headache that is different from past headaches. ? · You have severe back or belly pain. ?Do not wait until your blood pressure comes down on its own. Get help right away. ?Call your doctor now or seek immediate care if:  ? · Your blood pressure is much higher than normal (such as 180/110 or higher), but you don't have symptoms. ? · You think high blood pressure is causing symptoms, such as:  ¨ Severe headache. ¨ Blurry vision. ? Watch closely for changes in your health, and be sure to contact your doctor if:  ? · Your blood pressure measures 140/90 or higher at least 2 times. That means the top number is 140 or higher or the bottom number is 90 or higher, or both. ? · You think you may be having side effects from your blood pressure medicine.    ? · Your blood pressure is usually normal, but it goes above normal at least 2 times. Where can you learn more? Go to http://samy-georges.info/. Enter G205 in the search box to learn more about \"High Blood Pressure: Care Instructions. \"  Current as of: September 21, 2016  Content Version: 11.4  © 4314-4097 [employer]. Care instructions adapted under license by [employer] (which disclaims liability or warranty for this information). If you have questions about a medical condition or this instruction, always ask your healthcare professional. [address] any warranty or liability for your use of this information.

## 2018-04-11 NOTE — PROGRESS NOTES
Chief Complaint   Patient presents with    Hypertension     follow up   [facility].     patient in office to establish care with provider. Patient has not taken blood pressure medication in about 6 months. 1. Have you been to the ER, urgent care clinic since your last visit? Hospitalized since your last visit? No    2. Have you seen or consulted any other health care providers outside of the [address] [place] [address] since your last visit? Include any pap smears or colon screening.  No

## 2018-04-12 LAB
ALBUMIN SERPL-MCNC: 4.5 G/DL (ref 3.5–5.5)
ALBUMIN/GLOB SERPL: 1.4 {RATIO} (ref 1.2–2.2)
ALP SERPL-CCNC: 94 IU/L (ref 39–117)
ALT SERPL-CCNC: 63 IU/L (ref 0–44)
AST SERPL-CCNC: 40 IU/L (ref 0–40)
BASOPHILS # BLD AUTO: 0 X10E3/UL (ref 0–0.2)
BASOPHILS NFR BLD AUTO: 0 %
BILIRUB SERPL-MCNC: 0.3 MG/DL (ref 0–1.2)
BUN SERPL-MCNC: 10 MG/DL (ref 6–20)
BUN/CREAT SERPL: 11 (ref 9–20)
CALCIUM SERPL-MCNC: 9.1 MG/DL (ref 8.7–10.2)
CHLORIDE SERPL-SCNC: 102 MMOL/L (ref 96–106)
CHOLEST SERPL-MCNC: 179 MG/DL (ref 100–199)
CO2 SERPL-SCNC: 25 MMOL/L (ref 18–29)
CREAT SERPL-MCNC: 0.92 MG/DL (ref 0.76–1.27)
EOSINOPHIL # BLD AUTO: 0.1 X10E3/UL (ref 0–0.4)
EOSINOPHIL NFR BLD AUTO: 1 %
ERYTHROCYTE [DISTWIDTH] IN BLOOD BY AUTOMATED COUNT: 14.3 % (ref 12.3–15.4)
EST. AVERAGE GLUCOSE BLD GHB EST-MCNC: 120 MG/DL
GFR SERPLBLD CREATININE-BSD FMLA CKD-EPI: 108 ML/MIN/1.73
GFR SERPLBLD CREATININE-BSD FMLA CKD-EPI: 125 ML/MIN/1.73
GLOBULIN SER CALC-MCNC: 3.3 G/DL (ref 1.5–4.5)
GLUCOSE SERPL-MCNC: 86 MG/DL (ref 65–99)
HBA1C MFR BLD: 5.8 % (ref 4.8–5.6)
HCT VFR BLD AUTO: 41.8 % (ref 37.5–51)
HDLC SERPL-MCNC: 39 MG/DL
HGB BLD-MCNC: 14.5 G/DL (ref 13–17.7)
IMM GRANULOCYTES # BLD: 0 X10E3/UL (ref 0–0.1)
IMM GRANULOCYTES NFR BLD: 0 %
INTERPRETATION, 910389: NORMAL
LDLC SERPL CALC-MCNC: 73 MG/DL (ref 0–99)
LYMPHOCYTES # BLD AUTO: 3.3 X10E3/UL (ref 0.7–3.1)
LYMPHOCYTES NFR BLD AUTO: 38 %
MCH RBC QN AUTO: 29.8 PG (ref 26.6–33)
MCHC RBC AUTO-ENTMCNC: 34.7 G/DL (ref 31.5–35.7)
MCV RBC AUTO: 86 FL (ref 79–97)
MONOCYTES # BLD AUTO: 0.7 X10E3/UL (ref 0.1–0.9)
MONOCYTES NFR BLD AUTO: 8 %
NEUTROPHILS # BLD AUTO: 4.5 X10E3/UL (ref 1.4–7)
NEUTROPHILS NFR BLD AUTO: 53 %
PLATELET # BLD AUTO: 282 X10E3/UL (ref 150–379)
POTASSIUM SERPL-SCNC: 4.3 MMOL/L (ref 3.5–5.2)
PROT SERPL-MCNC: 7.8 G/DL (ref 6–8.5)
RBC # BLD AUTO: 4.87 X10E6/UL (ref 4.14–5.8)
SODIUM SERPL-SCNC: 144 MMOL/L (ref 134–144)
TRIGL SERPL-MCNC: 335 MG/DL (ref 0–149)
VLDLC SERPL CALC-MCNC: 67 MG/DL (ref 5–40)
WBC # BLD AUTO: 8.6 X10E3/UL (ref 3.4–10.8)

## 2018-04-16 NOTE — PROGRESS NOTES
Outbound call to patient.  verified. Reviewed recent labs with patient. He verbalized understanding.

## 2018-04-16 NOTE — PROGRESS NOTES
Notify Patient:   Most of your test results look good. Your average blood sugar level suggests you have prediabetes. This means you do not have diabetes but you could develop it later on. I would suggest you change your diet to exclude processed foods such as deli meat and hotdogs. You should also avoid bread, crackers, cakes, and cookies. Try replacing them with whole foods, like fruits and vegetables.

## 2018-08-15 NOTE — TELEPHONE ENCOUNTER
----- Message from [facility] Phyllis sent at 8/15/2018 10:35 AM EDT -----  Regarding: Dr. South Screws  Pt [phone] needs a refill on his [address] called into [facility] [phone]. He also would like to discuss the dosaging especially the night time dosage. Pls call to discuss.

## 2018-08-16 RX ORDER — LEVETIRACETAM 1000 MG/1
1000 TABLET ORAL 2 TIMES DAILY
Qty: 60 TAB | Refills: 0 | Status: SHIPPED | OUTPATIENT
Start: 2018-08-16 | End: 2018-10-05 | Stop reason: SDUPTHER

## 2018-08-16 NOTE — TELEPHONE ENCOUNTER
Called and spoke to patient. States he is still on keppra but 1500mg night time dose makes him drowsy.      He has been taking keppra 1,000mg tablets 1 tab twice a day which was previously prescribed by Dr. Rita Daniel

## 2018-10-05 RX ORDER — LEVETIRACETAM 1000 MG/1
TABLET ORAL
Qty: 60 TAB | Refills: 0 | Status: SHIPPED | OUTPATIENT
Start: 2018-10-05 | End: 2018-11-12 | Stop reason: SDUPTHER

## 2018-10-25 DIAGNOSIS — I10 ESSENTIAL HYPERTENSION: ICD-10-CM

## 2018-10-26 RX ORDER — LOSARTAN POTASSIUM AND HYDROCHLOROTHIAZIDE 12.5; 5 MG/1; MG/1
1 TABLET ORAL DAILY
Qty: 30 TAB | Refills: 3 | Status: SHIPPED | OUTPATIENT
Start: 2018-10-26 | End: 2019-03-11 | Stop reason: SDUPTHER

## 2018-11-12 RX ORDER — LEVETIRACETAM 1000 MG/1
TABLET ORAL
Qty: 60 TAB | Refills: 0 | Status: SHIPPED | OUTPATIENT
Start: 2018-11-12 | End: 2020-05-29 | Stop reason: SDUPTHER

## 2019-03-11 ENCOUNTER — OFFICE VISIT (OUTPATIENT)
Dept: FAMILY MEDICINE CLINIC | Age: 34
End: 2019-03-11

## 2019-03-11 VITALS
WEIGHT: 232.8 LBS | TEMPERATURE: 97.6 F | SYSTOLIC BLOOD PRESSURE: 157 MMHG | DIASTOLIC BLOOD PRESSURE: 97 MMHG | BODY MASS INDEX: 34.48 KG/M2 | RESPIRATION RATE: 18 BRPM | HEART RATE: 67 BPM | OXYGEN SATURATION: 98 % | HEIGHT: 69 IN

## 2019-03-11 DIAGNOSIS — I10 ESSENTIAL HYPERTENSION: Primary | ICD-10-CM

## 2019-03-11 DIAGNOSIS — E78.2 ELEVATED TRIGLYCERIDES WITH HIGH CHOLESTEROL: ICD-10-CM

## 2019-03-11 DIAGNOSIS — R73.03 PREDIABETES: ICD-10-CM

## 2019-03-11 DIAGNOSIS — E66.01 CLASS 2 SEVERE OBESITY DUE TO EXCESS CALORIES WITH SERIOUS COMORBIDITY AND BODY MASS INDEX (BMI) OF 35.0 TO 35.9 IN ADULT (HCC): ICD-10-CM

## 2019-03-11 DIAGNOSIS — R56.9 SEIZURE (HCC): ICD-10-CM

## 2019-03-11 RX ORDER — LOSARTAN POTASSIUM AND HYDROCHLOROTHIAZIDE 12.5; 5 MG/1; MG/1
1 TABLET ORAL DAILY
Qty: 30 TAB | Refills: 2 | Status: SHIPPED | OUTPATIENT
Start: 2019-03-11 | End: 2019-03-15 | Stop reason: SDUPTHER

## 2019-03-11 NOTE — PROGRESS NOTES
[facility] Note      Subjective:     Chief Complaint   Patient presents with    Blood Pressure Check     follow up with medication      Lindsay Dunn is a 26y.o. year old male who presents for evaluation of the following:    PMH:   HTN:   No home monitoring  Tx: losartan-HCTZ  No chest pain, shortness of breath, leg swelling, headache  - last medicine taken 3 months ago    Epilepsy:   Diagnosed in childhood, age 1  Managed by neurology Dr. Lorenzo Alegria  Last Seizure 2017  Usual Seizure: Eye rolled to back of head shaking all over, onset side of body numb, lies back, tongue biting    GERD:   Tx: pepcid   No current sx    Obesity:   In adulthood, 6 years ago after grandmother passed and he felt depressed  Wt down 6 lbs in 1 year  Diet: unrestricted  Activity: None, plan to join a gym    Eczema:   Uses dove soap and topical cream- name unknown  Seen by dermatologist, name unknown in 1.20109    Prediabetes:   A1C 5.8 in 4/2018    Elevated triglyceride  Trg 333 in 4/2018    Acute Concerns:  None    Social:  Originally from [place]   Non smoker, No alcohol use., never drug use. Works- unemployed, on disability due to Seizure and cannot live alone. Lives with Ex girlfriend and her mother      Care Team:   Neurology- Dr. Ashwini Pinzon   Dermatologist: name unknown. Last seen 1/2018    Review of Systems   Pertinent positives and negative per HPI. All other systems  reviewed are negative for a Comprehensive ROS (10+).        Past Medical History:   Diagnosis Date    Essential hypertension 4/1/2016    Hypertension     Pneumonia Tania 10, 2014    Seizures [facility])         Social History     Socioeconomic History    Marital status: SINGLE     Spouse name: Not on file    Number of children: Not on file    Years of education: Not on file    Highest education level: Not on file   Social Needs    Financial resource strain: Not on file    Food insecurity - worry: Not on file   [address] [place] [address] Manjinder Food insecurity - inability: Not on file    Transportation needs - medical: Not on file   [employer] needs - non-medical: Not on file   Occupational History    Not on file   Tobacco Use    Smoking status: Never Smoker    Smokeless tobacco: Never Used   Substance and Sexual Activity    Alcohol use: No    Drug use: No    Sexual activity: Yes     Partners: Female     Birth control/protection: Pill   Other Topics Concern    Not on file   Social History Narrative    Not on file       Current Outpatient Medications   Medication Sig    levETIRAcetam 1,000 mg tablet TAKE 1 TABLET BY MOUTH TWICE A DAY    losartan-hydroCHLOROthiazide (HYZAAR) 50-12.5 mg per tablet TAKE 1 TAB BY MOUTH DAILY.  famotidine (PEPCID) 20 mg tablet Take 1 Tab by mouth two (2) times a day. No current facility-administered medications for this visit. Objective:     Vitals:    03/11/19 1013   BP: (!) 157/97   Pulse: 67   Resp: 18   Temp: 97.6 °F (36.4 °C)   TempSrc: Oral   SpO2: 98%   Weight: 232 lb 12.8 oz (105.6 kg)   Height: 5' 9\" (1.753 m)       Physical Examination:  General: Alert, cooperative, no distress, appears stated age. Obese  Eyes: Conjunctivae/corneas clear. PERRL, EOMs intact. Ears: Normal external ear canals both ears. Nose: Nares normal. Septum midline. Mouth/Throat: Lips, mucosa, and tongue normal. Teeth and gums normal.  Neck: Supple, symmetrical, trachea midline, no adenopathy. No thyroid enlargement/tenderness/nodules  Lungs: Clear to auscultation bilaterally. Normal inspiratory and expiratory ratio. Heart: Regular rate and rhythm, S1, S2 normal, no murmur, click, rub or gallop. Abdomen: Soft, non-tender. Bowel sounds normal. No masses or organomegaly. Extremities: Extremities normal, atraumatic, no cyanosis or edema. Pulses: 2+ and symmetric all extremities. Skin: Arms with hyperkeratotic/hyperpigmented plaque covering about 50% of forearm, unchan  Neurologic: CNII-XII intact.  Strength 5/5 grossly. Sensation and reflexes normal throughout. No visits with results within 3 Month(s) from this visit. Latest known visit with results is:   Office Visit on 04/11/2018   Component Date Value Ref Range Status    Glucose 04/11/2018 86  65 - 99 mg/dL Final    BUN 04/11/2018 10  6 - 20 mg/dL Final    Creatinine 04/11/2018 0.92  0.76 - 1.27 mg/dL Final    GFR est non-AA 04/11/2018 108  >59 mL/min/1.73 Final    GFR est AA 04/11/2018 125  >59 mL/min/1.73 Final    BUN/Creatinine ratio 04/11/2018 11  9 - 20 Final    Sodium 04/11/2018 144  134 - 144 mmol/L Final    Potassium 04/11/2018 4.3  3.5 - 5.2 mmol/L Final    Chloride 04/11/2018 102  96 - 106 mmol/L Final    CO2 04/11/2018 25  18 - 29 mmol/L Final    Calcium 04/11/2018 9.1  8.7 - 10.2 mg/dL Final    Protein, total 04/11/2018 7.8  6.0 - 8.5 g/dL Final    Albumin 04/11/2018 4.5  3.5 - 5.5 g/dL Final    GLOBULIN, TOTAL 04/11/2018 3.3  1.5 - 4.5 g/dL Final    A-G Ratio 04/11/2018 1.4  1.2 - 2.2 Final    Bilirubin, total 04/11/2018 0.3  0.0 - 1.2 mg/dL Final    Alk.  phosphatase 04/11/2018 94  39 - 117 IU/L Final    AST (SGOT) 04/11/2018 40  0 - 40 IU/L Final    ALT (SGPT) 04/11/2018 63* 0 - 44 IU/L Final    Hemoglobin A1c 04/11/2018 5.8* 4.8 - 5.6 % Final    Comment:          Pre-diabetes: 5.7 - 6.4           Diabetes: >6.4           Glycemic control for adults with diabetes: <7.0      Estimated average glucose 04/11/2018 120  mg/dL Final    Cholesterol, total 04/11/2018 179  100 - 199 mg/dL Final    Triglyceride 04/11/2018 335* 0 - 149 mg/dL Final    HDL Cholesterol 04/11/2018 39* >39 mg/dL Final    VLDL, calculated 04/11/2018 67* 5 - 40 mg/dL Final    LDL, calculated 04/11/2018 73  0 - 99 mg/dL Final    WBC 04/11/2018 8.6  3.4 - 10.8 x10E3/uL Final    RBC 04/11/2018 4.87  4.14 - 5.80 x10E6/uL Final    HGB 04/11/2018 14.5  13.0 - 17.7 g/dL Final    HCT 04/11/2018 41.8  37.5 - 51.0 % Final    MCV 04/11/2018 86  79 - 97 fL Final    MCH 04/11/2018 29.8  26.6 - 33.0 pg Final    MCHC 04/11/2018 34.7  31.5 - 35.7 g/dL Final    RDW 04/11/2018 14.3  12.3 - 15.4 % Final    PLATELET 76/56/2008 007  150 - 379 x10E3/uL Final    NEUTROPHILS 04/11/2018 53  Not Estab. % Final    Lymphocytes 04/11/2018 38  Not Estab. % Final    MONOCYTES 04/11/2018 8  Not Estab. % Final    EOSINOPHILS 04/11/2018 1  Not Estab. % Final    BASOPHILS 04/11/2018 0  Not Estab. % Final    ABS. NEUTROPHILS 04/11/2018 4.5  1.4 - 7.0 x10E3/uL Final    Abs Lymphocytes 04/11/2018 3.3* 0.7 - 3.1 x10E3/uL Final    ABS. MONOCYTES 04/11/2018 0.7  0.1 - 0.9 x10E3/uL Final    ABS. EOSINOPHILS 04/11/2018 0.1  0.0 - 0.4 x10E3/uL Final    ABS. BASOPHILS 04/11/2018 0.0  0.0 - 0.2 x10E3/uL Final    IMMATURE GRANULOCYTES 04/11/2018 0  Not Estab. % Final    ABS. IMM. GRANS. 04/11/2018 0.0  0.0 - 0.1 x10E3/uL Final    INTERPRETATION 04/11/2018 Note   Final    Supplemental report is available. Assessment/ Plan:   Diagnoses and all orders for this visit:    1. Essential hypertension  -     losartan-hydroCHLOROthiazide (HYZAAR) 50-12.5 mg per tablet; Take 1 Tab by mouth daily.  -     LIPID PANEL  -     CBC WITH AUTOMATED DIFF  -     METABOLIC PANEL, COMPREHENSIVE    2. Seizure ([address]    3. Prediabetes  -     HEMOGLOBIN A1C WITH EAG    4. Class 2 severe obesity due to excess calories with serious comorbidity and body mass index (BMI) of 35.0 to 35.9 in adult ([facility])    5. Elevated triglycerides with high cholesterol      PMH reviewed. Meds refilled for chronic conditions per orders. Labs to eval end organ function and etiology of chronic/acute concerns. Diet and lifestyle modification encouraged for weight loss. . Encouraged counseling for mood disorder. Follow up with specialists per routine including neurology for seizure disorder and dermatologist for eczema. I have discussed the diagnosis with the patient and the intended plan as seen in the above orders.   The patient has received an after-visit summary and questions were answered concerning future plans. I have discussed medication side effects and warnings with the patient as well. Follow-up Disposition:  Return in about 4 weeks (around 4/8/2019) for Follow Up blood pressure.       Signed,    Elías Pierce MD  3/11/2019

## 2019-03-11 NOTE — PATIENT INSTRUCTIONS
Weight Loss Tips:    Remember this is like a part time job so your motivation and commitment is key to your success. Use small plate only  Drink 2 liters (1/2 gallon) of water every day  1/2 of every meal is fruit or vegetable  Try meal prepping on Sunday with new different vegetables. Replace soda with diet soda or other zero sugar drinks (selter water just fine)  Start using the [employer] joceline for calorie counting. Goal 1800 calories per day  Start work out at least 5 days per week for 40 minutes. Consider [employer] joceline for home exercises. DASH Diet: Care Instructions  Your Care Instructions    The DASH diet is an eating plan that can help lower your blood pressure. DASH stands for Dietary Approaches to Stop Hypertension. Hypertension is high blood pressure. The DASH diet focuses on eating foods that are high in calcium, potassium, and magnesium. These nutrients can lower blood pressure. The foods that are highest in these nutrients are fruits, vegetables, low-fat dairy products, nuts, seeds, and legumes. But taking calcium, potassium, and magnesium supplements instead of eating foods that are high in those nutrients does not have the same effect. The DASH diet also includes whole grains, fish, and poultry. The DASH diet is one of several lifestyle changes your doctor may recommend to lower your high blood pressure. Your doctor may also want you to decrease the amount of sodium in your diet. Lowering sodium while following the DASH diet can lower blood pressure even further than just the DASH diet alone. Follow-up care is a key part of your treatment and safety. Be sure to make and go to all appointments, and call your doctor if you are having problems. It's also a good idea to know your test results and keep a list of the medicines you take. How can you care for yourself at home? Following the DASH diet  · Eat 4 to 5 servings of fruit each day.  A serving is 1 medium-sized piece of fruit, ½ cup chopped or canned fruit, 1/4 cup dried fruit, or 4 ounces (½ cup) of fruit juice. Choose fruit more often than fruit juice. · Eat 4 to 5 servings of vegetables each day. A serving is 1 cup of lettuce or raw leafy vegetables, ½ cup of chopped or cooked vegetables, or 4 ounces (½ cup) of vegetable juice. Choose vegetables more often than vegetable juice. · Get 2 to 3 servings of low-fat and fat-free dairy each day. A serving is 8 ounces of milk, 1 cup of yogurt, or 1 ½ ounces of cheese. · Eat 6 to 8 servings of grains each day. A serving is 1 slice of bread, 1 ounce of dry cereal, or ½ cup of cooked rice, pasta, or cooked cereal. Try to choose whole-grain products as much as possible. · Limit lean meat, poultry, and fish to 2 servings each day. A serving is 3 ounces, about the size of a deck of cards. · Eat 4 to 5 servings of nuts, seeds, and legumes (cooked dried beans, lentils, and split peas) each week. A serving is 1/3 cup of nuts, 2 tablespoons of seeds, or ½ cup of cooked beans or peas. · Limit fats and oils to 2 to 3 servings each day. A serving is 1 teaspoon of vegetable oil or 2 tablespoons of salad dressing. · Limit sweets and added sugars to 5 servings or less a week. A serving is 1 tablespoon jelly or jam, ½ cup sorbet, or 1 cup of lemonade. · Eat less than 2,300 milligrams (mg) of sodium a day. If you limit your sodium to 1,500 mg a day, you can lower your blood pressure even more. Tips for success  · Start small. Do not try to make dramatic changes to your diet all at once. You might feel that you are missing out on your favorite foods and then be more likely to not follow the plan. Make small changes, and stick with them. Once those changes become habit, add a few more changes. · Try some of the following:  ? Make it a goal to eat a fruit or vegetable at every meal and at snacks. This will make it easy to get the recommended amount of fruits and vegetables each day.   ? Try yogurt topped with fruit and nuts for a snack or healthy dessert. ? Add lettuce, tomato, cucumber, and onion to sandwiches. ? Combine a ready-made pizza crust with low-fat mozzarella cheese and lots of vegetable toppings. Try using tomatoes, squash, spinach, broccoli, carrots, cauliflower, and onions. ? Have a variety of cut-up vegetables with a low-fat dip as an appetizer instead of chips and dip. ? Sprinkle sunflower seeds or chopped almonds over salads. Or try adding chopped walnuts or almonds to cooked vegetables. ? Try some vegetarian meals using beans and peas. Add garbanzo or kidney beans to salads. Make burritos and tacos with mashed mccall beans or black beans. Where can you learn more? Go to http://samy-georges.info/. Enter X405 in the search box to learn more about \"DASH Diet: Care Instructions. \"  Current as of: July 22, 2018  Content Version: 11.9  © 7152-1666 [employer]. Care instructions adapted under license by [employer] (which disclaims liability or warranty for this information). If you have questions about a medical condition or this instruction, always ask your healthcare professional. [address] any warranty or liability for your use of this information.

## 2019-03-11 NOTE — PROGRESS NOTES
Chief Complaint   Patient presents with    Blood Pressure Check     follow up with medication      1. Have you been to the ER, urgent care clinic since your last visit? Hospitalized since your last visit? No    2. Have you seen or consulted any other health care providers outside of the [facility] since your last visit? Include any pap smears or colon screening.  No

## 2019-03-12 LAB
ALBUMIN SERPL-MCNC: 4.7 G/DL (ref 3.5–5.5)
ALBUMIN/GLOB SERPL: 1.5 {RATIO} (ref 1.2–2.2)
ALP SERPL-CCNC: 102 IU/L (ref 39–117)
ALT SERPL-CCNC: 32 IU/L (ref 0–44)
AST SERPL-CCNC: 25 IU/L (ref 0–40)
BASOPHILS # BLD AUTO: 0 X10E3/UL (ref 0–0.2)
BASOPHILS NFR BLD AUTO: 0 %
BILIRUB SERPL-MCNC: 0.3 MG/DL (ref 0–1.2)
BUN SERPL-MCNC: 9 MG/DL (ref 6–20)
BUN/CREAT SERPL: 10 (ref 9–20)
CALCIUM SERPL-MCNC: 9.4 MG/DL (ref 8.7–10.2)
CHLORIDE SERPL-SCNC: 104 MMOL/L (ref 96–106)
CHOLEST SERPL-MCNC: 181 MG/DL (ref 100–199)
CO2 SERPL-SCNC: 23 MMOL/L (ref 20–29)
CREAT SERPL-MCNC: 0.94 MG/DL (ref 0.76–1.27)
EOSINOPHIL # BLD AUTO: 0.2 X10E3/UL (ref 0–0.4)
EOSINOPHIL NFR BLD AUTO: 2 %
ERYTHROCYTE [DISTWIDTH] IN BLOOD BY AUTOMATED COUNT: 14.5 % (ref 12.3–15.4)
EST. AVERAGE GLUCOSE BLD GHB EST-MCNC: 108 MG/DL
GLOBULIN SER CALC-MCNC: 3.1 G/DL (ref 1.5–4.5)
GLUCOSE SERPL-MCNC: 104 MG/DL (ref 65–99)
HBA1C MFR BLD: 5.4 % (ref 4.8–5.6)
HCT VFR BLD AUTO: 40.2 % (ref 37.5–51)
HDLC SERPL-MCNC: 39 MG/DL
HGB BLD-MCNC: 14.2 G/DL (ref 13–17.7)
IMM GRANULOCYTES # BLD AUTO: 0 X10E3/UL (ref 0–0.1)
IMM GRANULOCYTES NFR BLD AUTO: 0 %
INTERPRETATION, 910389: NORMAL
LDLC SERPL CALC-MCNC: 79 MG/DL (ref 0–99)
LYMPHOCYTES # BLD AUTO: 2.8 X10E3/UL (ref 0.7–3.1)
LYMPHOCYTES NFR BLD AUTO: 35 %
MCH RBC QN AUTO: 29.8 PG (ref 26.6–33)
MCHC RBC AUTO-ENTMCNC: 35.3 G/DL (ref 31.5–35.7)
MCV RBC AUTO: 84 FL (ref 79–97)
MONOCYTES # BLD AUTO: 0.6 X10E3/UL (ref 0.1–0.9)
MONOCYTES NFR BLD AUTO: 8 %
NEUTROPHILS # BLD AUTO: 4.3 X10E3/UL (ref 1.4–7)
NEUTROPHILS NFR BLD AUTO: 55 %
PLATELET # BLD AUTO: 273 X10E3/UL (ref 150–379)
POTASSIUM SERPL-SCNC: 4.4 MMOL/L (ref 3.5–5.2)
PROT SERPL-MCNC: 7.8 G/DL (ref 6–8.5)
RBC # BLD AUTO: 4.77 X10E6/UL (ref 4.14–5.8)
SODIUM SERPL-SCNC: 144 MMOL/L (ref 134–144)
TRIGL SERPL-MCNC: 313 MG/DL (ref 0–149)
VLDLC SERPL CALC-MCNC: 63 MG/DL (ref 5–40)
WBC # BLD AUTO: 7.9 X10E3/UL (ref 3.4–10.8)

## 2019-03-15 NOTE — PROGRESS NOTES
Notify Patient:   Most of your test results are normal.  One of your cholesterol numbers was higher than normal.  Try to avoid foods high in saturated fats such as processed meats, fried foods, and greasy snacks. Weight loss will also help with this.

## 2019-03-19 NOTE — PROGRESS NOTES
Inbound call from patient returning nurse call. Patients name and  verified. Reviewed recent labs with patient and advised of provider recommendations. He verbalized understanding.

## 2019-04-12 ENCOUNTER — OFFICE VISIT (OUTPATIENT)
Dept: FAMILY MEDICINE CLINIC | Age: 34
End: 2019-04-12

## 2019-04-12 VITALS
HEART RATE: 73 BPM | BODY MASS INDEX: 33.53 KG/M2 | WEIGHT: 226.4 LBS | TEMPERATURE: 98.9 F | HEIGHT: 69 IN | OXYGEN SATURATION: 97 % | SYSTOLIC BLOOD PRESSURE: 165 MMHG | RESPIRATION RATE: 19 BRPM | DIASTOLIC BLOOD PRESSURE: 102 MMHG

## 2019-04-12 DIAGNOSIS — I10 ESSENTIAL HYPERTENSION: ICD-10-CM

## 2019-04-12 RX ORDER — HYDROCHLOROTHIAZIDE 25 MG/1
25 TABLET ORAL DAILY
Qty: 90 TAB | Refills: 1 | Status: SHIPPED | OUTPATIENT
Start: 2019-04-12 | End: 2020-03-23 | Stop reason: SDUPTHER

## 2019-04-12 RX ORDER — LOSARTAN POTASSIUM 100 MG/1
100 TABLET ORAL DAILY
Qty: 90 TAB | Refills: 1 | Status: SHIPPED | OUTPATIENT
Start: 2019-04-12 | End: 2020-03-23 | Stop reason: SDUPTHER

## 2019-04-12 NOTE — PROGRESS NOTES
Chief Complaint Patient presents with  Hypertension  
  follow up 1. Have you been to the ER, urgent care clinic since your last visit? Hospitalized since your last visit? No 
 
2. Have you seen or consulted any other health care providers outside of the [address] [place] [address] since your last visit? Include any pap smears or colon screening.  No

## 2019-04-12 NOTE — PROGRESS NOTES
[facility] Clinic Note Subjective: Chief Complaint Patient presents with  Hypertension  
  follow up Mavis Davis is a 26y.o. year old male who presents for evaluation of the following: 
 
PMH:  
HTN:  
No home monitoring Tx: losartan 50 + HCTZ 25 Taking daily as prescribed. No chest pain, shortness of breath, leg swelling, headache Acute Concerns:  
None Social: 
Originally from [address] [place] [address] Road Non smoker, No alcohol use., never drug use. Works- unemployed, on disability due to Seizure and cannot live alone. Lives with Ex girlfriend and her mother Care Team:  
Neurology- Dr. Ramos Ny Dermatologist: name unknown. Last seen 1/2018 Review of Systems Pertinent positives and negative per HPI. All other systems  reviewed are negative for a Comprehensive ROS (10+). Past Medical History:  
Diagnosis Date  Essential hypertension 4/1/2016  Hypertension  Pneumonia Tania 10, 2014  Seizures ([facility] [address] Social History Socioeconomic History  Marital status: SINGLE Spouse name: Not on file  Number of children: Not on file  Years of education: Not on file  Highest education level: Not on file Occupational History  Not on file Social Needs  Financial resource strain: Not on file  Food insecurity:  
  Worry: Not on file Inability: Not on file  Transportation needs:  
  Medical: Not on file Non-medical: Not on file Tobacco Use  Smoking status: Never Smoker  Smokeless tobacco: Never Used Substance and Sexual Activity  Alcohol use: No  
 Drug use: No  
 Sexual activity: Yes  
  Partners: Female Birth control/protection: Pill Lifestyle  Physical activity:  
  Days per week: Not on file Minutes per session: Not on file  Stress: Not on file Relationships  Social connections:  
  Talks on phone: Not on file Gets together: Not on file Attends Holiness service: Not on file Active member of club or organization: Not on file Attends meetings of clubs or organizations: Not on file Relationship status: Not on file  Intimate partner violence:  
  Fear of current or ex partner: Not on file Emotionally abused: Not on file Physically abused: Not on file Forced sexual activity: Not on file Other Topics Concern  Not on file Social History Narrative  Not on file Current Outpatient Medications Medication Sig  
 losartan (COZAAR) 50 mg tablet Take 1 Tab by mouth daily.  hydroCHLOROthiazide (HYDRODIURIL) 12.5 mg tablet Take 1 Tab by mouth daily.  levETIRAcetam 1,000 mg tablet TAKE 1 TABLET BY MOUTH TWICE A DAY  famotidine (PEPCID) 20 mg tablet Take 1 Tab by mouth two (2) times a day. No current facility-administered medications for this visit. Objective:  
 
Vitals:  
 04/12/19 1428 04/12/19 1439 BP: (!) 153/98 (!) 165/102 Pulse:  73 Resp:  19 Temp:  98.9 °F (37.2 °C) TempSrc:  Oral  
SpO2:  97% Weight:  226 lb 6.4 oz (102.7 kg) Height:  5' 9\" (1.753 m) Physical Examination: 
General: Alert, cooperative, no distress, appears stated age. Obese Eyes: Conjunctivae/corneas clear. PERRL, EOMs intact. Ears: Normal external ear canals both ears. Nose: Nares normal. Septum midline. Mouth/Throat: Lips, mucosa, and tongue normal. Teeth and gums normal. 
Neck: Supple, symmetrical, trachea midline, no adenopathy. No thyroid enlargement/tenderness/nodules Lungs: Clear to auscultation bilaterally. Normal inspiratory and expiratory ratio. Heart: Regular rate and rhythm, S1, S2 normal, no murmur, click, rub or gallop. Abdomen: Soft, non-tender. Bowel sounds normal. No masses or organomegaly. Extremities: Extremities normal, atraumatic, no cyanosis or edema. Pulses: 2+ and symmetric all extremities.  
Skin: Arms with hyperkeratotic/hyperpigmented plaque covering about 50% of forearm, unchanged from baseline Neurologic: CNII-XII intact. Strength 5/5 grossly. Sensation and reflexes normal throughout. Office Visit on 03/11/2019 Component Date Value Ref Range Status  Cholesterol, total 03/11/2019 181  100 - 199 mg/dL Final  
 Triglyceride 03/11/2019 313* 0 - 149 mg/dL Final  
 HDL Cholesterol 03/11/2019 39* >39 mg/dL Final  
 VLDL, calculated 03/11/2019 63* 5 - 40 mg/dL Final  
 LDL, calculated 03/11/2019 79  0 - 99 mg/dL Final  
 Hemoglobin A1c 03/11/2019 5.4  4.8 - 5.6 % Final  
 Comment:          Prediabetes: 5.7 - 6.4 Diabetes: >6.4 Glycemic control for adults with diabetes: <7.0  Estimated average glucose 03/11/2019 108  mg/dL Final  
 WBC 03/11/2019 7.9  3.4 - 10.8 x10E3/uL Final  
 RBC 03/11/2019 4.77  4.14 - 5.80 x10E6/uL Final  
 HGB 03/11/2019 14.2  13.0 - 17.7 g/dL Final  
 HCT 03/11/2019 40.2  37.5 - 51.0 % Final  
 MCV 03/11/2019 84  79 - 97 fL Final  
 MCH 03/11/2019 29.8  26.6 - 33.0 pg Final  
 MCHC 03/11/2019 35.3  31.5 - 35.7 g/dL Final  
 RDW 03/11/2019 14.5  12.3 - 15.4 % Final  
 PLATELET 69/09/7214 286  150 - 379 x10E3/uL Final  
 NEUTROPHILS 03/11/2019 55  Not Estab. % Final  
 Lymphocytes 03/11/2019 35  Not Estab. % Final  
 MONOCYTES 03/11/2019 8  Not Estab. % Final  
 EOSINOPHILS 03/11/2019 2  Not Estab. % Final  
 BASOPHILS 03/11/2019 0  Not Estab. % Final  
 ABS. NEUTROPHILS 03/11/2019 4.3  1.4 - 7.0 x10E3/uL Final  
 Abs Lymphocytes 03/11/2019 2.8  0.7 - 3.1 x10E3/uL Final  
 ABS. MONOCYTES 03/11/2019 0.6  0.1 - 0.9 x10E3/uL Final  
 ABS. EOSINOPHILS 03/11/2019 0.2  0.0 - 0.4 x10E3/uL Final  
 ABS. BASOPHILS 03/11/2019 0.0  0.0 - 0.2 x10E3/uL Final  
 IMMATURE GRANULOCYTES 03/11/2019 0  Not Estab. % Final  
 ABS. IMM.  GRANS. 03/11/2019 0.0  0.0 - 0.1 x10E3/uL Final  
 Glucose 03/11/2019 104* 65 - 99 mg/dL Final  
 BUN 03/11/2019 9  6 - 20 mg/dL Final  
  Creatinine 03/11/2019 0.94  0.76 - 1.27 mg/dL Final  
 GFR est non-AA 03/11/2019 105  >59 mL/min/1.73 Final  
 GFR est AA 03/11/2019 121  >59 mL/min/1.73 Final  
 BUN/Creatinine ratio 03/11/2019 10  9 - 20 Final  
 Sodium 03/11/2019 144  134 - 144 mmol/L Final  
 Potassium 03/11/2019 4.4  3.5 - 5.2 mmol/L Final  
 Chloride 03/11/2019 104  96 - 106 mmol/L Final  
 CO2 03/11/2019 23  20 - 29 mmol/L Final  
 Calcium 03/11/2019 9.4  8.7 - 10.2 mg/dL Final  
 Protein, total 03/11/2019 7.8  6.0 - 8.5 g/dL Final  
 Albumin 03/11/2019 4.7  3.5 - 5.5 g/dL Final  
 GLOBULIN, TOTAL 03/11/2019 3.1  1.5 - 4.5 g/dL Final  
 A-G Ratio 03/11/2019 1.5  1.2 - 2.2 Final  
 Bilirubin, total 03/11/2019 0.3  0.0 - 1.2 mg/dL Final  
 Alk. phosphatase 03/11/2019 102  39 - 117 IU/L Final  
 AST (SGOT) 03/11/2019 25  0 - 40 IU/L Final  
 ALT (SGPT) 03/11/2019 32  0 - 44 IU/L Final  
 INTERPRETATION 03/11/2019 Note   Final  
 Supplemental report is available. Assessment/ Plan:  
Diagnoses and all orders for this visit: 1. Essential hypertension 
-     losartan (COZAAR) 100 mg tablet; Take 1 Tab by mouth daily. -     hydroCHLOROthiazide (HYDRODIURIL) 25 mg tablet; Take 1 Tab by mouth daily. BP uncontrolled. Increase losartan and HCTZ. Follow up in 1 month. I have discussed the diagnosis with the patient and the intended plan as seen in the above orders. The patient has received an after-visit summary and questions were answered concerning future plans. I have discussed medication side effects and warnings with the patient as well. Follow-up and Dispositions · Return in about 3 months (around 7/12/2019). Signed, Maggie Szymanski MD 
4/12/2019

## 2019-04-12 NOTE — PATIENT INSTRUCTIONS
DASH Diet: Care Instructions Your Care Instructions The DASH diet is an eating plan that can help lower your blood pressure. DASH stands for Dietary Approaches to Stop Hypertension. Hypertension is high blood pressure. The DASH diet focuses on eating foods that are high in calcium, potassium, and magnesium. These nutrients can lower blood pressure. The foods that are highest in these nutrients are fruits, vegetables, low-fat dairy products, nuts, seeds, and legumes. But taking calcium, potassium, and magnesium supplements instead of eating foods that are high in those nutrients does not have the same effect. The DASH diet also includes whole grains, fish, and poultry. The DASH diet is one of several lifestyle changes your doctor may recommend to lower your high blood pressure. Your doctor may also want you to decrease the amount of sodium in your diet. Lowering sodium while following the DASH diet can lower blood pressure even further than just the DASH diet alone. Follow-up care is a key part of your treatment and safety. Be sure to make and go to all appointments, and call your doctor if you are having problems. It's also a good idea to know your test results and keep a list of the medicines you take. How can you care for yourself at home? Following the DASH diet · Eat 4 to 5 servings of fruit each day. A serving is 1 medium-sized piece of fruit, ½ cup chopped or canned fruit, 1/4 cup dried fruit, or 4 ounces (½ cup) of fruit juice. Choose fruit more often than fruit juice. · Eat 4 to 5 servings of vegetables each day. A serving is 1 cup of lettuce or raw leafy vegetables, ½ cup of chopped or cooked vegetables, or 4 ounces (½ cup) of vegetable juice. Choose vegetables more often than vegetable juice. · Get 2 to 3 servings of low-fat and fat-free dairy each day. A serving is 8 ounces of milk, 1 cup of yogurt, or 1 ½ ounces of cheese. · Eat 6 to 8 servings of grains each day. A serving is 1 slice of bread, 1 ounce of dry cereal, or ½ cup of cooked rice, pasta, or cooked cereal. Try to choose whole-grain products as much as possible. · Limit lean meat, poultry, and fish to 2 servings each day. A serving is 3 ounces, about the size of a deck of cards. · Eat 4 to 5 servings of nuts, seeds, and legumes (cooked dried beans, lentils, and split peas) each week. A serving is 1/3 cup of nuts, 2 tablespoons of seeds, or ½ cup of cooked beans or peas. · Limit fats and oils to 2 to 3 servings each day. A serving is 1 teaspoon of vegetable oil or 2 tablespoons of salad dressing. · Limit sweets and added sugars to 5 servings or less a week. A serving is 1 tablespoon jelly or jam, ½ cup sorbet, or 1 cup of lemonade. · Eat less than 2,300 milligrams (mg) of sodium a day. If you limit your sodium to 1,500 mg a day, you can lower your blood pressure even more. Tips for success · Start small. Do not try to make dramatic changes to your diet all at once. You might feel that you are missing out on your favorite foods and then be more likely to not follow the plan. Make small changes, and stick with them. Once those changes become habit, add a few more changes. · Try some of the following: ? Make it a goal to eat a fruit or vegetable at every meal and at snacks. This will make it easy to get the recommended amount of fruits and vegetables each day. ? Try yogurt topped with fruit and nuts for a snack or healthy dessert. ? Add lettuce, tomato, cucumber, and onion to sandwiches. ? Combine a ready-made pizza crust with low-fat mozzarella cheese and lots of vegetable toppings. Try using tomatoes, squash, spinach, broccoli, carrots, cauliflower, and onions. ? Have a variety of cut-up vegetables with a low-fat dip as an appetizer instead of chips and dip. ? Sprinkle sunflower seeds or chopped almonds over salads.  Or try adding chopped walnuts or almonds to cooked vegetables. ? Try some vegetarian meals using beans and peas. Add garbanzo or kidney beans to salads. Make burritos and tacos with mashed mccall beans or black beans. Where can you learn more? Go to http://samy-georges.info/. Enter Y686 in the search box to learn more about \"DASH Diet: Care Instructions. \" Current as of: July 22, 2018 Content Version: 11.9 © 6628-3067 [employer]. Care instructions adapted under license by [employer] (which disclaims liability or warranty for this information). If you have questions about a medical condition or this instruction, always ask your healthcare professional. [address] any warranty or liability for your use of this information.

## 2019-06-05 ENCOUNTER — IP HISTORICAL/CONVERTED ENCOUNTER (OUTPATIENT)
Dept: OTHER | Age: 34
End: 2019-06-05

## 2020-02-05 ENCOUNTER — TELEPHONE (OUTPATIENT)
Dept: NEUROLOGY | Age: 35
End: 2020-02-05

## 2020-02-05 NOTE — TELEPHONE ENCOUNTER
Patient called stated that she needs a refill of [facility].  Patient is scheduled to come in later this month

## 2020-02-05 NOTE — TELEPHONE ENCOUNTER
Call placed to [facility]. Pharmacy representative reports that the last refill of Keppra was 11/2018. Patient called to clarify who has been ordering Nani Pool for him since 11/2018. Patient states that he currently lives in [place], [place] and has been using medication he had saved up \"for a while\". Patient informed that he must have a provider in [place] prescribe the medication for him. Understanding verbalized.

## 2020-02-10 ENCOUNTER — TELEPHONE (OUTPATIENT)
Dept: NEUROLOGY | Age: 35
End: 2020-02-10

## 2020-02-10 DIAGNOSIS — R56.9 SEIZURES (HCC): Primary | ICD-10-CM

## 2020-02-10 NOTE — TELEPHONE ENCOUNTER
----- Message from Letty Reis sent at 2/10/2020 12:42 PM EST -----  Regarding: Dr. Blum Petoskey  General Message/Vendor Calls    Caller's first and last name:Homar Marte      Reason for call:referral to neurologist in [facility] [address] required yes/no and why:yes      Best contact number(s):[phone](please call to confirm)      Details to clarify the request:[place] and requested a referral to [facility]" Neurology(o) [phone].       Letty Reis

## 2020-03-23 ENCOUNTER — TELEPHONE (OUTPATIENT)
Dept: FAMILY MEDICINE CLINIC | Age: 35
End: 2020-03-23

## 2020-03-23 DIAGNOSIS — I10 ESSENTIAL HYPERTENSION: ICD-10-CM

## 2020-03-23 RX ORDER — HYDROCHLOROTHIAZIDE 25 MG/1
25 TABLET ORAL DAILY
Qty: 60 TAB | Refills: 0 | Status: SHIPPED | OUTPATIENT
Start: 2020-03-23 | End: 2020-05-29 | Stop reason: SDUPTHER

## 2020-03-23 RX ORDER — LOSARTAN POTASSIUM 100 MG/1
100 TABLET ORAL DAILY
Qty: 60 TAB | Refills: 0 | Status: SHIPPED | OUTPATIENT
Start: 2020-03-23 | End: 2020-05-29 | Stop reason: SDUPTHER

## 2020-03-23 NOTE — TELEPHONE ENCOUNTER
----- Message from Kianna Williamson sent at 3/23/2020  3:41 PM EDT -----  Regarding: Tran/ Refill  Contact: [id number] (if not patient):n/a  Relationship of caller (if not patient): n/a  Best contact number(s): [phone]  Name of medication and dosage if known: hydrochlorothiazide 25mg  [id number]    Is patient out of this medication (yes/no): yes  Pharmacy name: Listed in chart  Pharmacy listed in chart? (yes/no): yes  Pharmacy phone number: yes  Date of last visit:4-12-19  Details to clarify the request: n/a

## 2020-03-24 NOTE — TELEPHONE ENCOUNTER
----- Message from Teresita Shields sent at 3/24/2020 10:49 AM EDT -----  Regarding: SAUNDRA Mason/Telephone      Patient returned call to practice. Best contact number is [phone].

## 2020-03-24 NOTE — TELEPHONE ENCOUNTER
Outbound call to patient. Name and  verified. Advised patient medication was sent to the pharmacy however an appt is needed for further refills. He declined to schedule appt at this time.

## 2020-04-16 DIAGNOSIS — I10 ESSENTIAL HYPERTENSION: ICD-10-CM

## 2020-04-17 RX ORDER — LOSARTAN POTASSIUM 100 MG/1
TABLET ORAL
Qty: 30 TAB | Refills: 1 | OUTPATIENT
Start: 2020-04-17

## 2020-04-21 ENCOUNTER — VIRTUAL VISIT (OUTPATIENT)
Dept: FAMILY MEDICINE CLINIC | Age: 35
End: 2020-04-21

## 2020-04-21 DIAGNOSIS — I10 ESSENTIAL HYPERTENSION: ICD-10-CM

## 2020-04-21 NOTE — PROGRESS NOTES
Chief Complaint   Patient presents with    Follow-up    Hypertension     1. Have you been to the ER, urgent care clinic since your last visit? Hospitalized since your last visit? No     2. Have you seen or consulted any other health care providers outside of the [address] [place] [address] since your last visit? Include any pap smears or colon screening.  No

## 2020-04-21 NOTE — PROGRESS NOTES
Virtual visit initiated via doxy. me for follow up visit. Had not been seen in office in more than 1 year. Now resides and currently located in [place]. Patient is planning to move back to [place] next month. Provided 2 month refill in 3/2020 which should last patient through return to [place]. Advised schedule follow up appt. in 1 month when patient is back in state of [facility] medical license or establish care with a local provider for further management.

## 2020-05-26 ENCOUNTER — VIRTUAL VISIT (OUTPATIENT)
Dept: FAMILY MEDICINE CLINIC | Age: 35
End: 2020-05-26

## 2020-05-26 DIAGNOSIS — I10 ESSENTIAL HYPERTENSION: Primary | ICD-10-CM

## 2020-05-26 DIAGNOSIS — R56.9 SEIZURE (HCC): ICD-10-CM

## 2020-05-26 DIAGNOSIS — E66.01 SEVERE OBESITY (BMI 35.0-39.9) WITH COMORBIDITY (HCC): ICD-10-CM

## 2020-05-26 NOTE — PROGRESS NOTES
[id number] [address] Note      Subjective:     Chief Complaint   Patient presents with    Follow Up Chronic Condition     Mi Juarez is a 37y.o. year old male who presents for virtual evaluation of the following:    Patient verified he was current located in [place]. Hypertension:  Chronic. Home monitoring: none  Treatment:   Key CAD CHF Meds             losartan (COZAAR) 100 mg tablet Take 1 Tab by mouth daily. hydroCHLOROthiazide (HYDRODIURIL) 25 mg tablet Take 1 Tab by mouth daily. Not adhering to strict low salt diet   Co-morbidities: Obesity  BP Readings from Last 3 Encounters:   04/12/19 (!) 165/102   03/11/19 (!) 157/97   04/11/18 (!) 145/99       Epilepsy:   Diagnosed in childhood, age 1  Managed by neurology  Typical Seizure: Eye rolled to back of head shaking all over, onset side of body numb, lies back, tongue biting  Last Seizure: within past 1 month. Had a seiure on [facility] and fell asleep  - Taken to ER by EMT  Managed by neurologist, needs to reestablish Cecilio Guerra was left on greyhound after EMS took him to hospital in [employer] car      Obese:   Diet: unrestricted  Activity: None  Last Weight Metrics:  Weight Loss Metrics 4/12/2019 3/11/2019 4/11/2018 8/7/2017 7/17/2017 6/19/2017 5/31/2017   Today's Wt 226 lb 6.4 oz 232 lb 12.8 oz 238 lb 220 lb 221 lb 221 lb 14.4 oz 230 lb 6.4 oz   BMI 33.43 kg/m2 34.38 kg/m2 35.15 kg/m2 32.49 kg/m2 32.64 kg/m2 32.77 kg/m2 34.02 kg/m2        Care Team:   Neurology- Dr. Sveta Cain       Review of Systems   Pertinent positives and negative per HPI. All other systems  reviewed are negative for a Comprehensive ROS (10+).        Past Medical History:   Diagnosis Date    Essential hypertension 4/1/2016    Hypertension     Pneumonia Tania 10, 2014    Seizures [facility])         Social History     Socioeconomic History    Marital status: SINGLE     Spouse name: Not on file    Number of children: Not on file  Years of education: Not on file    Highest education level: Not on file   Occupational History    Not on file   Social Needs    Financial resource strain: Not on file    Food insecurity     Worry: Not on file     Inability: Not on file    Transportation needs     Medical: Not on file     Non-medical: Not on file   Tobacco Use    Smoking status: Never Smoker    Smokeless tobacco: Never Used   Substance and Sexual Activity    Alcohol use: No    Drug use: No    Sexual activity: Yes     Partners: Female     Birth control/protection: Pill   Lifestyle    Physical activity     Days per week: Not on file     Minutes per session: Not on file    Stress: Not on file   Relationships    Social connections     Talks on phone: Not on file     Gets together: Not on file     Attends Amish service: Not on file     Active member of club or organization: Not on file     Attends meetings of clubs or organizations: Not on file     Relationship status: Not on file    Intimate partner violence     Fear of current or ex partner: Not on file     Emotionally abused: Not on file     Physically abused: Not on file     Forced sexual activity: Not on file   Other Topics Concern    Not on file   Social History Narrative    Not on file       Family History   Problem Relation Age of Onset    Hypertension Mother     Heart Disease Father     Hypertension Father     Seizures Daughter        Current Outpatient Medications   Medication Sig    losartan (COZAAR) 100 mg tablet Take 1 Tab by mouth daily.  hydroCHLOROthiazide (HYDRODIURIL) 25 mg tablet Take 1 Tab by mouth daily.  levETIRAcetam 1,000 mg tablet TAKE 1 TABLET BY MOUTH TWICE A DAY    famotidine (PEPCID) 20 mg tablet Take 1 Tab by mouth two (2) times a day. No current facility-administered medications for this visit. Objective: There were no vitals filed for this visit.   Vitals measurement not available      Physical Examination:  General: Alert, cooperative, no distress, appears stated age. Obese  Eyes: Conjunctivae clear. Pupils equally round. Extraocular muscles intact. Ears: Normal appearing external ear   Nose: Nares normal appearing  Mouth/Throat: Lips, mucosa, and tongue normal. Moist mucous membranes. No tonsillar enlargement noted. Neck: Supple, symmetrical, trachea midline, no neck mass visualized. Respiratory: Breathing comfortably, in no acute respiratory distress. Cardiovascular: Visualized extremities without edema. MSK: Upper extremities normal appearing. Skin: Diffuse dry skin and hyperkeratotic skin. Neurologic: No facial asymmetry. Normal gaze. Cranial nerves intact. Psychiatric: Affect appropriate. Mood euthymic. Thoughts logical. Speech volume and speed normal. No hallucinations. Well kempt. No visits with results within 3 Month(s) from this visit. Latest known visit with results is:   Office Visit on 03/11/2019   Component Date Value Ref Range Status    Cholesterol, total 03/11/2019 181  100 - 199 mg/dL Final    Triglyceride 03/11/2019 313* 0 - 149 mg/dL Final    HDL Cholesterol 03/11/2019 39* >39 mg/dL Final    VLDL, calculated 03/11/2019 63* 5 - 40 mg/dL Final    LDL, calculated 03/11/2019 79  0 - 99 mg/dL Final    Hemoglobin A1c 03/11/2019 5.4  4.8 - 5.6 % Final    Comment:          Prediabetes: 5.7 - 6.4           Diabetes: >6.4           Glycemic control for adults with diabetes: <7.0      Estimated average glucose 03/11/2019 108  mg/dL Final    WBC 03/11/2019 7.9  3.4 - 10.8 x10E3/uL Final    RBC 03/11/2019 4.77  4.14 - 5.80 x10E6/uL Final    HGB 03/11/2019 14.2  13.0 - 17.7 g/dL Final    HCT 03/11/2019 40.2  37.5 - 51.0 % Final    MCV 03/11/2019 84  79 - 97 fL Final    MCH 03/11/2019 29.8  26.6 - 33.0 pg Final    MCHC 03/11/2019 35.3  31.5 - 35.7 g/dL Final    RDW 03/11/2019 14.5  12.3 - 15.4 % Final    PLATELET 44/72/6984 712  150 - 379 x10E3/uL Final    NEUTROPHILS 03/11/2019 55  Not Estab. % Final    Lymphocytes 03/11/2019 35  Not Estab. % Final    MONOCYTES 03/11/2019 8  Not Estab. % Final    EOSINOPHILS 03/11/2019 2  Not Estab. % Final    BASOPHILS 03/11/2019 0  Not Estab. % Final    ABS. NEUTROPHILS 03/11/2019 4.3  1.4 - 7.0 x10E3/uL Final    Abs Lymphocytes 03/11/2019 2.8  0.7 - 3.1 x10E3/uL Final    ABS. MONOCYTES 03/11/2019 0.6  0.1 - 0.9 x10E3/uL Final    ABS. EOSINOPHILS 03/11/2019 0.2  0.0 - 0.4 x10E3/uL Final    ABS. BASOPHILS 03/11/2019 0.0  0.0 - 0.2 x10E3/uL Final    IMMATURE GRANULOCYTES 03/11/2019 0  Not Estab. % Final    ABS. IMM. GRANS. 03/11/2019 0.0  0.0 - 0.1 x10E3/uL Final    Glucose 03/11/2019 104* 65 - 99 mg/dL Final    BUN 03/11/2019 9  6 - 20 mg/dL Final    Creatinine 03/11/2019 0.94  0.76 - 1.27 mg/dL Final    GFR est non-AA 03/11/2019 105  >59 mL/min/1.73 Final    GFR est AA 03/11/2019 121  >59 mL/min/1.73 Final    BUN/Creatinine ratio 03/11/2019 10  9 - 20 Final    Sodium 03/11/2019 144  134 - 144 mmol/L Final    Potassium 03/11/2019 4.4  3.5 - 5.2 mmol/L Final    Chloride 03/11/2019 104  96 - 106 mmol/L Final    CO2 03/11/2019 23  20 - 29 mmol/L Final    Calcium 03/11/2019 9.4  8.7 - 10.2 mg/dL Final    Protein, total 03/11/2019 7.8  6.0 - 8.5 g/dL Final    Albumin 03/11/2019 4.7  3.5 - 5.5 g/dL Final    GLOBULIN, TOTAL 03/11/2019 3.1  1.5 - 4.5 g/dL Final    A-G Ratio 03/11/2019 1.5  1.2 - 2.2 Final    Bilirubin, total 03/11/2019 0.3  0.0 - 1.2 mg/dL Final    Alk. phosphatase 03/11/2019 102  39 - 117 IU/L Final    AST (SGOT) 03/11/2019 25  0 - 40 IU/L Final    ALT (SGPT) 03/11/2019 32  0 - 44 IU/L Final    INTERPRETATION 03/11/2019 Note   Final    Supplemental report is available. Assessment/ Plan:   Diagnoses and all orders for this visit:    1. Essential hypertension  -     hydroCHLOROthiazide (HYDRODIURIL) 25 mg tablet; Take 1 Tab by mouth daily. -     losartan (COZAAR) 100 mg tablet;  Take 1 Tab by mouth daily.  -     CBC WITH AUTOMATED DIFF  -     METABOLIC PANEL, COMPREHENSIVE  -     LIPID PANEL    2. Seizure (HCC)  -     levETIRAcetam 1,000 mg tablet; TAKE 1 TABLET BY MOUTH TWICE A DAY    3. Severe obesity (BMI 35.0-39. 9) with comorbidity ([address]      Blood pressure chronic, unable to monitor via virtual visit. Follow up in office within 2 weeks for nurse recheck and labs. Seizure disorder, recent breakthrough seizure. Ran out of medications. Will refill until able to reestablish care with neurology  Diet and lifestyle modification encouraged for weight loss. We discussed the expected course, resolution and complications of the diagnosis(es) in detail. Medication risks, benefits, costs, interactions, and alternatives were discussed as indicated. I advised him to contact the office if his condition worsens, changes or fails to improve as anticipated. He expressed understanding with the diagnosis(es) and plan. Dion Quezada is a 37 y.o. male being evaluated by a video visit encounter for concerns as above. A caregiver was present when appropriate. Due to this being a TeleHealth encounter ([facility]), evaluation of the following organ systems was limited: Vitals/Constitutional/EENT/Resp/CV/GI//MS/Neuro/Skin/Heme-Lymph-Imm. Pursuant to the emergency declaration under the [facility] waiver authority and the [employer] and [employer] Act, this Virtual  Visit was conducted, with patient's (and/or legal guardian's) consent, to reduce the patient's risk of exposure to COVID-19 and provide necessary medical care. Services were provided through a video synchronous discussion virtually to substitute for in-person clinic visit. Provider was at home while conducting this encounter. Patient was at home during encounter.    Other persons participating in call: None  Consent:  He and/or his healthcare decision maker is aware that this patient-initiated Telehealth encounter is a billable service, with coverage as determined by his insurance carrier. He is aware that he may receive a bill and has provided verbal consent to proceed: Yes  This virtual visit was conducted via D[employer] me. Pursuant to the emergency declaration under the [facility] authority and the [employer] and [employer] Act, this Virtual  Visit was conducted to reduce the patient's risk of exposure to COVID-19 and provide continuity of care for an established patient. Services were provided through a video synchronous discussion virtually to substitute for in-person clinic visit. Due to this being a TeleHealth evaluation, many elements of the physical examination are unable to be assessed. Total Time: minutes: 21-30 minutes. Educated patient on red flag symptoms to warrant return to clinic or emergency room visit. I have discussed the diagnosis with the patient and the intended plan as seen in the above orders. The patient has been offered or received an after-visit summary and questions were answered concerning future plans. I have discussed medication side effects and warnings with the patient as well. Follow-up and Dispositions    · Return in about 2 weeks (around 6/9/2020) for Follow Up blood pressure.          Signed,    Pollo Gabriel MD  5/27/2020

## 2020-05-29 RX ORDER — LOSARTAN POTASSIUM 100 MG/1
100 TABLET ORAL DAILY
Qty: 90 TAB | Refills: 1 | Status: SHIPPED | OUTPATIENT
Start: 2020-05-29 | End: 2020-06-01 | Stop reason: ALTCHOICE

## 2020-05-29 RX ORDER — LEVETIRACETAM 1000 MG/1
TABLET ORAL
Qty: 60 TAB | Refills: 0 | Status: SHIPPED | OUTPATIENT
Start: 2020-05-29 | End: 2020-09-15 | Stop reason: SDUPTHER

## 2020-05-29 RX ORDER — HYDROCHLOROTHIAZIDE 25 MG/1
25 TABLET ORAL DAILY
Qty: 90 TAB | Refills: 1 | Status: SHIPPED | OUTPATIENT
Start: 2020-05-29 | End: 2020-11-17 | Stop reason: ALTCHOICE

## 2020-05-29 NOTE — PATIENT INSTRUCTIONS
Weight Loss Tips: 
Remember this is like a part time job so your motivation and commitment is key to your success. Mindset Weight loss like any other behavior change starts in your mind. Think hard about what your motivates you to lose weight then meditate on that. Remind yourself of your motivation 
with phone alarms, scheduled meditation time, vision board, journal- just to name a few ideas. Have realistic goals. We expect with diligent healthy diet and physical activity you can lose 5% of your body weight in 3 
months. Wt in lbs x 0.05 = #lbs you should lose in 3 months. Make food and activity changes with a goal of CONSISTENCY not perfection. Food Start eating differently. Most of your weight loss and gain is from what you eat. Use small plates only Drink 2 liters (1/2 gallon) of water every day HALF of every meal should be fruit or vegetables Try meal prepping on Sunday (or your day off) with new different vegetables. Consider meal prep service such as Cle[employer], wepremeals. com Replace soda with diet soda or other zero sugar drinks (selter water just fine) Consider using the [employer] joceline for calorie counting. Goal 8800-8280 calories per day Activity Staying physically active will help you lose more weight and can help you get over the plateau when you weight just 
won't change any more with diet. Start exercise at least 5 days per week for 40 minutes. Consider [employer] training joceline for home exercises. You can start with walking. I suggest walking at a speed of at least 3.5-4.5mph to for the weight loss benefit. Increase your speed or distance every 2 weeks. Do some slow stretching daily of legs, arms and back. Consider adding weight training with light weights at home or at the gym. See a doctor or a physical training for 
instructions in order to avoid injuries from doing muscle training incorrectly.  
Free fitness program in [place]: [employer] org/program/fitness-warriors/ DASH Diet: Care Instructions Your Care Instructions The DASH diet is an eating plan that can help lower your blood pressure. DASH stands for Dietary Approaches to Stop Hypertension. Hypertension is high blood pressure. The DASH diet focuses on eating foods that are high in calcium, potassium, and magnesium. These nutrients can lower blood pressure. The foods that are highest in these nutrients are fruits, vegetables, low-fat dairy products, nuts, seeds, and legumes. But taking calcium, potassium, and magnesium supplements instead of eating foods that are high in those nutrients does not have the same effect. The DASH diet also includes whole grains, fish, and poultry. The DASH diet is one of several lifestyle changes your doctor may recommend to lower your high blood pressure. Your doctor may also want you to decrease the amount of sodium in your diet. Lowering sodium while following the DASH diet can lower blood pressure even further than just the DASH diet alone. Follow-up care is a key part of your treatment and safety. Be sure to make and go to all appointments, and call your doctor if you are having problems. It's also a good idea to know your test results and keep a list of the medicines you take. How can you care for yourself at home? Following the DASH diet · Eat 4 to 5 servings of fruit each day. A serving is 1 medium-sized piece of fruit, ½ cup chopped or canned fruit, 1/4 cup dried fruit, or 4 ounces (½ cup) of fruit juice. Choose fruit more often than fruit juice. · Eat 4 to 5 servings of vegetables each day. A serving is 1 cup of lettuce or raw leafy vegetables, ½ cup of chopped or cooked vegetables, or 4 ounces (½ cup) of vegetable juice. Choose vegetables more often than vegetable juice. · Get 2 to 3 servings of low-fat and fat-free dairy each day. A serving is 8 ounces of milk, 1 cup of yogurt, or 1 ½ ounces of cheese. · Eat 6 to 8 servings of grains each day. A serving is 1 slice of bread, 1 ounce of dry cereal, or ½ cup of cooked rice, pasta, or cooked cereal. Try to choose whole-grain products as much as possible. · Limit lean meat, poultry, and fish to 2 servings each day. A serving is 3 ounces, about the size of a deck of cards. · Eat 4 to 5 servings of nuts, seeds, and legumes (cooked dried beans, lentils, and split peas) each week. A serving is 1/3 cup of nuts, 2 tablespoons of seeds, or ½ cup of cooked beans or peas. · Limit fats and oils to 2 to 3 servings each day. A serving is 1 teaspoon of vegetable oil or 2 tablespoons of salad dressing. · Limit sweets and added sugars to 5 servings or less a week. A serving is 1 tablespoon jelly or jam, ½ cup sorbet, or 1 cup of lemonade. · Eat less than 2,300 milligrams (mg) of sodium a day. If you limit your sodium to 1,500 mg a day, you can lower your blood pressure even more. Tips for success · Start small. Do not try to make dramatic changes to your diet all at once. You might feel that you are missing out on your favorite foods and then be more likely to not follow the plan. Make small changes, and stick with them. Once those changes become habit, add a few more changes. · Try some of the following: ? Make it a goal to eat a fruit or vegetable at every meal and at snacks. This will make it easy to get the recommended amount of fruits and vegetables each day. ? Try yogurt topped with fruit and nuts for a snack or healthy dessert. ? Add lettuce, tomato, cucumber, and onion to sandwiches. ? Combine a ready-made pizza crust with low-fat mozzarella cheese and lots of vegetable toppings. Try using tomatoes, squash, spinach, broccoli, carrots, cauliflower, and onions. ? Have a variety of cut-up vegetables with a low-fat dip as an appetizer instead of chips and dip. ? Sprinkle sunflower seeds or chopped almonds over salads.  Or try adding chopped walnuts or almonds to cooked vegetables. ? Try some vegetarian meals using beans and peas. Add garbanzo or kidney beans to salads. Make burritos and tacos with mashed mccall beans or black beans. Where can you learn more? Go to http://samy-georges.info/ Enter W535 in the search box to learn more about \"DASH Diet: Care Instructions. \" Current as of: December 16, 2019               Content Version: 12.5 © 8376-6038 [employer]. Care instructions adapted under license by [employer] (which disclaims liability or warranty for this information). If you have questions about a medical condition or this instruction, always ask your healthcare professional. [address] any warranty or liability for your use of this information.

## 2020-06-01 ENCOUNTER — TELEPHONE (OUTPATIENT)
Dept: FAMILY MEDICINE CLINIC | Age: 35
End: 2020-06-01

## 2020-06-01 RX ORDER — OLMESARTAN MEDOXOMIL 40 MG/1
40 TABLET ORAL DAILY
Qty: 90 TAB | Refills: 0 | Status: SHIPPED | OUTPATIENT
Start: 2020-06-01 | End: 2020-11-20 | Stop reason: SDUPTHER

## 2020-06-01 NOTE — TELEPHONE ENCOUNTER
MD Hawa Hayes,    Unfortunately, losartan is unavailable again. Pharmacy requesting alternative. Attached olmesartan 40mg if appropriate. Thanks, Elizabeth    Previous Refill Encounter(s): 5/29/20 90 + 1 (not filled- backordered)    Requested Prescriptions     Pending Prescriptions Disp Refills    olmesartan (BENICAR) 40 mg tablet 90 Tab 1     Sig: Take 1 Tab by mouth daily.

## 2020-06-01 NOTE — TELEPHONE ENCOUNTER
----- Message from Brodie Soulier, MD sent at 6/1/2020  4:18 PM EDT -----  Yes maam   ----- Message -----  From: Duy Saez  Sent: 6/1/2020   2:04 PM EDT  To: Brodie Soulier, MD    Just to confirm, cancel refills on HCTZ and losartan?   ----- Message -----  From: Melony Clay MD  Sent: 5/29/2020   6:15 PM EDT  To: Devyn Balbuena    Call pharmacy and cancel refills- should have only the 3 month supply since needs in-office BP check and labs

## 2020-06-01 NOTE — TELEPHONE ENCOUNTER
Outbound call placed to pharmacist. patients name and  verified. Pharmacist reported understanding and appreciative of call.

## 2020-09-15 ENCOUNTER — OFFICE VISIT (OUTPATIENT)
Dept: NEUROLOGY | Age: 35
End: 2020-09-15
Payer: MEDICAID

## 2020-09-15 VITALS
OXYGEN SATURATION: 98 % | WEIGHT: 244.9 LBS | HEIGHT: 69 IN | RESPIRATION RATE: 20 BRPM | DIASTOLIC BLOOD PRESSURE: 82 MMHG | SYSTOLIC BLOOD PRESSURE: 132 MMHG | HEART RATE: 73 BPM | TEMPERATURE: 98.1 F | BODY MASS INDEX: 36.27 KG/M2

## 2020-09-15 DIAGNOSIS — R56.9 SEIZURE (HCC): ICD-10-CM

## 2020-09-15 DIAGNOSIS — G40.209 COMPLEX PARTIAL SEIZURES EVOLVING TO GENERALIZED TONIC-CLONIC SEIZURES (HCC): Primary | ICD-10-CM

## 2020-09-15 PROCEDURE — 99204 OFFICE O/P NEW MOD 45 MIN: CPT | Performed by: SPECIALIST

## 2020-09-15 RX ORDER — LOSARTAN POTASSIUM 50 MG/1
TABLET ORAL DAILY
COMMUNITY
End: 2020-11-17 | Stop reason: ALTCHOICE

## 2020-09-15 RX ORDER — LEVETIRACETAM 1000 MG/1
TABLET ORAL
Qty: 180 TAB | Refills: 3 | Status: SHIPPED | OUTPATIENT
Start: 2020-09-15 | End: 2021-11-18 | Stop reason: SDUPTHER

## 2020-09-15 NOTE — PROGRESS NOTES
Neurology Consult      Subjective:      Carina Barfield is a 37 y.o. male who comes in today with history of complex partial seizures evolving into generalized seizures. Most recent history includes 1 week ago was apparently witnessed by his mom to have a seizure and apparently had been out of his medicine for 5 days. Before that he had a seizure back on May 21 of this year on a [employer] bus in the [address] [place] [address]. Said he had his medicine present but he could not swallow the tablets without water and there was a delay in taking his medicine as a result may be approaching 2 hours or so? Ended up being transferred to a hospital in the [facility] overnight stay and has been on the medicine since. Went over the importance of getting good sleep minimizing stress and yet staying compliant with medicine meeting taking the medicine on time within 12-hour intervals. Says he understands at this point. No driving for 6 months. Will suggest he follow-up with us in 1 year and he has plenty of medicine to keep him up to speed during that timeframe. Does tell me that in the course of his seizures it is not unusual for him to bite his tongue but he normally does not lose control of his bowel or bladder. Current Outpatient Medications   Medication Sig Dispense Refill    losartan (COZAAR) 50 mg tablet Take  by mouth daily.  levETIRAcetam 1,000 mg tablet TAKE 1 TABLET BY MOUTH TWICE A  Tab 3    olmesartan (BENICAR) 40 mg tablet Take 1 Tab by mouth daily. 90 Tab 0    hydroCHLOROthiazide (HYDRODIURIL) 25 mg tablet Take 1 Tab by mouth daily. 90 Tab 1    famotidine (PEPCID) 20 mg tablet Take 1 Tab by mouth two (2) times a day. 60 Tab 5      Allergies   Allergen Reactions    Amlodipine Swelling     Past Medical History:   Diagnosis Date    Essential hypertension 4/1/2016    Hypertension     Pneumonia Tania 10, 2014    Seizures [facility])       No past surgical history on file.    Social History     Socioeconomic History    Marital status: SINGLE     Spouse name: Not on file    Number of children: Not on file    Years of education: Not on file    Highest education level: Not on file   Occupational History    Not on file   Social Needs    Financial resource strain: Not on file    Food insecurity     Worry: Not on file     Inability: Not on file    Transportation needs     Medical: Not on file     Non-medical: Not on file   Tobacco Use    Smoking status: Never Smoker    Smokeless tobacco: Never Used   Substance and Sexual Activity    Alcohol use: No    Drug use: No    Sexual activity: Yes     Partners: Female     Birth control/protection: Pill   Lifestyle    Physical activity     Days per week: Not on file     Minutes per session: Not on file    Stress: Not on file   Relationships    Social connections     Talks on phone: Not on file     Gets together: Not on file     Attends Latter day service: Not on file     Active member of club or organization: Not on file     Attends meetings of clubs or organizations: Not on file     Relationship status: Not on file    Intimate partner violence     Fear of current or ex partner: Not on file     Emotionally abused: Not on file     Physically abused: Not on file     Forced sexual activity: Not on file   Other Topics Concern    Not on file   Social History Narrative    Not on file      Family History   Problem Relation Age of Onset    Hypertension Mother     Heart Disease Father     Hypertension Father     Seizures Daughter       Visit Vitals  /82   Pulse 73   Temp 98.1 °F (36.7 °C)   Resp 20   Ht 5' 9\" (1.753 m)   Wt 111.1 kg (244 lb 14.4 oz)   SpO2 98%   BMI 36.17 kg/m²        Review of Systems:   A comprehensive review of systems was negative except for that written in the HPI. Neuro Exam:     Appearance: The patient is well developed, well nourished, provides a coherent history and is in no acute distress.    Mental Status: Oriented to time, place and person. Mood and affect appropriate. Cranial Nerves:   Intact visual fields. Fundi are benign. SWAPNA, EOM's full, no nystagmus, no ptosis. Facial sensation is normal. Corneal reflexes are intact. Facial movement is symmetric. Hearing is normal bilaterally. Palate is midline with normal sternocleidomastoid and trapezius muscles are normal. Tongue is midline. Motor:  5/5 strength in upper and lower proximal and distal muscles. Normal bulk and tone. No fasciculations. Reflexes:   Deep tendon reflexes 2+/4 and symmetrical.   Sensory:   Normal to touch, pinprick and vibration. Gait:  Normal gait. Tremor:   No tremor noted. Cerebellar:  No cerebellar signs present. Neurovascular:  Normal heart sounds and regular rhythm, peripheral pulses intact, and no carotid bruits. Assessment:   Complex partial seizures evolving into secondary generalized seizures. Went over the issues fundamental to seizure control including staying compliant with medicine minimizing stress and getting good sleep. Renewed his medicine and he should be okay to go for 1 year. Revisit 1 year. Plan:   Revisit 1 year.   Signed by :  Brandee Alcantar MD

## 2020-09-15 NOTE — LETTER
9/15/20 Patient: Alina Rose YOB: 1985 Date of Visit: 9/15/2020 Patricia Ramos MD 
[address] [facility] [phone] [id number] VIA In Basket Dear Patricia Ramso MD, Thank you for referring Mr. Alina Rose to [address] [place] [address] for evaluation. My notes for this consultation are attached. If you have questions, please do not hesitate to call me. I look forward to following your patient along with you.  
 
 
Sincerely, 
 
Zuleyka Bauman MD

## 2020-09-15 NOTE — PATIENT INSTRUCTIONS
Patient history reviewed patient examined. Will renew the levetiracetam  1000 mg twice a day dose. Cheap advice is to stay on spot with taking his medicine get good sleep and minimize stress. Will suggest revisit in about 1 year and the 500 mg strength levetiracetam is okay to take, it just must be remembered that it takes 2 of those to equal thousand milligrams if he makes that temporary switch.

## 2020-11-09 ENCOUNTER — VIRTUAL VISIT (OUTPATIENT)
Dept: FAMILY MEDICINE CLINIC | Age: 35
End: 2020-11-09

## 2020-11-09 DIAGNOSIS — R56.9 SEIZURE (HCC): ICD-10-CM

## 2020-11-09 DIAGNOSIS — E66.01 SEVERE OBESITY (BMI 35.0-39.9) WITH COMORBIDITY (HCC): ICD-10-CM

## 2020-11-09 DIAGNOSIS — I10 ESSENTIAL HYPERTENSION: Primary | ICD-10-CM

## 2020-11-09 NOTE — PATIENT INSTRUCTIONS
DASH Diet: Care Instructions Your Care Instructions The DASH diet is an eating plan that can help lower your blood pressure. DASH stands for Dietary Approaches to Stop Hypertension. Hypertension is high blood pressure. The DASH diet focuses on eating foods that are high in calcium, potassium, and magnesium. These nutrients can lower blood pressure. The foods that are highest in these nutrients are fruits, vegetables, low-fat dairy products, nuts, seeds, and legumes. But taking calcium, potassium, and magnesium supplements instead of eating foods that are high in those nutrients does not have the same effect. The DASH diet also includes whole grains, fish, and poultry. The DASH diet is one of several lifestyle changes your doctor may recommend to lower your high blood pressure. Your doctor may also want you to decrease the amount of sodium in your diet. Lowering sodium while following the DASH diet can lower blood pressure even further than just the DASH diet alone. Follow-up care is a key part of your treatment and safety. Be sure to make and go to all appointments, and call your doctor if you are having problems. It's also a good idea to know your test results and keep a list of the medicines you take. How can you care for yourself at home? Following the DASH diet · Eat 4 to 5 servings of fruit each day. A serving is 1 medium-sized piece of fruit, ½ cup chopped or canned fruit, 1/4 cup dried fruit, or 4 ounces (½ cup) of fruit juice. Choose fruit more often than fruit juice. · Eat 4 to 5 servings of vegetables each day. A serving is 1 cup of lettuce or raw leafy vegetables, ½ cup of chopped or cooked vegetables, or 4 ounces (½ cup) of vegetable juice. Choose vegetables more often than vegetable juice. · Get 2 to 3 servings of low-fat and fat-free dairy each day. A serving is 8 ounces of milk, 1 cup of yogurt, or 1 ½ ounces of cheese. · Eat 6 to 8 servings of grains each day. A serving is 1 slice of bread, 1 ounce of dry cereal, or ½ cup of cooked rice, pasta, or cooked cereal. Try to choose whole-grain products as much as possible. · Limit lean meat, poultry, and fish to 2 servings each day. A serving is 3 ounces, about the size of a deck of cards. · Eat 4 to 5 servings of nuts, seeds, and legumes (cooked dried beans, lentils, and split peas) each week. A serving is 1/3 cup of nuts, 2 tablespoons of seeds, or ½ cup of cooked beans or peas. · Limit fats and oils to 2 to 3 servings each day. A serving is 1 teaspoon of vegetable oil or 2 tablespoons of salad dressing. · Limit sweets and added sugars to 5 servings or less a week. A serving is 1 tablespoon jelly or jam, ½ cup sorbet, or 1 cup of lemonade. · Eat less than 2,300 milligrams (mg) of sodium a day. If you limit your sodium to 1,500 mg a day, you can lower your blood pressure even more. Tips for success · Start small. Do not try to make dramatic changes to your diet all at once. You might feel that you are missing out on your favorite foods and then be more likely to not follow the plan. Make small changes, and stick with them. Once those changes become habit, add a few more changes. · Try some of the following: ? Make it a goal to eat a fruit or vegetable at every meal and at snacks. This will make it easy to get the recommended amount of fruits and vegetables each day. ? Try yogurt topped with fruit and nuts for a snack or healthy dessert. ? Add lettuce, tomato, cucumber, and onion to sandwiches. ? Combine a ready-made pizza crust with low-fat mozzarella cheese and lots of vegetable toppings. Try using tomatoes, squash, spinach, broccoli, carrots, cauliflower, and onions. ? Have a variety of cut-up vegetables with a low-fat dip as an appetizer instead of chips and dip. ? Sprinkle sunflower seeds or chopped almonds over salads.  Or try adding chopped walnuts or almonds to cooked vegetables. ? Try some vegetarian meals using beans and peas. Add garbanzo or kidney beans to salads. Make burritos and tacos with mashed mccall beans or black beans. Where can you learn more? Go to http://www.gray.com/ Enter O564 in the search box to learn more about \"DASH Diet: Care Instructions. \" Current as of: December 16, 2019               Content Version: 12.6 © 7902-1639 [employer]. Care instructions adapted under license by [employer] (which disclaims liability or warranty for this information). If you have questions about a medical condition or this instruction, always ask your healthcare professional. [address] any warranty or liability for your use of this information.

## 2020-11-09 NOTE — PROGRESS NOTES
[id number] [address] Note      Subjective:     Chief Complaint   Patient presents with   Franki James is a 37y.o. year old male who presents for virtual evaluation of the following:    Patient verified he was current located in [place]. Hypertension:  Chronic. Home monitoring: none  Treatment:   Key CAD CHF Meds             losartan (COZAAR) 50 mg tablet Take  by mouth daily. olmesartan (BENICAR) 40 mg tablet Take 1 Tab by mouth daily. hydroCHLOROthiazide (HYDRODIURIL) 25 mg tablet Take 1 Tab by mouth daily. Not adhering to strict low salt diet   Co-morbidities: Obesity  BP Readings from Last 3 Encounters:   09/15/20 132/82   04/12/19 (!) 165/102   03/11/19 (!) 157/97       Epilepsy:   Diagnosed in childhood, age 1  Managed by neurology  Typical Seizure: Eye rolled to back of head shaking all over, onset side of body numb, lies back, tongue biting  Last Seizure: 9/2020  Managed by neurologist    Obese:   Diet: unrestricted  Activity: None  Last Weight Metrics:  Weight Loss Metrics 9/15/2020 4/12/2019 3/11/2019 4/11/2018 8/7/2017 7/17/2017 6/19/2017   Today's Wt 244 lb 14.4 oz 226 lb 6.4 oz 232 lb 12.8 oz 238 lb 220 lb 221 lb 221 lb 14.4 oz   BMI 36.17 kg/m2 33.43 kg/m2 34.38 kg/m2 35.15 kg/m2 32.49 kg/m2 32.64 kg/m2 32.77 kg/m2         Care Team:   Neurology- Dr. Amaris Zepeda       Review of Systems   Pertinent positives and negative per HPI. All other systems  reviewed are negative for a Comprehensive ROS (10+).        Past Medical History:   Diagnosis Date    Essential hypertension 4/1/2016    Hypertension     Pneumonia Tania 10, 2014    Seizures [facility])         Social History     Socioeconomic History    Marital status: SINGLE     Spouse name: Not on file    Number of children: Not on file    Years of education: Not on file    Highest education level: Not on file   Occupational History    Not on file   Social Needs    Financial resource strain: Not on file    Food insecurity     Worry: Not on file     Inability: Not on file    Transportation needs     Medical: Not on file     Non-medical: Not on file   Tobacco Use    Smoking status: Never Smoker    Smokeless tobacco: Never Used   Substance and Sexual Activity    Alcohol use: No    Drug use: No    Sexual activity: Yes     Partners: Female     Birth control/protection: Pill   Lifestyle    Physical activity     Days per week: Not on file     Minutes per session: Not on file    Stress: Not on file   Relationships    Social connections     Talks on phone: Not on file     Gets together: Not on file     Attends Restorationism service: Not on file     Active member of club or organization: Not on file     Attends meetings of clubs or organizations: Not on file     Relationship status: Not on file    Intimate partner violence     Fear of current or ex partner: Not on file     Emotionally abused: Not on file     Physically abused: Not on file     Forced sexual activity: Not on file   Other Topics Concern    Not on file   Social History Narrative    Not on file       Family History   Problem Relation Age of Onset    Hypertension Mother     Heart Disease Father     Hypertension Father     Seizures Daughter        Current Outpatient Medications   Medication Sig    losartan (COZAAR) 50 mg tablet Take  by mouth daily.  levETIRAcetam 1,000 mg tablet TAKE 1 TABLET BY MOUTH TWICE A DAY    olmesartan (BENICAR) 40 mg tablet Take 1 Tab by mouth daily.  hydroCHLOROthiazide (HYDRODIURIL) 25 mg tablet Take 1 Tab by mouth daily.  famotidine (PEPCID) 20 mg tablet Take 1 Tab by mouth two (2) times a day. No current facility-administered medications for this visit. Objective: There were no vitals filed for this visit. Vitals measurement not available      Physical Examination:  General: Alert, cooperative, no distress, appears stated age. Obese  Eyes: Conjunctivae clear.  Pupils equally round. Extraocular muscles intact. Ears: Normal appearing external ear   Nose: Nares normal appearing  Mouth/Throat: Lips, mucosa, and tongue normal. Moist mucous membranes. No tonsillar enlargement noted. Neck: Supple, symmetrical, trachea midline, no neck mass visualized. Respiratory: Breathing comfortably, in no acute respiratory distress. Cardiovascular: Visualized extremities without edema. MSK: Upper extremities normal appearing. Skin: Diffuse dry skin and hyperkeratotic skin. Neurologic: No facial asymmetry. Normal gaze. Cranial nerves intact. Psychiatric: Affect appropriate. Mood euthymic. Thoughts logical. Speech volume and speed normal. No hallucinations. Well kempt. No visits with results within 3 Month(s) from this visit.    Latest known visit with results is:   Office Visit on 03/11/2019   Component Date Value Ref Range Status    Cholesterol, total 03/11/2019 181  100 - 199 mg/dL Final    Triglyceride 03/11/2019 313* 0 - 149 mg/dL Final    HDL Cholesterol 03/11/2019 39* >39 mg/dL Final    VLDL, calculated 03/11/2019 63* 5 - 40 mg/dL Final    LDL, calculated 03/11/2019 79  0 - 99 mg/dL Final    Hemoglobin A1c 03/11/2019 5.4  4.8 - 5.6 % Final    Comment:          Prediabetes: 5.7 - 6.4           Diabetes: >6.4           Glycemic control for adults with diabetes: <7.0      Estimated average glucose 03/11/2019 108  mg/dL Final    WBC 03/11/2019 7.9  3.4 - 10.8 x10E3/uL Final    RBC 03/11/2019 4.77  4.14 - 5.80 x10E6/uL Final    HGB 03/11/2019 14.2  13.0 - 17.7 g/dL Final    HCT 03/11/2019 40.2  37.5 - 51.0 % Final    MCV 03/11/2019 84  79 - 97 fL Final    MCH 03/11/2019 29.8  26.6 - 33.0 pg Final    MCHC 03/11/2019 35.3  31.5 - 35.7 g/dL Final    RDW 03/11/2019 14.5  12.3 - 15.4 % Final    PLATELET 64/17/1606 658  150 - 379 x10E3/uL Final    NEUTROPHILS 03/11/2019 55  Not Estab. % Final    Lymphocytes 03/11/2019 35  Not Estab. % Final    MONOCYTES 03/11/2019 8  Not Estab. % Final    EOSINOPHILS 03/11/2019 2  Not Estab. % Final    BASOPHILS 03/11/2019 0  Not Estab. % Final    ABS. NEUTROPHILS 03/11/2019 4.3  1.4 - 7.0 x10E3/uL Final    Abs Lymphocytes 03/11/2019 2.8  0.7 - 3.1 x10E3/uL Final    ABS. MONOCYTES 03/11/2019 0.6  0.1 - 0.9 x10E3/uL Final    ABS. EOSINOPHILS 03/11/2019 0.2  0.0 - 0.4 x10E3/uL Final    ABS. BASOPHILS 03/11/2019 0.0  0.0 - 0.2 x10E3/uL Final    IMMATURE GRANULOCYTES 03/11/2019 0  Not Estab. % Final    ABS. IMM. GRANS. 03/11/2019 0.0  0.0 - 0.1 x10E3/uL Final    Glucose 03/11/2019 104* 65 - 99 mg/dL Final    BUN 03/11/2019 9  6 - 20 mg/dL Final    Creatinine 03/11/2019 0.94  0.76 - 1.27 mg/dL Final    GFR est non-AA 03/11/2019 105  >59 mL/min/1.73 Final    GFR est AA 03/11/2019 121  >59 mL/min/1.73 Final    BUN/Creatinine ratio 03/11/2019 10  9 - 20 Final    Sodium 03/11/2019 144  134 - 144 mmol/L Final    Potassium 03/11/2019 4.4  3.5 - 5.2 mmol/L Final    Chloride 03/11/2019 104  96 - 106 mmol/L Final    CO2 03/11/2019 23  20 - 29 mmol/L Final    Calcium 03/11/2019 9.4  8.7 - 10.2 mg/dL Final    Protein, total 03/11/2019 7.8  6.0 - 8.5 g/dL Final    Albumin 03/11/2019 4.7  3.5 - 5.5 g/dL Final    GLOBULIN, TOTAL 03/11/2019 3.1  1.5 - 4.5 g/dL Final    A-G Ratio 03/11/2019 1.5  1.2 - 2.2 Final    Bilirubin, total 03/11/2019 0.3  0.0 - 1.2 mg/dL Final    Alk. phosphatase 03/11/2019 102  39 - 117 IU/L Final    AST (SGOT) 03/11/2019 25  0 - 40 IU/L Final    ALT (SGPT) 03/11/2019 32  0 - 44 IU/L Final    INTERPRETATION 03/11/2019 Note   Final    Supplemental report is available. Assessment/ Plan:   Diagnoses and all orders for this visit:    1. Essential hypertension    2. Seizure ([address]    3. Severe obesity (BMI 35.0-39. 9) with comorbidity ([address]      Blood pressure chronic, unable to monitor via virtual visit. Follow up in office in 1 week. report of low K with no labs to support and normal K on recent ED visit.  Need in office visit  Report of leg pain for past 3 months. Need in office evaluation  Seizure disorder, recent breakthrough seizure. Follow up with neurologist  Diet and lifestyle modification encouraged for weight loss. We discussed the expected course, resolution and complications of the diagnosis(es) in detail. Medication risks, benefits, costs, interactions, and alternatives were discussed as indicated. I advised him to contact the office if his condition worsens, changes or fails to improve as anticipated. He expressed understanding with the diagnosis(es) and plan. Nelida Graff is a 37 y.o. male being evaluated by a video visit encounter for concerns as above. A caregiver was present when appropriate. Due to this being a TeleHealth encounter (During [facility] public [facility] emergency), evaluation of the following organ systems was limited: Vitals/Constitutional/EENT/Resp/CV/GI//MS/Neuro/Skin/Heme-Lymph-Imm. Pursuant to the emergency declaration under the [facility], 1135 waiver authority and the [employer] and [employer] Act, this Virtual  Visit was conducted, with patient's (and/or legal guardian's) consent, to reduce the patient's risk of exposure to COVID-19 and provide necessary medical care. Services were provided through a video synchronous discussion virtually to substitute for in-person clinic visit. Provider was at home while conducting this encounter. Patient was at home during encounter. Other persons participating in call: None  Consent:  He and/or his healthcare decision maker is aware that this patient-initiated Telehealth encounter is a billable service, with coverage as determined by his insurance carrier. He is aware that he may receive a bill and has provided verbal consent to proceed: Yes  This virtual visit was conducted via [employer] me.   Pursuant to the emergency declaration under the [address] and the National Emergencies Act, [facility] authority and the [employer] General Act, this Virtual  Visit was conducted to reduce the patient's risk of exposure to COVID-19 and provide continuity of care for an established patient. Services were provided through a video synchronous discussion virtually to substitute for in-person clinic visit. Due to this being a TeleHealth evaluation, many elements of the physical examination are unable to be assessed. Total Time: minutes: 21-30 minutes. Educated patient on red flag symptoms to warrant return to clinic or emergency room visit. I have discussed the diagnosis with the patient and the intended plan as seen in the above orders. The patient has been offered or received an after-visit summary and questions were answered concerning future plans. I have discussed medication side effects and warnings with the patient as well. Follow-up and Dispositions    · Return in about 1 week (around 11/16/2020) for Follow Up blood pressure.        Signed,    Roderick Kurtz MD  11/9/2020

## 2020-11-17 ENCOUNTER — OFFICE VISIT (OUTPATIENT)
Dept: FAMILY MEDICINE CLINIC | Age: 35
End: 2020-11-17
Payer: MEDICAID

## 2020-11-17 VITALS
DIASTOLIC BLOOD PRESSURE: 99 MMHG | HEART RATE: 54 BPM | TEMPERATURE: 98.7 F | OXYGEN SATURATION: 99 % | BODY MASS INDEX: 34.19 KG/M2 | WEIGHT: 238.8 LBS | RESPIRATION RATE: 20 BRPM | HEIGHT: 70 IN | SYSTOLIC BLOOD PRESSURE: 156 MMHG

## 2020-11-17 DIAGNOSIS — E87.6 HYPOKALEMIA: ICD-10-CM

## 2020-11-17 DIAGNOSIS — E66.01 SEVERE OBESITY (BMI 35.0-39.9) WITH COMORBIDITY (HCC): ICD-10-CM

## 2020-11-17 DIAGNOSIS — L30.8 OTHER ECZEMA: ICD-10-CM

## 2020-11-17 DIAGNOSIS — R56.9 SEIZURE (HCC): ICD-10-CM

## 2020-11-17 DIAGNOSIS — I10 ESSENTIAL HYPERTENSION: ICD-10-CM

## 2020-11-17 DIAGNOSIS — Z00.00 ENCOUNTER FOR WELLNESS EXAMINATION: Primary | ICD-10-CM

## 2020-11-17 PROCEDURE — 99395 PREV VISIT EST AGE 18-39: CPT | Performed by: FAMILY MEDICINE

## 2020-11-17 PROCEDURE — 99213 OFFICE O/P EST LOW 20 MIN: CPT | Performed by: FAMILY MEDICINE

## 2020-11-17 RX ORDER — HYDROCHLOROTHIAZIDE 25 MG/1
25 TABLET ORAL DAILY
Qty: 90 TAB | Refills: 1 | Status: CANCELLED | OUTPATIENT
Start: 2020-11-17

## 2020-11-17 RX ORDER — LOSARTAN POTASSIUM 50 MG/1
TABLET ORAL DAILY
Status: CANCELLED | OUTPATIENT
Start: 2020-11-17

## 2020-11-17 NOTE — PROGRESS NOTES
Chief Complaint   Patient presents with    Labs    Seizure     9/8/20; needs a form completed    Hypertension     refill       1. Have you been to the ER, urgent care clinic since your last visit? Hospitalized since your last visit? No    2. Have you seen or consulted any other health care providers outside of the [address] [place] [address] since your last visit? Include any pap smears or colon screening.  No    3 most recent PHQ Screens 11/17/2020   PHQ Not Done -   Little interest or pleasure in doing things Not at all   Feeling down, depressed, irritable, or hopeless Not at all   Total Score PHQ 2 0       AGUILAR 2/7 11/17/2020 4/21/2020   Feeling nervous, anxious or on edge? 0 0   Not being able to stop or control worrying? 0 0   AGUILAR-2 Subtotal 0 0

## 2020-11-17 NOTE — PROGRESS NOTES
[facility] Note      Subjective:     Chief Complaint   Patient presents with   [facility]    Seizure     9/8/20; needs a form completed    Hypertension     refill     Kat Carrasco is a 37y.o. year old male who presents for virtual evaluation of the following:      Hypertension:  Chronic. Home monitoring: none  Treatment:   Key CAD CHF Meds             olmesartan (BENICAR) 40 mg tablet (Taking) Take 1 Tab by mouth daily. Previous Tx: amlodipine (swelling)  Not adhering to strict low salt diet   Co-morbidities: Obesity  BP Readings from Last 3 Encounters:   11/17/20 (!) 156/99   09/15/20 132/82   04/12/19 (!) 165/102       Hypokalemia:   K 3.4 in ED 5/2020- found in Care Everywhere tab    Epilepsy:   Diagnosed in childhood, age 1  Tx: keppra  Managed by neurology  Typical Seizure: Eye rolled to back of head shaking all over, onset side of body numb, lies back, tongue biting  Last Seizure: 9/2020  Managed by neurologist  Requests reduced bus fare form based on Seizure disorder. Obese:   Diet: unrestricted  Activity: None  Last Weight Metrics:  Weight Loss Metrics 11/17/2020 9/15/2020 4/12/2019 3/11/2019 4/11/2018 8/7/2017 7/17/2017   Today's Wt 238 lb 12.8 oz 244 lb 14.4 oz 226 lb 6.4 oz 232 lb 12.8 oz 238 lb 220 lb 221 lb   BMI 34.76 kg/m2 36.17 kg/m2 33.43 kg/m2 34.38 kg/m2 35.15 kg/m2 32.49 kg/m2 32.64 kg/m2         Health Maintenance:   Health Maintenance   Topic Date Due    DTaP/Tdap/Td series (2 - Td) 01/10/2015    Flu Vaccine  Completed    Pneumococcal 0-64 years  Aged Out       HIV or other STD testing: Declined  Depression Screen:   3 most recent PHQ Screens 11/17/2020   PHQ Not Done -   Little interest or pleasure in doing things Not at all   Feeling down, depressed, irritable, or hopeless Not at all   Total Score PHQ 2 0     Smoker? Never      reports being sexually active and has had partner(s) who are Female.  He reports using the following method of birth control/protection: Pill. Prostate Check:   No results found for: PSA, Marilu Argueta, PSAR3, [id number], [id number], PSALT, 54101, PSAEXT  No Fam Hx of prostate cancer   Denies groin pain/pulling, penile bleeding/discharge, dysuria, nighttime urination. Had TDap in 2014      Care Team:   Neurology- Dr. Bell Nuno       Review of Systems   Pertinent positives and negative per HPI. All other systems  reviewed are negative for a Comprehensive ROS (10+).        Past Medical History:   Diagnosis Date    Essential hypertension 4/1/2016    Hypertension     Pneumonia Tania 10, 2014    Seizures [facility])         Social History     Socioeconomic History    Marital status: SINGLE     Spouse name: Not on file    Number of children: Not on file    Years of education: Not on file    Highest education level: Not on file   Occupational History    Not on file   Social Needs    Financial resource strain: Not on file    Food insecurity     Worry: Not on file     Inability: Not on file    Transportation needs     Medical: Not on file     Non-medical: Not on file   Tobacco Use    Smoking status: Never Smoker    Smokeless tobacco: Never Used   Substance and Sexual Activity    Alcohol use: No    Drug use: No    Sexual activity: Yes     Partners: Female     Birth control/protection: Pill   Lifestyle    Physical activity     Days per week: Not on file     Minutes per session: Not on file    Stress: Not on file   Relationships    Social connections     Talks on phone: Not on file     Gets together: Not on file     Attends [employer] service: Not on file     Active member of club or organization: Not on file     Attends meetings of clubs or organizations: Not on file     Relationship status: Not on file    Intimate partner violence     Fear of current or ex partner: Not on file     Emotionally abused: Not on file     Physically abused: Not on file     Forced sexual activity: Not on file   Other Topics Concern    Not on file   Social History Narrative    Not on file       Family History   Problem Relation Age of Onset    Hypertension Mother     Heart Disease Father     Hypertension Father     Seizures Daughter        Current Outpatient Medications   Medication Sig    losartan (COZAAR) 50 mg tablet Take  by mouth daily.  levETIRAcetam 1,000 mg tablet TAKE 1 TABLET BY MOUTH TWICE A DAY    olmesartan (BENICAR) 40 mg tablet Take 1 Tab by mouth daily.  hydroCHLOROthiazide (HYDRODIURIL) 25 mg tablet Take 1 Tab by mouth daily.  famotidine (PEPCID) 20 mg tablet Take 1 Tab by mouth two (2) times a day. No current facility-administered medications for this visit. Objective:     Vitals:    11/17/20 1542   BP: (!) 156/99   Pulse: (!) 54   Resp: 20   Temp: 98.7 °F (37.1 °C)   TempSrc: Temporal   SpO2: 99%   Weight: 238 lb 12.8 oz (108.3 kg)   Height: 5' 9.5\" (1.765 m)       Physical Examination:  General: Alert, cooperative, no distress, appears stated age. Obese  Eyes: Conjunctivae clear. Pupils equally round and reactive to light, Extraocular muscles intact. Ears: Normal external ear canals both ears. Tympanic membranes clear and mobile bilaterally   Nose: Nares normal. Septum midline. Mucosa normal. No drainage or sinus tenderness. Mouth/Throat: Lips, mucosa, and tongue normal. No oropharyngeal erythema. No tonsillar enlargement or exudate. Neck: Supple, symmetrical, trachea midline, no adenopathy. No thyroid enlargement/tenderness/nodules  Respiratory: Breathing comfortably, in no acute respiratory distress. Clear to auscultation bilaterally. Normal inspiratory and expiratory ratio. Cardiovascular: Regular rate and rhythm, S1, S2 normal, no murmur, click, rub or gallop.   -Extremities no edema. Pulses 2+ and symmetric radial and dorsalis pedis   Abdomen: Soft, non-tender, not distended. Bowel sounds normal. No masses or organomegaly.   MSK: Extremities normal appearing, atraumatic, no effusion. Gait steady and unassisted. Back symmetric, no curvature. Range of motion normal. No Costovertebral angle tenderness. Skin: Diffuse hyperkeratotic hyperpigmented skin of neck and hands and exposed skin  Lymph nodes: Cervical, supraclavicular nodes normal.  Neurologic: Cranial nerves II-XII intact. Strength 5/5 grossly. Sensation and reflexes normal throughout. Psychiatric: Affect appropriate. Mood euthymic. Thoughts logical. Speech volume and speed normal        No visits with results within 3 Month(s) from this visit. Latest known visit with results is:   Office Visit on 03/11/2019   Component Date Value Ref Range Status    Cholesterol, total 03/11/2019 181  100 - 199 mg/dL Final    Triglyceride 03/11/2019 313* 0 - 149 mg/dL Final    HDL Cholesterol 03/11/2019 39* >39 mg/dL Final    VLDL, calculated 03/11/2019 63* 5 - 40 mg/dL Final    LDL, calculated 03/11/2019 79  0 - 99 mg/dL Final    Hemoglobin A1c 03/11/2019 5.4  4.8 - 5.6 % Final    Comment:          Prediabetes: 5.7 - 6.4           Diabetes: >6.4           Glycemic control for adults with diabetes: <7.0      Estimated average glucose 03/11/2019 108  mg/dL Final    WBC 03/11/2019 7.9  3.4 - 10.8 x10E3/uL Final    RBC 03/11/2019 4.77  4.14 - 5.80 x10E6/uL Final    HGB 03/11/2019 14.2  13.0 - 17.7 g/dL Final    HCT 03/11/2019 40.2  37.5 - 51.0 % Final    MCV 03/11/2019 84  79 - 97 fL Final    MCH 03/11/2019 29.8  26.6 - 33.0 pg Final    MCHC 03/11/2019 35.3  31.5 - 35.7 g/dL Final    RDW 03/11/2019 14.5  12.3 - 15.4 % Final    PLATELET 37/51/2459 218  150 - 379 x10E3/uL Final    NEUTROPHILS 03/11/2019 55  Not Estab. % Final    Lymphocytes 03/11/2019 35  Not Estab. % Final    MONOCYTES 03/11/2019 8  Not Estab. % Final    EOSINOPHILS 03/11/2019 2  Not Estab. % Final    BASOPHILS 03/11/2019 0  Not Estab. % Final    ABS.  NEUTROPHILS 03/11/2019 4.3  1.4 - 7.0 x10E3/uL Final    Abs Lymphocytes 03/11/2019 2.8  0.7 - 3.1 x10E3/uL Final    ABS. MONOCYTES 03/11/2019 0.6  0.1 - 0.9 x10E3/uL Final    ABS. EOSINOPHILS 03/11/2019 0.2  0.0 - 0.4 x10E3/uL Final    ABS. BASOPHILS 03/11/2019 0.0  0.0 - 0.2 x10E3/uL Final    IMMATURE GRANULOCYTES 03/11/2019 0  Not Estab. % Final    ABS. IMM. GRANS. 03/11/2019 0.0  0.0 - 0.1 x10E3/uL Final    Glucose 03/11/2019 104* 65 - 99 mg/dL Final    BUN 03/11/2019 9  6 - 20 mg/dL Final    Creatinine 03/11/2019 0.94  0.76 - 1.27 mg/dL Final    GFR est non-AA 03/11/2019 105  >59 mL/min/1.73 Final    GFR est AA 03/11/2019 121  >59 mL/min/1.73 Final    BUN/Creatinine ratio 03/11/2019 10  9 - 20 Final    Sodium 03/11/2019 144  134 - 144 mmol/L Final    Potassium 03/11/2019 4.4  3.5 - 5.2 mmol/L Final    Chloride 03/11/2019 104  96 - 106 mmol/L Final    CO2 03/11/2019 23  20 - 29 mmol/L Final    Calcium 03/11/2019 9.4  8.7 - 10.2 mg/dL Final    Protein, total 03/11/2019 7.8  6.0 - 8.5 g/dL Final    Albumin 03/11/2019 4.7  3.5 - 5.5 g/dL Final    GLOBULIN, TOTAL 03/11/2019 3.1  1.5 - 4.5 g/dL Final    A-G Ratio 03/11/2019 1.5  1.2 - 2.2 Final    Bilirubin, total 03/11/2019 0.3  0.0 - 1.2 mg/dL Final    Alk. phosphatase 03/11/2019 102  39 - 117 IU/L Final    AST (SGOT) 03/11/2019 25  0 - 40 IU/L Final    ALT (SGPT) 03/11/2019 32  0 - 44 IU/L Final    INTERPRETATION 03/11/2019 Note   Final    Supplemental report is available. Assessment/ Plan:   Diagnoses and all orders for this visit:    1. Encounter for wellness examination    2. Essential hypertension  -     CBC WITH AUTOMATED DIFF; Future  -     METABOLIC PANEL, COMPREHENSIVE; Future  -     LIPID PANEL; Future  -     olmesartan (BENICAR) 40 mg tablet; Take 1 Tab by mouth daily. -     hydroCHLOROthiazide (HYDRODIURIL) 25 mg tablet; Take 1 Tab by mouth daily. 3. Seizure (HCC)  -     LEVETIRACETAM (KEPPRA); Future    4. Hypokalemia  -     potassium chloride (KLOR-CON) 10 mEq tablet; Take 1 Tab by mouth daily. 5. Other eczema    6. Severe obesity (BMI 35.0-39. 9) with comorbidity ([address]        For today's visit, I did the following:  · Reviewed PMH as listed in orders  · Refilled meds for chronic conditions, per orders  · Reviewed labs in detail or ordered lab  · Requested or reviewed prior medical records  Labs to eval end organ function and etiology of chronic/acute concerns. Relevant vaccine, cancer screening and other health maintenance reviewed and updated per orders. Blood pressure is elevated on olmesartan alone but K normalized off HCTZ. Restart HCTZ with potassium supplementDASH Diet recommended. Recheck in 2 weeks. Seizure disorder, recent breakthrough seizure. Follow up with neurologist. [address] level to check if therapeutic. Diet and lifestyle modification encouraged for weight loss and chronic disease prevention/ management. Negative depression screen. .   Follow up with specialists per routine. We discussed the expected course, resolution and complications of the diagnosis(es) in detail. Medication risks, benefits, costs, interactions, and alternatives were discussed as indicated. I advised him to contact the office if his condition worsens, changes or fails to improve as anticipated. He expressed understanding with the diagnosis(es) and plan. Educated patient on red flag symptoms to warrant return to clinic or emergency room visit. I have discussed the diagnosis with the patient and the intended plan as seen in the above orders. The patient has been offered or received an after-visit summary and questions were answered concerning future plans. I have discussed medication side effects and warnings with the patient as well. Follow-up and Dispositions    · Return in about 4 weeks (around 12/15/2020) for Follow Up blood pressure.          Signed,    Kevan Simpson MD  11/17/2020

## 2020-11-19 LAB
ALBUMIN SERPL-MCNC: 4.2 G/DL (ref 3.5–5)
ALBUMIN/GLOB SERPL: 1.1 {RATIO} (ref 1.1–2.2)
ALP SERPL-CCNC: 101 U/L (ref 45–117)
ALT SERPL-CCNC: 25 U/L (ref 12–78)
ANION GAP SERPL CALC-SCNC: 6 MMOL/L (ref 5–15)
AST SERPL-CCNC: 18 U/L (ref 15–37)
BASOPHILS # BLD: 0 K/UL (ref 0–0.1)
BASOPHILS NFR BLD: 0 % (ref 0–1)
BILIRUB SERPL-MCNC: 0.5 MG/DL (ref 0.2–1)
BUN SERPL-MCNC: 12 MG/DL (ref 6–20)
BUN/CREAT SERPL: 11 (ref 12–20)
CALCIUM SERPL-MCNC: 8.9 MG/DL (ref 8.5–10.1)
CHLORIDE SERPL-SCNC: 106 MMOL/L (ref 97–108)
CHOLEST SERPL-MCNC: 134 MG/DL
CO2 SERPL-SCNC: 28 MMOL/L (ref 21–32)
COMMENT, HOLDF: NORMAL
CREAT SERPL-MCNC: 1.09 MG/DL (ref 0.7–1.3)
DIFFERENTIAL METHOD BLD: NORMAL
EOSINOPHIL # BLD: 0.1 K/UL (ref 0–0.4)
EOSINOPHIL NFR BLD: 1 % (ref 0–7)
ERYTHROCYTE [DISTWIDTH] IN BLOOD BY AUTOMATED COUNT: 13.4 % (ref 11.5–14.5)
GLOBULIN SER CALC-MCNC: 4 G/DL (ref 2–4)
GLUCOSE SERPL-MCNC: 71 MG/DL (ref 65–100)
HCT VFR BLD AUTO: 41.9 % (ref 36.6–50.3)
HDLC SERPL-MCNC: 36 MG/DL
HDLC SERPL: 3.7 {RATIO} (ref 0–5)
HGB BLD-MCNC: 13.5 G/DL (ref 12.1–17)
IMM GRANULOCYTES # BLD AUTO: 0 K/UL (ref 0–0.04)
IMM GRANULOCYTES NFR BLD AUTO: 0 % (ref 0–0.5)
LDLC SERPL CALC-MCNC: 75.2 MG/DL (ref 0–100)
LEVETIRACETAM SERPL-MCNC: 4.1 UG/ML (ref 10–40)
LIPID PROFILE,FLP: NORMAL
LYMPHOCYTES # BLD: 2.9 K/UL (ref 0.8–3.5)
LYMPHOCYTES NFR BLD: 33 % (ref 12–49)
MCH RBC QN AUTO: 28.8 PG (ref 26–34)
MCHC RBC AUTO-ENTMCNC: 32.2 G/DL (ref 30–36.5)
MCV RBC AUTO: 89.3 FL (ref 80–99)
MONOCYTES # BLD: 0.6 K/UL (ref 0–1)
MONOCYTES NFR BLD: 6 % (ref 5–13)
NEUTS SEG # BLD: 5.4 K/UL (ref 1.8–8)
NEUTS SEG NFR BLD: 60 % (ref 32–75)
NRBC # BLD: 0 K/UL (ref 0–0.01)
NRBC BLD-RTO: 0 PER 100 WBC
PLATELET # BLD AUTO: 302 K/UL (ref 150–400)
PMV BLD AUTO: 10.8 FL (ref 8.9–12.9)
POTASSIUM SERPL-SCNC: 4 MMOL/L (ref 3.5–5.1)
PROT SERPL-MCNC: 8.2 G/DL (ref 6.4–8.2)
RBC # BLD AUTO: 4.69 M/UL (ref 4.1–5.7)
SAMPLES BEING HELD,HOLD: NORMAL
SODIUM SERPL-SCNC: 140 MMOL/L (ref 136–145)
TRIGL SERPL-MCNC: 114 MG/DL (ref ?–150)
VLDLC SERPL CALC-MCNC: 22.8 MG/DL
WBC # BLD AUTO: 9.1 K/UL (ref 4.1–11.1)

## 2020-11-20 RX ORDER — POTASSIUM CHLORIDE 750 MG/1
10 TABLET, EXTENDED RELEASE ORAL DAILY
Qty: 90 TAB | Refills: 1 | Status: SHIPPED | OUTPATIENT
Start: 2020-11-20 | End: 2021-10-14 | Stop reason: SDUPTHER

## 2020-11-20 RX ORDER — OLMESARTAN MEDOXOMIL 40 MG/1
40 TABLET ORAL DAILY
Qty: 90 TAB | Refills: 1 | Status: SHIPPED | OUTPATIENT
Start: 2020-11-20 | End: 2021-10-14

## 2020-11-20 RX ORDER — HYDROCHLOROTHIAZIDE 25 MG/1
25 TABLET ORAL DAILY
Qty: 90 TAB | Refills: 1 | Status: SHIPPED | OUTPATIENT
Start: 2020-11-20 | End: 2021-10-14 | Stop reason: SDUPTHER

## 2020-11-20 NOTE — PATIENT INSTRUCTIONS
Weight Loss Tips: 
Remember this is like a part time job so your motivation and commitment is key to your success. Mindset Weight loss like any other behavior change starts in your mind. Think hard about what your motivates you to lose weight then meditate on that. Remind yourself of your motivation 
with phone alarms, scheduled meditation time, vision board, journal- just to name a few ideas. Have realistic goals. We expect with diligent healthy diet and physical activity you can lose 5% of your body weight in 3 
months. Wt in lbs x 0.05 = #lbs you should lose in 3 months. Make food and activity changes with a goal of CONSISTENCY not perfection. Food Start eating differently. Most of your weight loss and gain is from what you eat. Use small plates only Drink 2 liters (1/2 gallon) of water every day HALF of every meal should be fruit or vegetables Try meal prepping on Sunday (or your day off) with new different vegetables. Consider meal prep service such as Cleaneatz.com, wepremeals. com Replace soda with diet soda or other zero sugar drinks (selter water just fine) Consider using the SmartwareToday.com joceline for calorie counting. Goal 2475-3982 calories per day Activity Staying physically active will help you lose more weight and can help you get over the plateau when you weight just 
won't change any more with diet. Start exercise at least 5 days per week for 40 minutes. Consider DGIT training joceline for home exercises. You can start with walking. I suggest walking at a speed of at least 3.5-4.5mph to for the weight loss benefit. Increase your speed or distance every 2 weeks. Do some slow stretching daily of legs, arms and back. Consider adding weight training with light weights at home or at the gym. See a doctor or a physical training for 
instructions in order to avoid injuries from doing muscle training incorrectly.  
Free fitness program in [place]: [employer] org/program/fitness-warriors/ DASH Diet: Care Instructions Your Care Instructions The DASH diet is an eating plan that can help lower your blood pressure. DASH stands for Dietary Approaches to Stop Hypertension. Hypertension is high blood pressure. The DASH diet focuses on eating foods that are high in calcium, potassium, and magnesium. These nutrients can lower blood pressure. The foods that are highest in these nutrients are fruits, vegetables, low-fat dairy products, nuts, seeds, and legumes. But taking calcium, potassium, and magnesium supplements instead of eating foods that are high in those nutrients does not have the same effect. The DASH diet also includes whole grains, fish, and poultry. The DASH diet is one of several lifestyle changes your doctor may recommend to lower your high blood pressure. Your doctor may also want you to decrease the amount of sodium in your diet. Lowering sodium while following the DASH diet can lower blood pressure even further than just the DASH diet alone. Follow-up care is a key part of your treatment and safety. Be sure to make and go to all appointments, and call your doctor if you are having problems. It's also a good idea to know your test results and keep a list of the medicines you take. How can you care for yourself at home? Following the DASH diet · Eat 4 to 5 servings of fruit each day. A serving is 1 medium-sized piece of fruit, ½ cup chopped or canned fruit, 1/4 cup dried fruit, or 4 ounces (½ cup) of fruit juice. Choose fruit more often than fruit juice. · Eat 4 to 5 servings of vegetables each day. A serving is 1 cup of lettuce or raw leafy vegetables, ½ cup of chopped or cooked vegetables, or 4 ounces (½ cup) of vegetable juice. Choose vegetables more often than vegetable juice. · Get 2 to 3 servings of low-fat and fat-free dairy each day. A serving is 8 ounces of milk, 1 cup of yogurt, or 1 ½ ounces of cheese. · Eat 6 to 8 servings of grains each day. A serving is 1 slice of bread, 1 ounce of dry cereal, or ½ cup of cooked rice, pasta, or cooked cereal. Try to choose whole-grain products as much as possible. · Limit lean meat, poultry, and fish to 2 servings each day. A serving is 3 ounces, about the size of a deck of cards. · Eat 4 to 5 servings of nuts, seeds, and legumes (cooked dried beans, lentils, and split peas) each week. A serving is 1/3 cup of nuts, 2 tablespoons of seeds, or ½ cup of cooked beans or peas. · Limit fats and oils to 2 to 3 servings each day. A serving is 1 teaspoon of vegetable oil or 2 tablespoons of salad dressing. · Limit sweets and added sugars to 5 servings or less a week. A serving is 1 tablespoon jelly or jam, ½ cup sorbet, or 1 cup of lemonade. · Eat less than 2,300 milligrams (mg) of sodium a day. If you limit your sodium to 1,500 mg a day, you can lower your blood pressure even more. Tips for success · Start small. Do not try to make dramatic changes to your diet all at once. You might feel that you are missing out on your favorite foods and then be more likely to not follow the plan. Make small changes, and stick with them. Once those changes become habit, add a few more changes. · Try some of the following: ? Make it a goal to eat a fruit or vegetable at every meal and at snacks. This will make it easy to get the recommended amount of fruits and vegetables each day. ? Try yogurt topped with fruit and nuts for a snack or healthy dessert. ? Add lettuce, tomato, cucumber, and onion to sandwiches. ? Combine a ready-made pizza crust with low-fat mozzarella cheese and lots of vegetable toppings. Try using tomatoes, squash, spinach, broccoli, carrots, cauliflower, and onions. ? Have a variety of cut-up vegetables with a low-fat dip as an appetizer instead of chips and dip. ? Sprinkle sunflower seeds or chopped almonds over salads.  Or try adding chopped walnuts or almonds to cooked vegetables. ? Try some vegetarian meals using beans and peas. Add garbanzo or kidney beans to salads. Make burritos and tacos with mashed mccall beans or black beans. Where can you learn more? Go to http://www.gray.com/ Enter X650 in the search box to learn more about \"DASH Diet: Care Instructions. \" Current as of: December 16, 2019               Content Version: 12.6 © 0228-4758 [employer]. Care instructions adapted under license by [employer] (which disclaims liability or warranty for this information). If you have questions about a medical condition or this instruction, always ask your healthcare professional. [address] any warranty or liability for your use of this information. A Healthy Lifestyle: Care Instructions Your Care Instructions A healthy lifestyle can help you feel good, stay at a healthy weight, and have plenty of energy for both work and play. A healthy lifestyle is something you can share with your whole family. A healthy lifestyle also can lower your risk for serious health problems, such as high blood pressure, heart disease, and diabetes. You can follow a few steps listed below to improve your health and the health of your family. Follow-up care is a key part of your treatment and safety. Be sure to make and go to all appointments, and call your doctor if you are having problems. It's also a good idea to know your test results and keep a list of the medicines you take. How can you care for yourself at home? · Do not eat too much sugar, fat, or fast foods. You can still have dessert and treats now and then. The goal is moderation. · Start small to improve your eating habits. Pay attention to portion sizes, drink less juice and soda pop, and eat more fruits and vegetables. ? Eat a healthy amount of food.  A 3-ounce serving of meat, for example, is about the size of a deck of cards. Fill the rest of your plate with vegetables and whole grains. ? Limit the amount of soda and sports drinks you have every day. Drink more water when you are thirsty. ? Eat at least 5 servings of fruits and vegetables every day. It may seem like a lot, but it is not hard to reach this goal. A serving or helping is 1 piece of fruit, 1 cup of vegetables, or 2 cups of leafy, raw vegetables. Have an apple or some carrot sticks as an afternoon snack instead of a candy bar. Try to have fruits and/or vegetables at every meal. 
· Make exercise part of your daily routine. You may want to start with simple activities, such as walking, bicycling, or slow swimming. Try to be active 30 to 60 minutes every day. You do not need to do all 30 to 60 minutes all at once. For example, you can exercise 3 times a day for 10 or 20 minutes. Moderate exercise is safe for most people, but it is always a good idea to talk to your doctor before starting an exercise program. 
· Keep moving. [employer], work in the garden, or [employer]. Take the stairs instead of the elevator at work. · If you smoke, quit. People who smoke have an increased risk for heart attack, stroke, cancer, and other lung illnesses. Quitting is hard, but there are ways to boost your chance of quitting tobacco for good. ? Use nicotine gum, patches, or lozenges. ? Ask your doctor about stop-smoking programs and medicines. ? Keep trying. In addition to reducing your risk of diseases in the future, you will notice some benefits soon after you stop using tobacco. If you have shortness of breath or asthma symptoms, they will likely get better within a few weeks after you quit. · Limit how much alcohol you drink. Moderate amounts of alcohol (up to 2 drinks a day for men, 1 drink a day for women) are okay. But drinking too much can lead to liver problems, high blood pressure, and other health problems. Family health If you have a family, there are many things you can do together to improve your health. · Eat meals together as a family as often as possible. · Eat healthy foods. This includes fruits, vegetables, lean meats and dairy, and whole grains. · Include your family in your fitness plan. Most people think of activities such as jogging or tennis as the way to fitness, but there are many ways you and your family can be more active. Anything that makes you breathe hard and gets your heart pumping is exercise. Here are some tips: 
? Walk to do errands or to take your child to school or the bus. 
? Go for a family bike ride after dinner instead of watching TV. Where can you learn more? Go to http://www.gray.com/ Enter G544 in the search box to learn more about \"A Healthy Lifestyle: Care Instructions. \" Current as of: January 31, 2020               Content Version: 12.6 © 8542-5479 [employer]. Care instructions adapted under license by [employer] (which disclaims liability or warranty for this information). If you have questions about a medical condition or this instruction, always ask your healthcare professional. [place] [address] any warranty or liability for your use of this information.

## 2020-11-23 NOTE — PROGRESS NOTES
Notify Patient:  Your keppra level is low. Please share this result with your neurologist to discuss medication changes.

## 2020-11-23 NOTE — PROGRESS NOTES
Called spoke to pt. Two pt id confirmed   Informed pt per DR. Nitin arzola level is low. Please share this result with your neurologist to discuss medication changes. chemistry panel is normal with normal kidney and liver function. blood counts are normal. You do not have anemia   cholesterol level is normal  Pt would like labs sent to neurologist ( [facility])  Labs will be faxed. Pt verbalized understanding of information discussed w/ no further questions at this time.

## 2021-10-14 ENCOUNTER — OFFICE VISIT (OUTPATIENT)
Dept: FAMILY MEDICINE CLINIC | Age: 36
End: 2021-10-14
Payer: MEDICAID

## 2021-10-14 VITALS
DIASTOLIC BLOOD PRESSURE: 94 MMHG | WEIGHT: 239.4 LBS | RESPIRATION RATE: 18 BRPM | BODY MASS INDEX: 34.27 KG/M2 | SYSTOLIC BLOOD PRESSURE: 155 MMHG | TEMPERATURE: 97.5 F | HEART RATE: 57 BPM | HEIGHT: 70 IN | OXYGEN SATURATION: 99 %

## 2021-10-14 DIAGNOSIS — G40.909 SEIZURE DISORDER (HCC): Primary | ICD-10-CM

## 2021-10-14 DIAGNOSIS — L30.8 OTHER ECZEMA: ICD-10-CM

## 2021-10-14 DIAGNOSIS — E87.6 HYPOKALEMIA: ICD-10-CM

## 2021-10-14 DIAGNOSIS — I10 ESSENTIAL HYPERTENSION: ICD-10-CM

## 2021-10-14 PROCEDURE — 99214 OFFICE O/P EST MOD 30 MIN: CPT | Performed by: NURSE PRACTITIONER

## 2021-10-14 RX ORDER — POTASSIUM CHLORIDE 750 MG/1
10 TABLET, EXTENDED RELEASE ORAL DAILY
Qty: 90 TABLET | Refills: 1 | Status: SHIPPED | OUTPATIENT
Start: 2021-10-14

## 2021-10-14 RX ORDER — LOSARTAN POTASSIUM 50 MG/1
50 TABLET ORAL DAILY
Qty: 90 TABLET | Refills: 1 | Status: SHIPPED | OUTPATIENT
Start: 2021-10-14

## 2021-10-14 RX ORDER — HYDROCHLOROTHIAZIDE 25 MG/1
25 TABLET ORAL DAILY
Qty: 90 TABLET | Refills: 1 | Status: SHIPPED | OUTPATIENT
Start: 2021-10-14 | End: 2022-08-21
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
## 2021-10-14 NOTE — PATIENT INSTRUCTIONS
1. Begin checking blood pressure twice a day for the next week and record results. Please contact our office with those results so we may review and adjust her medication if needed. Home Blood Pressure Test: About This Test  What is it? A home blood pressure test allows you to keep track of your blood pressure at home. Blood pressure is a measure of the force of blood against the walls of your arteries. Blood pressure readings include two numbers, such as 130/80 (say \"130 over 80\"). The first number is the systolic pressure. The second number is the diastolic pressure. Why is this test done? You may do this test at home to:  · Find out if you have high blood pressure. · Track your blood pressure if you have high blood pressure. · Track how well medicine is working to reduce high blood pressure. · Check how lifestyle changes, such as weight loss and exercise, are affecting blood pressure. How do you prepare for the test?  For at least 30 minutes before you take your blood pressure, don't exercise, drink caffeine, or smoke. Empty your bladder before the test. Sit quietly with your back straight and both feet on the floor for at least 5 minutes. This helps you take your blood pressure while you feel comfortable and relaxed. How is the test done? · If your doctor recommends it, take your blood pressure twice a day. Take it in the morning and evening. · Sit with your arm slightly bent and resting on a table so that your upper arm is at the same level as your heart. · Use the same arm each time you take your blood pressure. · Place the blood pressure cuff on the bare skin of your upper arm. You may have to roll up your sleeve, remove your arm from the sleeve, or take your shirt off. · Wrap the blood pressure cuff around your upper arm so that the lower edge of the cuff is about 1 inch above the bend of your elbow. · Do not move, talk, or text while you take your blood pressure.   Follow the instructions that came with your blood pressure monitor. They might be different from the following. · Press the on/off button on the automatic monitor. Then you may need to wait until the screen says the monitor is ready. · Press the start button. The cuff will inflate and deflate by itself. · Your blood pressure numbers will appear on the screen. · Wait one minute and take your blood pressure again. · If your monitor does not automatically save your numbers, write them in your log book, along with the date and time. Follow-up care is a key part of your treatment and safety. Be sure to make and go to all appointments, and call your doctor if you are having problems. It's also a good idea to keep a list of the medicines you take. Where can you learn more? Go to http://www.vizcarra.com/  Enter C427 in the search box to learn more about \"Home Blood Pressure Test: About This Test.\"  Current as of: April 29, 2021               Content Version: 13.0  © 2006-2021 Healthwise, Incorporated. Care instructions adapted under license by [employer] (which disclaims liability or warranty for this information). If you have questions about a medical condition or this instruction, always ask your healthcare professional. [place] [address] any warranty or liability for your use of this information.

## 2021-10-14 NOTE — PROGRESS NOTES
Chief Complaint   Patient presents with   [facility] Establish Care    Medication Refill       1. Have you been to the ER, urgent care clinic since your last visit? Hospitalized since your last visit? Yes When: [id number] Where: [address] Reason for visit: dehydration    2. Have you seen or consulted any other health care providers outside of the [address] [place] [address] since your last visit? Include any pap smears or colon screening. No    3. For patients over 45: Has the patient had a colonoscopy? Yes, but HM not satisfied. Rooming MA/LPN to request most recent results         3 most recent PHQ Screens 10/14/2021   PHQ Not Done -   Little interest or pleasure in doing things Not at all   Feeling down, depressed, irritable, or hopeless Not at all   Total Score PHQ 2 0     Abuse Screening Questionnaire 4/12/2019   Do you ever feel afraid of your partner? N   Are you in a relationship with someone who physically or mentally threatens you? N   Is it safe for you to go home? Y       No flowsheet data found. No flowsheet data found.   Health Maintenance Due   Topic Date Due    Hepatitis C Screening  Never done    COVID-19 Vaccine (1) Never done    Flu Vaccine (1) 09/01/2021

## 2021-11-18 ENCOUNTER — OFFICE VISIT (OUTPATIENT)
Dept: NEUROLOGY | Age: 36
End: 2021-11-18
Payer: MEDICAID

## 2021-11-18 VITALS
OXYGEN SATURATION: 99 % | RESPIRATION RATE: 16 BRPM | TEMPERATURE: 97.9 F | HEART RATE: 63 BPM | HEIGHT: 70 IN | WEIGHT: 235 LBS | SYSTOLIC BLOOD PRESSURE: 135 MMHG | DIASTOLIC BLOOD PRESSURE: 89 MMHG | BODY MASS INDEX: 33.64 KG/M2

## 2021-11-18 DIAGNOSIS — R56.9 SEIZURE (HCC): ICD-10-CM

## 2021-11-18 DIAGNOSIS — G40.209 COMPLEX PARTIAL SEIZURES EVOLVING TO GENERALIZED TONIC-CLONIC SEIZURES (HCC): Primary | ICD-10-CM

## 2021-11-18 PROCEDURE — 99213 OFFICE O/P EST LOW 20 MIN: CPT | Performed by: SPECIALIST

## 2021-11-18 RX ORDER — LEVETIRACETAM 1000 MG/1
TABLET ORAL
Qty: 180 TABLET | Refills: 3 | Status: SHIPPED | OUTPATIENT
Start: 2021-11-18

## 2021-11-18 NOTE — PROGRESS NOTES
Neurology Consult      Subjective:      Lennie Francois is a 38 y.o. male who comes in today with his wife. Has what I have felt to be complex partial seizures with secondary generalization. Is on Keppra 1000 mg twice daily. Compliance has been an issue in the past and apparently recently as well. Case in point presented to the [facility] ER on 10- with so-called breakthrough seizure. Denied missing any medicine. His lab work showed the BMP was normal, Keppra level of less than 2 therapeutic range 6 to 46mcg/cc. .  Ethanol level less than 5 CBC with white blood count 14.3, chest x-ray positive for pulmonary vascular congestion. Treatment in the ER included loading with 2 g of IV Keppra. Ativan 1 mg given IV. He is otherwise an annual revisit here. Did not site any new medical or surgical history. Appear to be strictly baseline in terms of evaluation today. No new complaints and otherwise no issues. Otherwise his exam and history appear to be strictly baseline for me. Revisit 1 year. Current Outpatient Medications   Medication Sig Dispense Refill    levETIRAcetam 1,000 mg tablet TAKE 1 TABLET BY MOUTH TWICE A  Tablet 3    hydroCHLOROthiazide (HYDRODIURIL) 25 mg tablet Take 1 Tablet by mouth daily. 90 Tablet 1    potassium chloride (KLOR-CON) 10 mEq tablet Take 1 Tablet by mouth daily. 90 Tablet 1    losartan (COZAAR) 50 mg tablet Take 1 Tablet by mouth daily. (Patient not taking: Reported on 11/18/2021) 90 Tablet 1    famotidine (PEPCID) 20 mg tablet Take 1 Tab by mouth two (2) times a day. (Patient not taking: Reported on 10/14/2021) 60 Tab 5      Allergies   Allergen Reactions    Amlodipine Swelling     Past Medical History:   Diagnosis Date    Essential hypertension 4/1/2016    Hypertension     Pneumonia Tania 10, 2014    Seizures [facility])       No past surgical history on file.    Social History     Socioeconomic History    Marital status: SINGLE     Spouse name: Not on file    Number of children: Not on file    Years of education: Not on file    Highest education level: Not on file   Occupational History    Not on file   Tobacco Use    Smoking status: Never Smoker    Smokeless tobacco: Never Used   Substance and Sexual Activity    Alcohol use: No    Drug use: No    Sexual activity: Yes     Partners: Female     Birth control/protection: Pill   Other Topics Concern    Not on file   Social History Narrative    Not on file     Social Determinants of Health     Financial Resource Strain:     Difficulty of Paying Living Expenses: Not on file   Food Insecurity:     Worried About Running Out of Food in the Last Year: Not on file    Jodi of Food in the Last Year: Not on file   Transportation Needs:     Lack of Transportation (Medical): Not on file    Lack of Transportation (Non-Medical):  Not on file   Physical Activity:     Days of Exercise per Week: Not on file    Minutes of Exercise per Session: Not on file   Stress:     Feeling of Stress : Not on file   Social Connections:     Frequency of Communication with Friends and Family: Not on file    Frequency of Social Gatherings with Friends and Family: Not on file    Attends Mandaeism Services: Not on file    Active Member of [address] [place] [address] [employer] or Organizations: Not on file    Attends Club or Organization Meetings: Not on file    Marital Status: Not on file   Intimate Partner Violence:     Fear of Current or Ex-Partner: Not on file    Emotionally Abused: Not on file    Physically Abused: Not on file    Sexually Abused: Not on file   Housing Stability:     Unable to Pay for Housing in the Last Year: Not on file    Number of [place] in the Last Year: Not on file    Unstable Housing in the Last Year: Not on file      Family History   Problem Relation Age of Onset    Hypertension Mother     Heart Disease Father     Hypertension Father     Seizures Daughter       Visit Vitals  /89 (BP 1 Location: Left upper arm, BP Patient Position: Sitting)   Pulse 63   Temp 97.9 °F (36.6 °C) (Temporal)   Resp 16   Ht 5' 9.5\" (1.765 m)   Wt 106.6 kg (235 lb)   SpO2 99%   BMI 34.21 kg/m²        Review of Systems:   A comprehensive review of systems was negative except for that written in the HPI. Neuro Exam:     Appearance: The patient is well developed, well nourished, provides a partial coherent history and is in no acute distress. Mental Status: Oriented to time, place and person. Mood and affect appropriate. Cranial Nerves:   Intact visual fields. Fundi are benign. SWAPNA, EOM's full, no nystagmus, no ptosis. Facial sensation is normal. Corneal reflexes are intact. Facial movement is symmetric. Hearing is normal bilaterally. Palate is midline with normal sternocleidomastoid and trapezius muscles are normal. Tongue is midline. Motor:  5/5 strength in upper and lower proximal and distal muscles. Normal bulk and tone. No fasciculations. Reflexes:   Deep tendon reflexes 2+/4 and symmetrical.   Sensory:   Normal to touch, pinprick and vibration. Gait:  Normal gait. Tremor:   No tremor noted. Cerebellar:  No cerebellar signs present. Neurovascular:  Normal heart sounds and regular rhythm, peripheral pulses intact, and no carotid bruits. Assessment:   Complex partial seizures evolving to generalized seizures. Will issue renewal on his levetiracetam at 1000 mg twice daily. Strongly suggest staying compliant with medicine, getting good sleep and minimizing stress. Revisit in 1 year. Made no new discoveries today other than the ER encounter at [facility] on October 16, 2021 referenced above. Plan:   Revisit 1 year.   Signed by :  Tera Lee MD

## 2021-11-18 NOTE — PATIENT INSTRUCTIONS
Patient history viewed patient examined. Will renew medicine and stress compliance with medicine get good sleep minimize stress. Revisit 1 year.

## 2021-11-18 NOTE — LETTER
11/18/2021    Patient: Randa Syed   YOB: 1985   Date of Visit: 11/18/2021     Gloria Santiago MD  [address] [place] [address]  Via Fax: [phone]    Dear Gloria Santiago MD,      Thank you for referring Mr. Randa Syed to [facility] for evaluation. My notes for this consultation are attached. If you have questions, please do not hesitate to call me. I look forward to following your patient along with you.       Sincerely,    Ho Navarrete MD

## 2022-03-18 PROBLEM — K21.9 GASTROESOPHAGEAL REFLUX DISEASE WITHOUT ESOPHAGITIS: Status: ACTIVE | Noted: 2017-06-19

## 2022-03-19 PROBLEM — I10 ESSENTIAL HYPERTENSION: Status: ACTIVE | Noted: 2017-06-19

## 2022-03-19 PROBLEM — E66.01 SEVERE OBESITY (BMI 35.0-39.9) WITH COMORBIDITY (HCC): Status: ACTIVE | Noted: 2018-04-11

## 2022-03-20 PROBLEM — L30.9 ECZEMA: Status: ACTIVE | Noted: 2018-04-11

## 2022-05-16 ENCOUNTER — TELEPHONE (OUTPATIENT)
Dept: FAMILY MEDICINE CLINIC | Age: 37
End: 2022-05-16

## 2022-05-16 NOTE — TELEPHONE ENCOUNTER
Attempted to call patient to schedule follow up visit. Call went straight to recording for [employer], stating that a number had to be used monthly to remain active and to enter account number.

## 2022-10-12 NOTE — PROGRESS NOTES
Notify pt his potassium/labs are perfect.  No change to current plan Detail Level: Generalized Detail Level: Detailed

## 2022-11-28 ENCOUNTER — OFFICE VISIT (OUTPATIENT)
Dept: NEUROLOGY | Age: 37
End: 2022-11-28
Payer: MEDICAID

## 2022-11-28 VITALS
OXYGEN SATURATION: 96 % | DIASTOLIC BLOOD PRESSURE: 90 MMHG | RESPIRATION RATE: 18 BRPM | SYSTOLIC BLOOD PRESSURE: 142 MMHG | HEIGHT: 69 IN | HEART RATE: 55 BPM | BODY MASS INDEX: 34.8 KG/M2 | WEIGHT: 235 LBS

## 2022-11-28 DIAGNOSIS — E87.6 HYPOKALEMIA: ICD-10-CM

## 2022-11-28 DIAGNOSIS — G40.209 COMPLEX PARTIAL SEIZURE EVOLVING TO GENERALIZED SEIZURE (HCC): Primary | ICD-10-CM

## 2022-11-28 DIAGNOSIS — R56.9 SEIZURE (HCC): ICD-10-CM

## 2022-11-28 PROCEDURE — 3074F SYST BP LT 130 MM HG: CPT | Performed by: SPECIALIST

## 2022-11-28 PROCEDURE — 99213 OFFICE O/P EST LOW 20 MIN: CPT | Performed by: SPECIALIST

## 2022-11-28 PROCEDURE — 3078F DIAST BP <80 MM HG: CPT | Performed by: SPECIALIST

## 2022-11-28 RX ORDER — LEVETIRACETAM 1000 MG/1
TABLET ORAL
Qty: 180 TABLET | Refills: 3 | Status: SHIPPED | OUTPATIENT
Start: 2022-11-28

## 2022-11-28 RX ORDER — POTASSIUM CHLORIDE 750 MG/1
10 TABLET, EXTENDED RELEASE ORAL DAILY
Qty: 90 TABLET | Refills: 1 | Status: SHIPPED | OUTPATIENT
Start: 2022-11-28

## 2022-11-28 NOTE — LETTER
11/28/2022    Patient: Courtney Bahena   YOB: 1985   Date of Visit: 11/28/2022     Niki Diaz NP  [facility] [phone]  Via In Basket    Dear Niki Diaz NP,      Thank you for referring Mr. Courtney Bahena to [facility] for evaluation. My notes for this consultation are attached. If you have questions, please do not hesitate to call me. I look forward to following your patient along with you.       Sincerely,    Flower Grover MD

## 2022-11-28 NOTE — PATIENT INSTRUCTIONS
Patient history reviewed and patient examined. Recent seizure and will check an updated level accordingly. Strong suggestions to stay of course compliant with medicine at all times and that includes taking it on time in addition to getting proper sleep and minimizing stress. Revisit in 1 year. Does not drive so that is not an issue.

## 2022-11-28 NOTE — PROGRESS NOTES
Neurology Consult      Subjective:      Maki Paul is a 43 y.o. male who comes in today on an annual revisit for complex partial seizures evolving to generalized tonic-clonic by type. Had a seizure in January 2022 and said he had another seizure on November 16 but this is not documented in any encounter including media that I have. Says he takes his medicine on time but previous seizure events and labs would suggest otherwise. He does not drive and depends on an assisted driving program to get himself from [place] to here. Out of principal I will get an updated Keppra level and go from there. Medicine renewed by request at [address] 1000 mg twice daily. Reinforced the issue of getting adequate sleep minimizing stress and yes staying compliant with medicine and on time. Revisit 1 year. Current Outpatient Medications   Medication Sig Dispense Refill    potassium chloride (KLOR-CON M10) 10 mEq tablet Take 1 Tablet by mouth daily. 90 Tablet 1    levETIRAcetam 1,000 mg tablet TAKE 1 TABLET BY MOUTH TWICE A DAY  Indications: Complex partial seizures evolving to generalized tonic-clonic 180 Tablet 3    hydroCHLOROthiazide (HYDRODIURIL) 25 mg tablet TAKE 1 TABLET BY MOUTH EVERY DAY 30 Tablet 0    losartan (COZAAR) 50 mg tablet Take 1 Tablet by mouth daily. (Patient not taking: No sig reported) 90 Tablet 1    famotidine (PEPCID) 20 mg tablet Take 1 Tab by mouth two (2) times a day. (Patient not taking: No sig reported) 60 Tab 5      Allergies   Allergen Reactions    Amlodipine Swelling     Past Medical History:   Diagnosis Date    Essential hypertension 4/1/2016    Hypertension     Pneumonia Tania 10, 2014    Seizures [facility])       No past surgical history on file.    Social History     Socioeconomic History    Marital status: SINGLE     Spouse name: Not on file    Number of children: Not on file    Years of education: Not on file    Highest education level: Not on file   Occupational History    Not on file Tobacco Use    Smoking status: Never    Smokeless tobacco: Never   Substance and Sexual Activity    Alcohol use: No    Drug use: No    Sexual activity: Yes     Partners: Female     Birth control/protection: Pill   Other Topics Concern    Not on file   Social History Narrative    Not on file     Social Determinants of Health     Financial Resource Strain: Not on file   Food Insecurity: Not on file   Transportation Needs: Not on file   Physical Activity: Not on file   Stress: Not on file   Social Connections: Not on file   Intimate Partner Violence: Not on file   Housing Stability: Not on file      Family History   Problem Relation Age of Onset    Hypertension Mother     Heart Disease Father     Hypertension Father     Seizures Daughter       Visit Vitals  BP (!) 142/90 (BP 1 Location: Left upper arm, BP Patient Position: Sitting, BP Cuff Size: Adult long)   Pulse (!) 55   Resp 18   Ht 5' 9\" (1.753 m)   Wt 106.6 kg (235 lb)   SpO2 96%   BMI 34.70 kg/m²        Review of Systems:   A comprehensive review of systems was negative except for that written in the HPI. Neuro Exam:     Appearance: The patient is well developed, well nourished, provides a coherent history and is in no acute distress. Mental Status: Oriented to time, place and person. Mood and affect appropriate. Cranial Nerves:   Intact visual fields. Fundi are benign. SWAPNA, EOM's full, no nystagmus, no ptosis. Facial sensation is normal. Corneal reflexes are intact. Facial movement is symmetric. Hearing is normal bilaterally. Palate is midline with normal sternocleidomastoid and trapezius muscles are normal. Tongue is midline. Motor:  5/5 strength in upper and lower proximal and distal muscles. Normal bulk and tone. No fasciculations. Reflexes:   Deep tendon reflexes 2+/4 and symmetrical.   Sensory:   Normal to touch, pinprick and vibration. Gait:  Normal gait. Tremor:   No tremor noted. Cerebellar:  No cerebellar signs present. Neurovascular:  Normal heart sounds and regular rhythm, peripheral pulses intact, and no carotid bruits. Assessment:   Complex partial seizures evolving to generalized tonic-clonic. Recommend continuation of Keppra at 1000 mg twice daily. We will check updated level accordingly. He does not drive so that is not an issue. Further suggestions could follow. Revisit 1 year. Plan:   Revisit 1 year.   Signed by :  Haider Preciado MD

## 2022-12-05 ENCOUNTER — OFFICE VISIT (OUTPATIENT)
Dept: FAMILY MEDICINE CLINIC | Age: 37
End: 2022-12-05
Payer: MEDICAID

## 2022-12-05 VITALS
HEIGHT: 69 IN | DIASTOLIC BLOOD PRESSURE: 96 MMHG | WEIGHT: 218.2 LBS | SYSTOLIC BLOOD PRESSURE: 150 MMHG | OXYGEN SATURATION: 99 % | HEART RATE: 56 BPM | TEMPERATURE: 97.6 F | BODY MASS INDEX: 32.32 KG/M2 | RESPIRATION RATE: 16 BRPM

## 2022-12-05 DIAGNOSIS — I10 UNCONTROLLED HYPERTENSION: ICD-10-CM

## 2022-12-05 DIAGNOSIS — G40.909 SEIZURE DISORDER (HCC): Primary | ICD-10-CM

## 2022-12-05 DIAGNOSIS — Z11.59 NEED FOR HEPATITIS C SCREENING TEST: ICD-10-CM

## 2022-12-05 PROCEDURE — 3078F DIAST BP <80 MM HG: CPT | Performed by: NURSE PRACTITIONER

## 2022-12-05 PROCEDURE — 99213 OFFICE O/P EST LOW 20 MIN: CPT | Performed by: NURSE PRACTITIONER

## 2022-12-05 PROCEDURE — 3074F SYST BP LT 130 MM HG: CPT | Performed by: NURSE PRACTITIONER

## 2022-12-05 RX ORDER — LOSARTAN POTASSIUM AND HYDROCHLOROTHIAZIDE 25; 100 MG/1; MG/1
1 TABLET ORAL DAILY
Qty: 90 TABLET | Refills: 1 | Status: SHIPPED | OUTPATIENT
Start: 2022-12-05

## 2022-12-05 NOTE — PROGRESS NOTES
Chief Complaint   Patient presents with    Hypertension         1. \"Have you been to the ER, urgent care clinic since your last visit? Hospitalized since your last visit? \" Yes in chart    2. \"Have you seen or consulted any other health care providers outside of the [address] [place] [address] since your last visit? \" No     3. For patients over 45: Has the patient had a colonoscopy?  NA - based on age       1 most recent PHQ Screens 12/5/2022   PHQ Not Done -   Little interest or pleasure in doing things Not at all   Feeling down, depressed, irritable, or hopeless Not at all   Total Score PHQ 2 0       Health Maintenance Due   Topic Date Due    Hepatitis C Screening  Never done    COVID-19 Vaccine (1) Never done    Flu Vaccine (1) 08/01/2022

## 2022-12-05 NOTE — PROGRESS NOTES
[facility] ER Note     Jocy Cordova (: 1985) is a 43 y.o. male, established patient, here for evaluation of the following chief complaint(s):  Hypertension       ASSESSMENT/PLAN:  1. Seizure disorder [facility])  Comments:  Managed by Neurology   Orders:  -     LEVETIRACETAM (KEPPRA); Future    2. Uncontrolled hypertension  -     METABOLIC PANEL, COMPREHENSIVE; Future  -   INCREASE  losartan-hydroCHLOROthiazide (HYZAAR) 100-25 mg per tablet; Take 1 Tablet by mouth daily. , Normal, Disp-90 Tablet, R-1  - Recommended purchasing BP machine or contacting insurance carrier to see if they will provide him with one for home BP monitoring  - RTC in 2 wks for BP recheck    3. Need for hepatitis C screening test  -     HEPATITIS C AB; Future      Return in about 2 weeks (around 2022). SUBJECTIVE/OBJECTIVE:    Jocy Cordova is a 43 y.o. male seen today for HTN and seizure disorder. Cardiovascular Review:  He has hypertension. Diet and Lifestyle: generally follows a low fat low cholesterol diet  Home BP Monitoring: is not measured at home. Pertinent ROS: taking medications as instructed, no medication side effects noted, no TIA's, no chest pain on exertion, no dyspnea on exertion, no swelling of ankles. Seizures:  Managed by Neurology. Last seen 22        REVIEW OF SYSTEMS:    Review of Systems   All other systems reviewed and are negative.       VITAL SIGNS:    Wt Readings from Last 3 Encounters:   22 218 lb 3.2 oz (99 kg)   22 235 lb (106.6 kg)   21 235 lb (106.6 kg)     Temp Readings from Last 3 Encounters:   22 97.6 °F (36.4 °C) (Temporal)   21 97.9 °F (36.6 °C) (Temporal)   10/14/21 97.5 °F (36.4 °C) (Temporal)     BP Readings from Last 3 Encounters:   22 (!) 150/96   22 (!) 142/90   21 135/89     Pulse Readings from Last 3 Encounters:   22 (!) 56   22 (!) 55   21 63           PHYSICAL EXAMINATION: General: Alert, cooperative, no distress  Respiratory: Breathing comfortably, in no acute respiratory distress. Clear to auscultation bilaterally. Cardiovascular: Regular rate and rhythm, S1, S2 normal, no murmur, click, rub or gallop. Extremities: no edema. Abdomen: Soft, non-tender, not distended. Bowel sounds normal. No masses or organomegaly. MSK: Extremities normal appearing, atraumatic, no effusion. Gait steady and unassisted. Skin: Skin color, texture, turgor normal. No rashes or lesions on exposed skin. Neurologic: A/Ox3  Psychiatric: Normal affect. Mood euthymic. Thoughts logical. Speech volume and speed normal            Treatment risks/benefits/costs/interactions/alternatives discussed with patient. Advised patient to call back or return to office if symptoms worsen/change/persist. If patient cannot reach us or should anything more severe/urgent arise he/she should proceed directly to the nearest emergency department. Discussed expected course/resolution/complications of diagnosis in detail with patient. Patient expressed understanding with the diagnosis and plan. An electronic signature was used to authenticate this note.   -- Ranelle Felty, NP

## 2022-12-07 LAB
ALBUMIN SERPL-MCNC: 4.7 G/DL (ref 4–5)
ALBUMIN/GLOB SERPL: 1.5 {RATIO} (ref 1.2–2.2)
ALP SERPL-CCNC: 95 IU/L (ref 44–121)
ALT SERPL-CCNC: 11 IU/L (ref 0–44)
AST SERPL-CCNC: 15 IU/L (ref 0–40)
BILIRUB SERPL-MCNC: 0.4 MG/DL (ref 0–1.2)
BUN SERPL-MCNC: 11 MG/DL (ref 6–20)
BUN/CREAT SERPL: 10 (ref 9–20)
CALCIUM SERPL-MCNC: 9.3 MG/DL (ref 8.7–10.2)
CHLORIDE SERPL-SCNC: 103 MMOL/L (ref 96–106)
CO2 SERPL-SCNC: 22 MMOL/L (ref 20–29)
CREAT SERPL-MCNC: 1.09 MG/DL (ref 0.76–1.27)
EGFR: 89 ML/MIN/1.73
GLOBULIN SER CALC-MCNC: 3.2 G/DL (ref 1.5–4.5)
GLUCOSE SERPL-MCNC: 89 MG/DL (ref 70–99)
HCV AB S/CO SERPL IA: <0.1 S/CO RATIO (ref 0–0.9)
LEVETIRACETAM SERPL-MCNC: 8.2 UG/ML (ref 10–40)
POTASSIUM SERPL-SCNC: 4.3 MMOL/L (ref 3.5–5.2)
PROT SERPL-MCNC: 7.9 G/DL (ref 6–8.5)
SODIUM SERPL-SCNC: 143 MMOL/L (ref 134–144)

## 2022-12-07 NOTE — PROGRESS NOTES
Please call patient to inform him his kidney, liver and electrolyte tests are normal.  Keppra level is low.   We will CC his neurologist.

## 2022-12-22 ENCOUNTER — VIRTUAL VISIT (OUTPATIENT)
Dept: FAMILY MEDICINE CLINIC | Age: 37
End: 2022-12-22
Payer: MEDICAID

## 2022-12-22 DIAGNOSIS — I10 ESSENTIAL HYPERTENSION: Primary | ICD-10-CM

## 2022-12-22 PROCEDURE — 99212 OFFICE O/P EST SF 10 MIN: CPT | Performed by: NURSE PRACTITIONER

## 2022-12-22 NOTE — PROGRESS NOTES
[facility] Note  Gonzalez Berkowitz is a 43 y.o. male who was seen by synchronous (real-time) audio-video technology on 12/22/2022 for Hypertension        Assessment & Plan:   Diagnoses and all orders for this visit:    1. Essential hypertension  -Losartan-hydrochlorothiazide 100 mg / 25 mg daily  -Continue blood pressure monitoring. Mr. Kalyan Campo states his insurance does not cover blood pressure cuffs and will continue to monitor his blood pressure at the local pharmacy. Follow-up and Dispositions    Return in about 5 months (around 5/22/2023). I spent at least 10 minutes on this visit with this established patient. Subjective:     Gonzalez Berkowitz is a 43 y.o. male seen today for hypertension follow up. Cardiovascular Review:  He has hypertension. Diet and Lifestyle: generally follows a low fat low cholesterol diet, exercises regularly  Home BP Monitoring: ranging 120's /80's. He has been checking at the local pharmacy on a regular basis. Pertinent ROS: taking medications as instructed, no medication side effects noted, no TIA's, no chest pain on exertion, no dyspnea on exertion, no swelling of ankles. Prior to Admission medications    Medication Sig Start Date End Date Taking? Authorizing Provider   losartan-hydroCHLOROthiazide (HYZAAR) 100-25 mg per tablet Take 1 Tablet by mouth daily. 12/5/22  Yes Chucho Hamm NP   levETIRAcetam 1,000 mg tablet TAKE 1 TABLET BY MOUTH TWICE A DAY  Indications: Complex partial seizures evolving to generalized tonic-clonic 11/28/22  Yes Talia Ray MD   potassium chloride (KLOR-CON M10) 10 mEq tablet Take 1 Tablet by mouth daily. Patient not taking: No sig reported 11/28/22   Talia Ray MD   famotidine (PEPCID) 20 mg tablet Take 1 Tab by mouth two (2) times a day.   Patient not taking: No sig reported 6/19/17   Jackie Printers, NP         Review of Systems   All other systems reviewed and are negative. Objective:     Patient-Reported Vitals 12/22/2022   Patient-Reported Systolic  980   Patient-Reported Diastolic 80        [INSTRUCTIONS:  \"[x]\" Indicates a positive item  \"[]\" Indicates a negative item  -- DELETE ALL ITEMS NOT EXAMINED]    Constitutional: [x] Appears well-developed and well-nourished [x] No apparent distress      [] Abnormal -     Mental status: [x] Alert and awake  [x] Oriented to person/place/time [x] Able to follow commands    [] Abnormal -     Eyes:   EOM    [x]  Normal    [] Abnormal -   Sclera  [x]  Normal    [] Abnormal -          Discharge [x]  None visible   [] Abnormal -     HENT: [x] Normocephalic, atraumatic  [] Abnormal -   [x] Mouth/Throat: Mucous membranes are moist    External Ears [x] Normal  [] Abnormal -    Neck: [x] No visualized mass [] Abnormal -     Pulmonary/Chest: [x] Respiratory effort normal   [x] No visualized signs of difficulty breathing or respiratory distress        [] Abnormal -      Musculoskeletal:   [x] Normal gait with no signs of ataxia         [x] Normal range of motion of neck        [] Abnormal -     Neurological:        [x] No Facial Asymmetry (Cranial nerve 7 motor function) (limited exam due to video visit)          [x] No gaze palsy        [] Abnormal -          Skin:        [x] No significant exanthematous lesions or discoloration noted on facial skin         [] Abnormal -            Psychiatric:       [x] Normal Affect [] Abnormal -        [x] No Hallucinations    Other pertinent observable physical exam findings:-        We discussed the expected course, resolution and complications of the diagnosis(es) in detail. Medication risks, benefits, costs, interactions, and alternatives were discussed as indicated. I advised him to contact the office if his condition worsens, changes or fails to improve as anticipated. He expressed understanding with the diagnosis(es) and plan.        Jocy Cordova, was evaluated through a synchronous (real-time) audio-video encounter. The patient (or guardian if applicable) is aware that this is a billable service, which includes applicable co-pays. This Virtual Visit was conducted with patient's (and/or legal guardian's) consent. The visit was conducted pursuant to the emergency declaration under the [address] waiver authority and the [employer] General Act. Patient identification was verified, and a caregiver was present when appropriate. The patient was located at: Home: [address] [place] [address] [address]   The provider was located at:  Facility (Appt Department): [facility]        Olivia Lerner NP

## 2022-12-30 DIAGNOSIS — I10 UNCONTROLLED HYPERTENSION: ICD-10-CM

## 2022-12-30 RX ORDER — LOSARTAN POTASSIUM AND HYDROCHLOROTHIAZIDE 25; 100 MG/1; MG/1
1 TABLET ORAL DAILY
Qty: 90 TABLET | Refills: 1 | Status: SHIPPED | OUTPATIENT
Start: 2022-12-30

## 2022-12-30 NOTE — TELEPHONE ENCOUNTER
Chief Complaint   Patient presents with    Medication Refill     losartan-hydroCHLOROthiazide (HYZAAR) 100-25 mg      Patient last office visit 12/22/2022

## 2023-05-21 RX ORDER — POTASSIUM CHLORIDE 750 MG/1
10 TABLET, EXTENDED RELEASE ORAL DAILY
COMMUNITY
Start: 2022-11-28

## 2023-05-21 RX ORDER — FAMOTIDINE 20 MG/1
20 TABLET, FILM COATED ORAL 2 TIMES DAILY
COMMUNITY
Start: 2017-06-19

## 2023-05-21 RX ORDER — LOSARTAN POTASSIUM AND HYDROCHLOROTHIAZIDE 25; 100 MG/1; MG/1
1 TABLET ORAL DAILY
COMMUNITY
Start: 2022-12-30

## 2023-05-21 RX ORDER — LEVETIRACETAM 1000 MG/1
TABLET ORAL
COMMUNITY
Start: 2022-11-28

## 2023-11-14 ENCOUNTER — TELEPHONE (OUTPATIENT)
Age: 38
End: 2023-11-14

## 2023-11-14 NOTE — TELEPHONE ENCOUNTER
----- Message from JARRET Martin NP sent at 11/14/2023  8:24 AM EST -----  Regarding: needs appt  Very over due for follow up. Please schedule appt

## 2024-04-19 ENCOUNTER — TELEPHONE (OUTPATIENT)
Age: 39
End: 2024-04-19

## 2024-04-19 RX ORDER — LEVETIRACETAM 1000 MG/1
TABLET ORAL
Qty: 60 TABLET | Refills: 4 | Status: SHIPPED | OUTPATIENT
Start: 2024-04-19

## 2024-04-19 NOTE — TELEPHONE ENCOUNTER
Patient is looking to see if he can get a new script for his seizure medication (Levetiracetam 1000mg).     Please advise.    Pt has a f/u appt on 07/29.

## 2024-04-19 NOTE — TELEPHONE ENCOUNTER
----- Message from Victorinasabine Lawton sent at 4/19/2024 11:23 AM EDT -----  Subject: Message to Provider    QUESTIONS  Information for Provider? Patient would like to switch his physician to an   MD from [facility]. He needs to because of certain medical things that   has to be signed by a physician only.  ---------------------------------------------------------------------------  --------------  CALL BACK INFO  [phone]; OK to leave message on voicemail  ---------------------------------------------------------------------------  --------------  SCRIPT ANSWERS  Relationship to Patient? Self

## 2024-04-22 NOTE — TELEPHONE ENCOUNTER
Tried to contact pt phone line busy. If pt calls back please let him know if he would like to change pcp from CELENA Lunsford to a MD he would need to schedule a new patient/establish care with new pcp appt with someone. Pt stated he needed a MD due to certain medical things only a MD can sign off on.-TM 4/22/24

## 2024-05-06 ENCOUNTER — OFFICE VISIT (OUTPATIENT)
Age: 39
End: 2024-05-06
Payer: MEDICAID

## 2024-05-06 VITALS
RESPIRATION RATE: 16 BRPM | SYSTOLIC BLOOD PRESSURE: 138 MMHG | WEIGHT: 205.4 LBS | TEMPERATURE: 97.9 F | HEART RATE: 47 BPM | DIASTOLIC BLOOD PRESSURE: 85 MMHG | BODY MASS INDEX: 30.42 KG/M2 | HEIGHT: 69 IN | OXYGEN SATURATION: 100 %

## 2024-05-06 DIAGNOSIS — G40.919 BREAKTHROUGH SEIZURE (HCC): ICD-10-CM

## 2024-05-06 DIAGNOSIS — Z09 ENCOUNTER FOR EXAMINATION FOLLOWING TREATMENT AT HOSPITAL: Primary | ICD-10-CM

## 2024-05-06 DIAGNOSIS — I10 ESSENTIAL HYPERTENSION: ICD-10-CM

## 2024-05-06 DIAGNOSIS — E87.6 HYPOKALEMIA: ICD-10-CM

## 2024-05-06 PROCEDURE — 99214 OFFICE O/P EST MOD 30 MIN: CPT | Performed by: NURSE PRACTITIONER

## 2024-05-06 RX ORDER — POTASSIUM CHLORIDE 750 MG/1
10 TABLET, EXTENDED RELEASE ORAL DAILY
Qty: 90 TABLET | Refills: 1 | Status: SHIPPED | OUTPATIENT
Start: 2024-05-06

## 2024-05-06 RX ORDER — LOSARTAN POTASSIUM AND HYDROCHLOROTHIAZIDE 25; 100 MG/1; MG/1
1 TABLET ORAL DAILY
Qty: 90 TABLET | Refills: 1 | Status: SHIPPED | OUTPATIENT
Start: 2024-05-06

## 2024-05-06 SDOH — ECONOMIC STABILITY: HOUSING INSECURITY
IN THE LAST 12 MONTHS, WAS THERE A TIME WHEN YOU DID NOT HAVE A STEADY PLACE TO SLEEP OR SLEPT IN A SHELTER (INCLUDING NOW)?: YES

## 2024-05-06 SDOH — ECONOMIC STABILITY: FOOD INSECURITY: WITHIN THE PAST 12 MONTHS, YOU WORRIED THAT YOUR FOOD WOULD RUN OUT BEFORE YOU GOT MONEY TO BUY MORE.: NEVER TRUE

## 2024-05-06 SDOH — ECONOMIC STABILITY: FOOD INSECURITY: WITHIN THE PAST 12 MONTHS, THE FOOD YOU BOUGHT JUST DIDN'T LAST AND YOU DIDN'T HAVE MONEY TO GET MORE.: NEVER TRUE

## 2024-05-06 SDOH — ECONOMIC STABILITY: INCOME INSECURITY: HOW HARD IS IT FOR YOU TO PAY FOR THE VERY BASICS LIKE FOOD, HOUSING, MEDICAL CARE, AND HEATING?: SOMEWHAT HARD

## 2024-05-06 ASSESSMENT — PATIENT HEALTH QUESTIONNAIRE - PHQ9
1. LITTLE INTEREST OR PLEASURE IN DOING THINGS: NOT AT ALL
SUM OF ALL RESPONSES TO PHQ QUESTIONS 1-9: 0
SUM OF ALL RESPONSES TO PHQ QUESTIONS 1-9: 0
SUM OF ALL RESPONSES TO PHQ9 QUESTIONS 1 & 2: 0
2. FEELING DOWN, DEPRESSED OR HOPELESS: NOT AT ALL
SUM OF ALL RESPONSES TO PHQ QUESTIONS 1-9: 0
SUM OF ALL RESPONSES TO PHQ QUESTIONS 1-9: 0

## 2024-05-06 NOTE — PROGRESS NOTES
[facility]  Clinic Note     Mariano Da Silva (: 1985) is a 38 y.o. male, established patient, here for evaluation of the following chief complaint(s):  Hypertension       ASSESSMENT/PLAN:  1. Encounter for examination following treatment at hospital  -Emergency room course reviewed.  Labs reviewed.  No imaging was completed for review.  \  2. Breakthrough seizure (HCC)  -Evaluated the emergency room yesterday May 5 for breakthrough seizure.  Mr. Arnold has been taking his Keppra as prescribed.  -Has an appointment to establish with Dr. Montes De Oca as his previous neurologist Dr. Albrecht is no longer practicing.    3. Essential hypertension  -Borderline blood pressure reading today.  -Metabolic panel completed yesterday 2024 in the emergency room.  , K3.5, creatinine 0.9  -     losartan-hydroCHLOROthiazide (HYZAAR) 100-25 MG per tablet; Take 1 tablet by mouth daily, Disp-90 tablet, R-1Normal  -     potassium chloride (KLOR-CON M) 10 MEQ extended release tablet; Take 1 tablet by mouth daily, Disp-90 tablet, R-1Normal    4. Hypokalemia  -     potassium chloride (KLOR-CON M) 10 MEQ extended release tablet; Take 1 tablet by mouth daily, Disp-90 tablet, R-1Normal  -Potassium level on 2024 = 3.5        Return in about 6 months (around 2024).        SUBJECTIVE/OBJECTIVE:    Mariano Da Silva is a 38 y.o. male seen today for emergency room follow-up & hypertension.     Mr. Da Silva was evaluated at [facility] emergency room department yesterday for breakthrough seizure.  After labs and monitoring patient was discharged home.  He has been seizure-free since.  Mr. Da Silva has been taking his medication as prescribed and is requesting a refill.  Has an upcoming appointment to establish care with new neurologist in July.        CURRENT MEDICATIONS:  Current Outpatient Medications   Medication Sig    losartan-hydroCHLOROthiazide (HYZAAR) 100-25 MG per tablet Take 1 tablet by mouth daily

## 2024-05-06 NOTE — PATIENT INSTRUCTIONS
[facility] Financial Resources*  (Call 211 if need more resources.)    Medical Care  [facility] Financial Assistance  What they offer: The [employer] Financial Assistance Program helps uninsured patients who do not qualify for government-sponsored health insurance and cannot afford to pay for their medical care. Insured patients may also qualify for assistance based on family income, family size, and medical needs.  Phone Number: [phone]  How to apply for the [employer] Financial Assistance Program:  Option 1: To apply for financial assistance, a patient (or their family or other provider) should fill out the Financial Assistance Application. Copies of the Financial Assistance Application and the FAP may be obtained for free by calling the [facility] customer service department at [phone].  Option 2: The Financial Assistance Application and policy may be obtained for free by downloading a copy from the [employer] website:  https://www.[employer]/patient-resources/financial-assistance  Applications are available in several languages on the website    [facility] Financial Assistance  What they offer: [facility] has a Financial Assistance Policy that provides free or discounted health care to qualified patients.  Website:  https://[employer]/patient-financial/pricing  Phone Number for Patient Benefit Advisors: [phone]    [facility] Financial Assistance  What they offer: Help with understanding a bill, finding out what insurance pays, applying for financial aid, or setting up a payment plan.  Website:  https://[employer]/services/billing-insurance/amnbthdgj-uzvroukofv-hvckqjpbevu  Financial Counseling Call Center: 604.199.3079    [facility]  What they offer:   Mobile healthcare resources for uninsured patients.  Contact: [phone]        [facility]  What they offer:  Healthcare screenings and vaccines.  Contact: [phone]        [employer] ([facility])  What they offer:  Mammograms

## 2024-05-06 NOTE — PROGRESS NOTES
Chief Complaint   Patient presents with    Hypertension       \"Have you been to the ER, urgent care clinic since your last visit?  Hospitalized since your last visit?\"    YES - When: approximately 1 days ago.  Where and Why: [facility] for seizure.    “Have you seen or consulted any other health care providers outside of [facility] since your last visit?”    NO      Click Here for Release of Records Request          5/6/2024    11:30 AM   PHQ-9    Little interest or pleasure in doing things 0   Feeling down, depressed, or hopeless 0   PHQ-2 Score 0   PHQ-9 Total Score 0       Health Maintenance Due   Topic Date Due    Hepatitis B vaccine (1 of 3 - 3-dose series) Never done    COVID-19 Vaccine (1) Never done    HIV screen  Never done    DTaP/Tdap/Td vaccine (2 - Td or Tdap) 01/10/2015    Varicella vaccine (1 of 2 - 2-dose childhood series) Never done    Depression Screen  12/05/2023

## 2024-07-29 ENCOUNTER — OFFICE VISIT (OUTPATIENT)
Age: 39
End: 2024-07-29
Payer: MEDICAID

## 2024-07-29 VITALS
BODY MASS INDEX: 27.55 KG/M2 | SYSTOLIC BLOOD PRESSURE: 164 MMHG | HEART RATE: 50 BPM | HEIGHT: 69 IN | WEIGHT: 186 LBS | RESPIRATION RATE: 16 BRPM | DIASTOLIC BLOOD PRESSURE: 100 MMHG | OXYGEN SATURATION: 100 %

## 2024-07-29 DIAGNOSIS — G40.209 LOCALIZATION-RELATED (FOCAL) (PARTIAL) SYMPTOMATIC EPILEPSY AND EPILEPTIC SYNDROMES WITH COMPLEX PARTIAL SEIZURES, NOT INTRACTABLE, WITHOUT STATUS EPILEPTICUS (HCC): Primary | ICD-10-CM

## 2024-07-29 PROCEDURE — 3080F DIAST BP >= 90 MM HG: CPT | Performed by: PSYCHIATRY & NEUROLOGY

## 2024-07-29 PROCEDURE — 3077F SYST BP >= 140 MM HG: CPT | Performed by: PSYCHIATRY & NEUROLOGY

## 2024-07-29 PROCEDURE — 99214 OFFICE O/P EST MOD 30 MIN: CPT | Performed by: PSYCHIATRY & NEUROLOGY

## 2024-07-29 RX ORDER — LEVETIRACETAM 1000 MG/1
TABLET ORAL
Qty: 180 TABLET | Refills: 3 | Status: SHIPPED | OUTPATIENT
Start: 2024-07-29

## 2024-07-29 NOTE — PROGRESS NOTES
takes his Keppra without food and waits about 30 minutes before he lays down or eats anything and this is manageable.  Has been doing this since he was with Dr. Albrecht.    Epilepsy history  He states that he started having seizures at the age of 1.  He was on phenobarbital for a while but was ineffective.  At some point he got changed to Keppra and has only she takes his medication he does well.    Current antiseizure medication  Keppra 1000 mg tablets--1 tablet twice daily    Previous antiseizure medication  Phenobarbital    Last seizure  May 5, 2024--patient ran out of medication    Seizure type  Described as generalized tonic-clonic    Diagnostics  EEG April 9, 2015 read out as normal  CT of the head from [facility] dated September 17, 2021 was normal    Most recent laboratory analysis   May 5, 2024  Keppra low at 3.9  CBC normal  Ethanol negative  Basic metabolic panel unremarkable    Record review finds an emergency department visit May 5, 2024 where he came in reporting generalized tonic-clonic seizure.  He endorsed compliance.  No injury.  No incontinence or lingual trauma.  His examination was unremarkable.  He was evaluated and released.    Office visit with nurse practitioner Jonn dated May 6, 2024 where his hospital visit was outlined.  He was treated for his hypertension and hypokalemia.    Examination  BP (!) 162/94 (Site: Right Upper Arm, Position: Sitting)   Pulse 50   Resp 16   Ht 1.753 m (5' 9\")   Wt 84.4 kg (186 lb)   SpO2 100%   BMI 27.47 kg/m²   Awake, alert and oriented.  No icterus.  CN intact 2-12 without nystagmus.  No pronation or drift. Resists fully in all 4 extrems.  DTR symmetric in all 4 extremities.  No ataxia. Steady gait.      Impression/Plan  Epilepsy unknown syndrome classification  and at this point as long as he takes his medication by history he is well but he did have worsening of his condition breakthrough seizure due to running out of medication    We did discuss

## 2025-05-08 DIAGNOSIS — I10 ESSENTIAL HYPERTENSION: ICD-10-CM

## 2025-05-08 NOTE — TELEPHONE ENCOUNTER
PCP: [facility], APRN - NP    Last appt: 5/6/2024     Future Appointments   Date Time Provider Department Center   7/28/2025  2:45 PM Senait Montes De Oca MD [id number]       Requested Prescriptions     Pending Prescriptions Disp Refills    losartan-hydroCHLOROthiazide (HYZAAR) 100-25 MG per tablet [Pharmacy Med Name: LOSARTAN-HCTZ 100-25 MG TAB] 30 tablet 5     Sig: TAKE 1 TABLET BY MOUTH EVERY DAY       Prior labs and Blood pressures:  BP Readings from Last 3 Encounters:   07/29/24 (!) 164/100   05/06/24 138/85   12/05/22 (!) 150/96     Lab Results   Component Value Date/Time     12/05/2022 12:00 AM    K 4.3 12/05/2022 12:00 AM     12/05/2022 12:00 AM    CO2 22 12/05/2022 12:00 AM    BUN 11 12/05/2022 12:00 AM    GFRAA >60 11/17/2020 04:39 PM     No results found for: \"HBA1C\", \"BLX4FEKA\"  Lab Results   Component Value Date/Time    CHOL 134 11/17/2020 04:39 PM    HDL 36 11/17/2020 04:39 PM    LDL 75.2 11/17/2020 04:39 PM    VLDL 22.8 11/17/2020 04:39 PM     No results found for: \"VITD3\"    No results found for: \"TSH\", \"TSH2\", \"TSH3\"

## 2025-05-09 RX ORDER — LOSARTAN POTASSIUM AND HYDROCHLOROTHIAZIDE 25; 100 MG/1; MG/1
1 TABLET ORAL DAILY
Qty: 30 TABLET | Refills: 0 | Status: SHIPPED | OUTPATIENT
Start: 2025-05-09

## 2025-06-17 ENCOUNTER — OFFICE VISIT (OUTPATIENT)
Age: 40
End: 2025-06-17
Payer: MEDICAID

## 2025-06-17 VITALS
TEMPERATURE: 97.8 F | SYSTOLIC BLOOD PRESSURE: 124 MMHG | WEIGHT: 195.2 LBS | DIASTOLIC BLOOD PRESSURE: 78 MMHG | RESPIRATION RATE: 16 BRPM | HEIGHT: 69 IN | HEART RATE: 56 BPM | BODY MASS INDEX: 28.91 KG/M2 | OXYGEN SATURATION: 100 %

## 2025-06-17 DIAGNOSIS — Z02.89 ENCOUNTER FOR COMPLETION OF FORM WITH PATIENT: ICD-10-CM

## 2025-06-17 DIAGNOSIS — K21.9 GASTROESOPHAGEAL REFLUX DISEASE WITHOUT ESOPHAGITIS: ICD-10-CM

## 2025-06-17 DIAGNOSIS — E87.6 HYPOKALEMIA: ICD-10-CM

## 2025-06-17 DIAGNOSIS — G40.909 SEIZURE DISORDER (HCC): Primary | ICD-10-CM

## 2025-06-17 DIAGNOSIS — Z78.9: ICD-10-CM

## 2025-06-17 DIAGNOSIS — L20.84 INTRINSIC ECZEMA: ICD-10-CM

## 2025-06-17 DIAGNOSIS — I10 ESSENTIAL HYPERTENSION: ICD-10-CM

## 2025-06-17 PROBLEM — E66.01 SEVERE OBESITY (BMI 35.0-39.9) WITH COMORBIDITY (HCC): Status: RESOLVED | Noted: 2018-04-11 | Resolved: 2025-06-17

## 2025-06-17 PROCEDURE — 99214 OFFICE O/P EST MOD 30 MIN: CPT

## 2025-06-17 RX ORDER — LOSARTAN POTASSIUM AND HYDROCHLOROTHIAZIDE 25; 100 MG/1; MG/1
1 TABLET ORAL DAILY
Qty: 90 TABLET | Refills: 2 | Status: SHIPPED | OUTPATIENT
Start: 2025-06-17

## 2025-06-17 SDOH — ECONOMIC STABILITY: FOOD INSECURITY: WITHIN THE PAST 12 MONTHS, THE FOOD YOU BOUGHT JUST DIDN'T LAST AND YOU DIDN'T HAVE MONEY TO GET MORE.: NEVER TRUE

## 2025-06-17 SDOH — ECONOMIC STABILITY: TRANSPORTATION INSECURITY: IN THE PAST 12 MONTHS, HAS LACK OF TRANSPORTATION KEPT YOU FROM MEDICAL APPOINTMENTS OR FROM GETTING MEDICATIONS?: YES

## 2025-06-17 SDOH — ECONOMIC STABILITY: FOOD INSECURITY: WITHIN THE PAST 12 MONTHS, YOU WORRIED THAT YOUR FOOD WOULD RUN OUT BEFORE YOU GOT MONEY TO BUY MORE.: NEVER TRUE

## 2025-06-17 ASSESSMENT — PATIENT HEALTH QUESTIONNAIRE - PHQ9
SUM OF ALL RESPONSES TO PHQ QUESTIONS 1-9: 0
1. LITTLE INTEREST OR PLEASURE IN DOING THINGS: NOT AT ALL
SUM OF ALL RESPONSES TO PHQ QUESTIONS 1-9: 0
SUM OF ALL RESPONSES TO PHQ QUESTIONS 1-9: 0
2. FEELING DOWN, DEPRESSED OR HOPELESS: NOT AT ALL
SUM OF ALL RESPONSES TO PHQ QUESTIONS 1-9: 0

## 2025-06-17 NOTE — PROGRESS NOTES
[facility]  [address] [place] [address]  Office [phone], Fax [phone]    Assessment/Plan:   Assessment:   39-year-old male with a history of epilepsy, hypertension presents today to follow-up for chronic conditions, as well as form completion.  Assessment & Plan  1. Hypertension: Stable.   - Stable, continue Hyzaar  - Prescription refill for Hyzaar 100-25 mg will be sent to the pharmacy.  - Discussed medication adherence and effectiveness.    2. Epilepsy: Chronic.  Controlled  - Seizures occur primarily when stressed, approximately every 6 to 8 months.  - Currently taking Keppra 1000 mg twice daily, causing drowsiness and increased appetite.  - Reviewed recent Keppra levels and discussed medication adherence.  - Upcoming appointment with neurologist on 07/28/2025.  - Completed paperwork for discounted fare ID given inability to drive due to epilepsy for bus     3. Acid Reflux Disease.  Chronic, stable.  - Reports significant improvement in symptoms.  - No longer requires medication for acid reflux.  - Discussed dietary and lifestyle modifications that have helped.    4. Hypokalemia.  Chronic, likely secondary to Hyzaar.  - Potassium levels were previously low but were normal in February 2025.  - Reviewed recent lab results and confirmed no current supplementation needed.  - Discussed the importance of monitoring potassium levels periodically.    5. Eczema. Chronic  - Manages eczema with Dove bar soap and vitamin E cream lotion.  - Reports improvement with current regimen.  - Discussed the option of seeing a dermatologist if symptoms worsen.    6. Health Maintenance.  - Due for a tetanus vaccine, last received in 2005.  - Prefers to defer the tetanus vaccine at present.  - Discussed the importance of staying up to date with vaccinations.    Follow-up  - Follow up in 4 to 5 months for repeat blood work and blood pressure monitoring.       1. Seizure disorder

## 2025-06-17 NOTE — PROGRESS NOTES
Chief Complaint   Patient presents with    New Patient     Establishing care , need pcp to fill out forms       \"Have you been to the ER, urgent care clinic since your last visit?  Hospitalized since your last visit?\"    YES - When: approximately 4 months ago.  Where and Why: [facility] due to pneumonia and seizures.    “Have you seen or consulted any other health care providers outside of [facility] since your last visit?”    NO                   6/17/2025     1:30 PM   PHQ-9    Little interest or pleasure in doing things 0   Feeling down, depressed, or hopeless 0   PHQ-2 Score 0   PHQ-9 Total Score 0           Financial Resource Strain: Medium Risk (5/6/2024)    Overall Financial Resource Strain (CARDIA)     Difficulty of Paying Living Expenses: Somewhat hard      Food Insecurity: No Food Insecurity (6/17/2025)    Hunger Vital Sign     Worried About Running Out of Food in the Last Year: Never true     Ran Out of Food in the Last Year: Never true          Health Maintenance Due   Topic Date Due    HIV screen  Never done    Hepatitis B vaccine (1 of 3 - 19+ 3-dose series) Never done    DTaP/Tdap/Td vaccine (2 - Td or Tdap) 01/10/2015    Varicella vaccine (1 of 2 - 13+ 2-dose series) Never done    COVID-19 Vaccine (1 - 2024-25 season) Never done    Depression Screen  05/06/2025

## 2025-07-28 ENCOUNTER — OFFICE VISIT (OUTPATIENT)
Age: 40
End: 2025-07-28
Payer: MEDICAID

## 2025-07-28 VITALS
BODY MASS INDEX: 28.73 KG/M2 | OXYGEN SATURATION: 100 % | WEIGHT: 194 LBS | DIASTOLIC BLOOD PRESSURE: 92 MMHG | SYSTOLIC BLOOD PRESSURE: 139 MMHG | TEMPERATURE: 96.9 F | RESPIRATION RATE: 18 BRPM | HEART RATE: 61 BPM | HEIGHT: 69 IN

## 2025-07-28 DIAGNOSIS — G40.209 LOCALIZATION-RELATED (FOCAL) (PARTIAL) SYMPTOMATIC EPILEPSY AND EPILEPTIC SYNDROMES WITH COMPLEX PARTIAL SEIZURES, NOT INTRACTABLE, WITHOUT STATUS EPILEPTICUS (HCC): Primary | ICD-10-CM

## 2025-07-28 PROCEDURE — 3080F DIAST BP >= 90 MM HG: CPT | Performed by: PSYCHIATRY & NEUROLOGY

## 2025-07-28 PROCEDURE — 3075F SYST BP GE 130 - 139MM HG: CPT | Performed by: PSYCHIATRY & NEUROLOGY

## 2025-07-28 PROCEDURE — 99214 OFFICE O/P EST MOD 30 MIN: CPT | Performed by: PSYCHIATRY & NEUROLOGY

## 2025-07-28 RX ORDER — LEVETIRACETAM 1000 MG/1
TABLET ORAL
Qty: 180 TABLET | Refills: 3 | Status: SHIPPED | OUTPATIENT
Start: 2025-07-28

## 2025-07-28 NOTE — PROGRESS NOTES
Room:  I have reviewed all needed documentation in preparation for visit. Verified patient by name and date of birth  Chief Complaint   Patient presents with    Seizures     Annual-had seizure 02/08, 02/09 and 02/10, PCP stated that keppra levels low         Vitals:    07/28/25 1444   BP: (!) 139/92   Pulse: 61   Resp: 18   Temp: 96.9 °F (36.1 °C)   TempSrc: Temporal   SpO2: 100%   Weight: 88 kg (194 lb)   Height: 1.753 m (5' 9.02\")        Health Maintenance Due   Topic Date Due    HIV screen  Never done    Hepatitis B vaccine (1 of 3 - 19+ 3-dose series) Never done    Varicella vaccine (1 of 2 - 13+ 2-dose series) Never done    COVID-19 Vaccine (1 - 2024-25 season) Never done        1. \"Have you been to the ER, urgent care clinic since your last visit?  Hospitalized since your last visit?\" Yes 07/2025 for dehydration [facility]    2. \"Have you seen or consulted any other health care providers outside of the [facility] since your last visit?\" No

## 2025-07-28 NOTE — PROGRESS NOTES
[facility] at [facility] at [address] [place] [address] [address] [facility] [address] [place] [address] [phone]              Chief Complaint   Patient presents with    Seizures     Annual-had seizure 02/08, 02/09 and 02/10, PCP stated that keppra levels low       Current Outpatient Medications   Medication Sig Dispense Refill    losartan-hydroCHLOROthiazide (HYZAAR) 100-25 MG per tablet Take 1 tablet by mouth daily 90 tablet 2    levETIRAcetam (KEPPRA) 1000 MG tablet TAKE 1 TABLET BY MOUTH TWICE A DAY  Indications: Complex partial seizures evolving to generalized tonic-clonic 180 tablet 3     No current facility-administered medications for this visit.      Allergies   Allergen Reactions    Amlodipine Swelling     Social History     Tobacco Use    Smoking status: Never    Smokeless tobacco: Never   Vaping Use    Vaping status: Never Used   Substance Use Topics    Alcohol use: No    Drug use: No     History of Present Illness  39-year-old gentleman following up today for epilepsy.  He reports 3 seizures in February.  He sought emergency department care.  He had run out of medicine for about 2 days or so.  He notes that he has been compliant with his medication since then.    Epilepsy history  He states that he started having seizures at the age of 1.  He was on phenobarbital for a while but was ineffective.  At some point he got changed to Keppra and has only he takes his medication he does well.     Current antiseizure medication  Keppra 1000 mg tablets--1 tablet twice daily     Previous antiseizure medication  Phenobarbital     Last seizure  February 2025--ran out of medication  May 5, 2024--patient ran out of medication     Seizure type  Described as generalized tonic-clonic     Diagnostics  EEG April 9, 2015 read out as normal  CT of the head from [place] dated September 17,